# Patient Record
Sex: MALE | Race: WHITE | Employment: UNEMPLOYED | URBAN - METROPOLITAN AREA
[De-identification: names, ages, dates, MRNs, and addresses within clinical notes are randomized per-mention and may not be internally consistent; named-entity substitution may affect disease eponyms.]

---

## 2017-01-23 ENCOUNTER — HOSPITAL ENCOUNTER (EMERGENCY)
Age: 62
Discharge: HOME | End: 2017-01-23
Attending: EMERGENCY MEDICINE
Payer: SELF-PAY

## 2017-01-23 VITALS
TEMPERATURE: 98.3 F | HEIGHT: 71 IN | HEART RATE: 97 BPM | OXYGEN SATURATION: 92 % | BODY MASS INDEX: 25.2 KG/M2 | SYSTOLIC BLOOD PRESSURE: 139 MMHG | RESPIRATION RATE: 17 BRPM | DIASTOLIC BLOOD PRESSURE: 85 MMHG | WEIGHT: 180 LBS

## 2017-01-23 DIAGNOSIS — F10.29 ALCOHOL DEPENDENCE WITH UNSPECIFIED ALCOHOL-INDUCED DISORDER (HCC): Primary | ICD-10-CM

## 2017-01-23 DIAGNOSIS — R52 BODY ACHES: ICD-10-CM

## 2017-01-23 LAB
ALBUMIN SERPL BCP-MCNC: 3.7 G/DL (ref 3.5–5)
ALBUMIN/GLOB SERPL: 1.1 {RATIO} (ref 1.1–2.2)
ALP SERPL-CCNC: 94 U/L (ref 45–117)
ALT SERPL-CCNC: 54 U/L (ref 12–78)
ANION GAP BLD CALC-SCNC: 13 MMOL/L (ref 5–15)
APPEARANCE UR: CLEAR
AST SERPL W P-5'-P-CCNC: 71 U/L (ref 15–37)
BACTERIA URNS QL MICRO: NEGATIVE /HPF
BASOPHILS # BLD AUTO: 0 K/UL (ref 0–0.1)
BASOPHILS # BLD: 0 % (ref 0–1)
BILIRUB SERPL-MCNC: 0.4 MG/DL (ref 0.2–1)
BILIRUB UR QL: NEGATIVE
BUN SERPL-MCNC: 6 MG/DL (ref 6–20)
BUN/CREAT SERPL: 7 (ref 12–20)
CALCIUM SERPL-MCNC: 8.2 MG/DL (ref 8.5–10.1)
CHLORIDE SERPL-SCNC: 104 MMOL/L (ref 97–108)
CO2 SERPL-SCNC: 22 MMOL/L (ref 21–32)
COLOR UR: ABNORMAL
CREAT SERPL-MCNC: 0.86 MG/DL (ref 0.7–1.3)
DIFFERENTIAL METHOD BLD: ABNORMAL
EOSINOPHIL # BLD: 0 K/UL (ref 0–0.4)
EOSINOPHIL NFR BLD: 0 % (ref 0–7)
EPITH CASTS URNS QL MICRO: ABNORMAL /LPF
ERYTHROCYTE [DISTWIDTH] IN BLOOD BY AUTOMATED COUNT: 13.8 % (ref 11.5–14.5)
GLOBULIN SER CALC-MCNC: 3.5 G/DL (ref 2–4)
GLUCOSE SERPL-MCNC: 113 MG/DL (ref 65–100)
GLUCOSE UR STRIP.AUTO-MCNC: 100 MG/DL
HCT VFR BLD AUTO: 42.2 % (ref 36.6–50.3)
HGB BLD-MCNC: 15.2 G/DL (ref 12.1–17)
HGB UR QL STRIP: NEGATIVE
HYALINE CASTS URNS QL MICRO: ABNORMAL /LPF (ref 0–5)
INR PPP: 1 (ref 0.9–1.1)
KETONES UR QL STRIP.AUTO: NEGATIVE MG/DL
LEUKOCYTE ESTERASE UR QL STRIP.AUTO: NEGATIVE
LIPASE SERPL-CCNC: 98 U/L (ref 73–393)
LYMPHOCYTES # BLD AUTO: 7 % (ref 12–49)
LYMPHOCYTES # BLD: 0.7 K/UL (ref 0.8–3.5)
MAGNESIUM SERPL-MCNC: 1.9 MG/DL (ref 1.6–2.4)
MCH RBC QN AUTO: 34 PG (ref 26–34)
MCHC RBC AUTO-ENTMCNC: 36 G/DL (ref 30–36.5)
MCV RBC AUTO: 94.4 FL (ref 80–99)
MONOCYTES # BLD: 0.7 K/UL (ref 0–1)
MONOCYTES NFR BLD AUTO: 7 % (ref 5–13)
NEUTS SEG # BLD: 7.9 K/UL (ref 1.8–8)
NEUTS SEG NFR BLD AUTO: 86 % (ref 32–75)
NITRITE UR QL STRIP.AUTO: NEGATIVE
PH UR STRIP: 5.5 [PH] (ref 5–8)
PLATELET # BLD AUTO: 132 K/UL (ref 150–400)
POTASSIUM SERPL-SCNC: 3.6 MMOL/L (ref 3.5–5.1)
PROT SERPL-MCNC: 7.2 G/DL (ref 6.4–8.2)
PROT UR STRIP-MCNC: NEGATIVE MG/DL
PROTHROMBIN TIME: 10.5 SEC (ref 9–11.1)
RBC # BLD AUTO: 4.47 M/UL (ref 4.1–5.7)
RBC #/AREA URNS HPF: ABNORMAL /HPF (ref 0–5)
RBC MORPH BLD: ABNORMAL
SODIUM SERPL-SCNC: 139 MMOL/L (ref 136–145)
SP GR UR REFRACTOMETRY: 1.01 (ref 1–1.03)
UA: UC IF INDICATED,UAUC: ABNORMAL
UROBILINOGEN UR QL STRIP.AUTO: 0.2 EU/DL (ref 0.2–1)
WBC # BLD AUTO: 9.3 K/UL (ref 4.1–11.1)
WBC URNS QL MICRO: ABNORMAL /HPF (ref 0–4)

## 2017-01-23 PROCEDURE — 96376 TX/PRO/DX INJ SAME DRUG ADON: CPT

## 2017-01-23 PROCEDURE — 85025 COMPLETE CBC W/AUTO DIFF WBC: CPT | Performed by: EMERGENCY MEDICINE

## 2017-01-23 PROCEDURE — 36415 COLL VENOUS BLD VENIPUNCTURE: CPT | Performed by: EMERGENCY MEDICINE

## 2017-01-23 PROCEDURE — 74011000258 HC RX REV CODE- 258: Performed by: EMERGENCY MEDICINE

## 2017-01-23 PROCEDURE — 83690 ASSAY OF LIPASE: CPT | Performed by: EMERGENCY MEDICINE

## 2017-01-23 PROCEDURE — 96375 TX/PRO/DX INJ NEW DRUG ADDON: CPT

## 2017-01-23 PROCEDURE — 96365 THER/PROPH/DIAG IV INF INIT: CPT

## 2017-01-23 PROCEDURE — 74011250637 HC RX REV CODE- 250/637: Performed by: EMERGENCY MEDICINE

## 2017-01-23 PROCEDURE — 96361 HYDRATE IV INFUSION ADD-ON: CPT

## 2017-01-23 PROCEDURE — 81001 URINALYSIS AUTO W/SCOPE: CPT | Performed by: EMERGENCY MEDICINE

## 2017-01-23 PROCEDURE — 83735 ASSAY OF MAGNESIUM: CPT | Performed by: EMERGENCY MEDICINE

## 2017-01-23 PROCEDURE — 85610 PROTHROMBIN TIME: CPT | Performed by: EMERGENCY MEDICINE

## 2017-01-23 PROCEDURE — 80053 COMPREHEN METABOLIC PANEL: CPT | Performed by: EMERGENCY MEDICINE

## 2017-01-23 PROCEDURE — 74011250636 HC RX REV CODE- 250/636: Performed by: EMERGENCY MEDICINE

## 2017-01-23 PROCEDURE — 99285 EMERGENCY DEPT VISIT HI MDM: CPT

## 2017-01-23 RX ORDER — LORAZEPAM 2 MG/ML
1 INJECTION INTRAMUSCULAR ONCE
Status: COMPLETED | OUTPATIENT
Start: 2017-01-23 | End: 2017-01-23

## 2017-01-23 RX ORDER — ONDANSETRON 2 MG/ML
4 INJECTION INTRAMUSCULAR; INTRAVENOUS
Status: COMPLETED | OUTPATIENT
Start: 2017-01-23 | End: 2017-01-23

## 2017-01-23 RX ORDER — LORAZEPAM 2 MG/ML
1 INJECTION INTRAMUSCULAR
Status: COMPLETED | OUTPATIENT
Start: 2017-01-23 | End: 2017-01-23

## 2017-01-23 RX ORDER — CHLORDIAZEPOXIDE HYDROCHLORIDE 25 MG/1
25 CAPSULE, GELATIN COATED ORAL CONTINUOUS
Qty: 15 CAP | Refills: 0 | Status: SHIPPED | OUTPATIENT
Start: 2017-01-23 | End: 2018-11-26

## 2017-01-23 RX ORDER — THIAMINE HYDROCHLORIDE 100 MG/ML
100 INJECTION, SOLUTION INTRAMUSCULAR; INTRAVENOUS
Status: DISCONTINUED | OUTPATIENT
Start: 2017-01-23 | End: 2017-01-23 | Stop reason: SDUPTHER

## 2017-01-23 RX ORDER — OXYCODONE HYDROCHLORIDE 5 MG/1
5 TABLET ORAL
Status: COMPLETED | OUTPATIENT
Start: 2017-01-23 | End: 2017-01-23

## 2017-01-23 RX ADMIN — OXYCODONE HYDROCHLORIDE 5 MG: 5 TABLET ORAL at 16:56

## 2017-01-23 RX ADMIN — THIAMINE HYDROCHLORIDE 100 MG: 100 INJECTION, SOLUTION INTRAMUSCULAR; INTRAVENOUS at 18:23

## 2017-01-23 RX ADMIN — SODIUM CHLORIDE 1000 ML: 900 INJECTION, SOLUTION INTRAVENOUS at 16:56

## 2017-01-23 RX ADMIN — LORAZEPAM 1 MG: 2 INJECTION INTRAMUSCULAR; INTRAVENOUS at 20:14

## 2017-01-23 RX ADMIN — ONDANSETRON 4 MG: 2 INJECTION INTRAMUSCULAR; INTRAVENOUS at 16:56

## 2017-01-23 RX ADMIN — LORAZEPAM 1 MG: 2 INJECTION INTRAMUSCULAR; INTRAVENOUS at 18:21

## 2017-01-23 NOTE — ED PROVIDER NOTES
HPI Comments: Genevieve Roman is a 59 y.o. male who presents via EMS to the ED c/o intermittent, generalized abdominal cramps for the past 2 months. Pain does not radiate and comes and goes usually over several minutes. He denies any relieving or exacerbating factors. He also notes pain in bilateral shoulders. Per pt, he had 1 bottle of wine today, with his last drink at ~0900 this morning. He reports he does not eat frequently, but drinks at least 2 bottles of wine daily. He has never sought medical advice for this before. He denies any hx of previous endoscopy/colonscopy. He specifically denies any vomiting, hematochezia, melena, fevers, chills or chest pain. PCP: None    Social hx: +etoh abuse (2 bottles of wine per day for the past 40+ years)    There are no other complaints, changes, or physical findings at this time. The history is provided by the patient. History reviewed. No pertinent past medical history. History reviewed. No pertinent past surgical history. History reviewed. No pertinent family history. Social History     Social History    Marital status: SINGLE     Spouse name: N/A    Number of children: N/A    Years of education: N/A     Occupational History    Not on file. Social History Main Topics    Smoking status: Current Every Day Smoker     Packs/day: 2.00    Smokeless tobacco: Not on file    Alcohol use Yes      Comment: 2 bottles of wine x 40 years    Drug use: No    Sexual activity: Not on file     Other Topics Concern    Not on file     Social History Narrative         ALLERGIES: Review of patient's allergies indicates no known allergies. Review of Systems   Constitutional: Negative for chills, diaphoresis, fever and unexpected weight change. HENT: Negative for rhinorrhea and sore throat. Eyes: Negative for pain. Respiratory: Negative for shortness of breath, wheezing and stridor. Cardiovascular: Negative for chest pain and leg swelling. Gastrointestinal: Positive for abdominal pain (generalized). Negative for blood in stool, diarrhea, nausea and vomiting. Genitourinary: Negative for difficulty urinating, dysuria and flank pain. Musculoskeletal: Positive for arthralgias (bilateral shoulder pain). Negative for back pain and neck stiffness. Skin: Negative for rash. Neurological: Negative for seizures, syncope, weakness, light-headedness and headaches. Psychiatric/Behavioral: Negative for confusion. Patient Vitals for the past 12 hrs:   Temp Pulse Resp BP SpO2   01/23/17 1900 - (!) 101 18 121/87 92 %   01/23/17 1845 - 100 17 135/89 93 %   01/23/17 1830 - (!) 110 24 (!) 131/96 94 %   01/23/17 1815 - 97 19 128/84 93 %   01/23/17 1745 - 97 19 129/89 92 %   01/23/17 1715 - 99 22 110/90 93 %   01/23/17 1615 98.3 °F (36.8 °C) (!) 114 18 (!) 132/96 93 %     Physical Exam   Constitutional: He is oriented to person, place, and time. He appears well-developed and well-nourished. No distress. Speech is mildly slurred   HENT:   Nose: Nose normal.   Mouth/Throat: Oropharynx is clear and moist. No oropharyngeal exudate. Edentulous   Eyes: Conjunctivae and EOM are normal. Pupils are equal, round, and reactive to light. Right eye exhibits no discharge. Left eye exhibits no discharge. No scleral icterus. Neck: Normal range of motion. Neck supple. No JVD present. Cardiovascular: Normal rate, regular rhythm, normal heart sounds and intact distal pulses. No murmur heard. Pulmonary/Chest: Effort normal and breath sounds normal. No stridor. No respiratory distress. He has no wheezes. He has no rales. Abdominal: Soft. Bowel sounds are normal. He exhibits no distension. There is no rebound and no guarding. Diffuse abdominal tenderness   Musculoskeletal: He exhibits no edema or tenderness. Neurological: He is alert and oriented to person, place, and time. Skin: Skin is warm and dry. He is not diaphoretic.    Psychiatric: He has a normal mood and affect. Nursing note and vitals reviewed. MDM  Number of Diagnoses or Management Options  Alcohol dependence with unspecified alcohol-induced disorder ([facility] [address]): Body aches:   Diagnosis management comments: Alcohol dependence with withdrawal sx's. Hemodynamically stable. Pt also c/o spasming pain throughout his torso. Labs unremarkable, exam benign. No indication for inpatient detox. Pt treated with thiamine and benzos. Given Librium taper for detox. Resources given for withdrawal sx's. Stable for discharge. Amount and/or Complexity of Data Reviewed  Clinical lab tests: ordered and reviewed  Review and summarize past medical records: yes    Patient Progress  Patient progress: stable    ED Course       Procedures     Progress Note:  Pt having some tremors, likely due to etoh withdrawal. Will tx with ativan. LABORATORY TESTS:  Recent Results (from the past 12 hour(s))   CBC WITH AUTOMATED DIFF    Collection Time: 01/23/17  4:45 PM   Result Value Ref Range    WBC 9.3 4.1 - 11.1 K/uL    RBC 4.47 4.10 - 5.70 M/uL    HGB 15.2 12.1 - 17.0 g/dL    HCT 42.2 36.6 - 50.3 %    MCV 94.4 80.0 - 99.0 FL    MCH 34.0 26.0 - 34.0 PG    MCHC 36.0 30.0 - 36.5 g/dL    RDW 13.8 11.5 - 14.5 %    PLATELET 959 (L) 041 - 400 K/uL    NEUTROPHILS 86 (H) 32 - 75 %    LYMPHOCYTES 7 (L) 12 - 49 %    MONOCYTES 7 5 - 13 %    EOSINOPHILS 0 0 - 7 %    BASOPHILS 0 0 - 1 %    ABS. NEUTROPHILS 7.9 1.8 - 8.0 K/UL    ABS. LYMPHOCYTES 0.7 (L) 0.8 - 3.5 K/UL    ABS. MONOCYTES 0.7 0.0 - 1.0 K/UL    ABS. EOSINOPHILS 0.0 0.0 - 0.4 K/UL    ABS.  BASOPHILS 0.0 0.0 - 0.1 K/UL    RBC COMMENTS NORMOCYTIC, NORMOCHROMIC      DF SMEAR SCANNED     METABOLIC PANEL, COMPREHENSIVE    Collection Time: 01/23/17  4:45 PM   Result Value Ref Range    Sodium 139 136 - 145 mmol/L    Potassium 3.6 3.5 - 5.1 mmol/L    Chloride 104 97 - 108 mmol/L    CO2 22 21 - 32 mmol/L    Anion gap 13 5 - 15 mmol/L    Glucose 113 (H) 65 - 100 mg/dL    BUN 6 6 - 20 MG/DL Creatinine 0.86 0.70 - 1.30 MG/DL    BUN/Creatinine ratio 7 (L) 12 - 20      GFR est AA >60 >60 ml/min/1.73m2    GFR est non-AA >60 >60 ml/min/1.73m2    Calcium 8.2 (L) 8.5 - 10.1 MG/DL    Bilirubin, total 0.4 0.2 - 1.0 MG/DL    ALT 54 12 - 78 U/L    AST 71 (H) 15 - 37 U/L    Alk.  phosphatase 94 45 - 117 U/L    Protein, total 7.2 6.4 - 8.2 g/dL    Albumin 3.7 3.5 - 5.0 g/dL    Globulin 3.5 2.0 - 4.0 g/dL    A-G Ratio 1.1 1.1 - 2.2     LIPASE    Collection Time: 01/23/17  4:45 PM   Result Value Ref Range    Lipase 98 73 - 393 U/L   MAGNESIUM    Collection Time: 01/23/17  4:45 PM   Result Value Ref Range    Magnesium 1.9 1.6 - 2.4 mg/dL   PROTHROMBIN TIME + INR    Collection Time: 01/23/17  4:45 PM   Result Value Ref Range    INR 1.0 0.9 - 1.1      Prothrombin time 10.5 9.0 - 11.1 sec   URINALYSIS W/ REFLEX CULTURE    Collection Time: 01/23/17  6:00 PM   Result Value Ref Range    Color YELLOW/STRAW      Appearance CLEAR CLEAR      Specific gravity 1.009 1.003 - 1.030      pH (UA) 5.5 5.0 - 8.0      Protein NEGATIVE  NEG mg/dL    Glucose 100 (A) NEG mg/dL    Ketone NEGATIVE  NEG mg/dL    Bilirubin NEGATIVE  NEG      Blood NEGATIVE  NEG      Urobilinogen 0.2 0.2 - 1.0 EU/dL    Nitrites NEGATIVE  NEG      Leukocyte Esterase NEGATIVE  NEG      WBC 0-4 0 - 4 /hpf    RBC 0-5 0 - 5 /hpf    Epithelial cells FEW FEW /lpf    Bacteria NEGATIVE  NEG /hpf    UA:UC IF INDICATED CULTURE NOT INDICATED BY UA RESULT CNI      Hyaline Cast 2-5 0 - 5 /lpf       IMAGING RESULTS:  No orders to display     MEDICATIONS GIVEN:  Medications   sodium chloride 0.9 % bolus infusion 1,000 mL (0 mL IntraVENous IV Completed 1/23/17 1819)   ondansetron (ZOFRAN) injection 4 mg (4 mg IntraVENous Given 1/23/17 5206)   oxyCODONE IR (ROXICODONE) tablet 5 mg (5 mg Oral Given 1/23/17 1656)   thiamine (B-1) 100 mg in 0.9% sodium chloride 50 mL IVPB (0 mg IntraVENous IV Completed 1/23/17 1840)   LORazepam (ATIVAN) injection 1 mg (1 mg IntraVENous Given 1/23/17 1821)     IMPRESSION:  1. Alcohol dependence with unspecified alcohol-induced disorder ([facility] [address]    2. Body aches        PLAN:  1. Follow-up Information     Follow up With Details Comments Contact Info    Primary Care Doctor Call in 1 day          2. Return to ED if worse     DISCHARGE NOTE  7:18 PM  The patient has been re-evaluated and is ready for discharge. Reviewed available results with patient. Counseled patient on diagnosis and care plan. Patient has expressed understanding, and all questions have been answered. Patient agrees with plan and agrees to follow up as recommended, or return to the ED if their symptoms worsen. Discharge instructions have been provided and explained to the patient, along with reasons to return to the ED. This note is prepared by Betty Siu, acting as Scribe for Anayeli Muhammad MD.    Anayeli Muhammad MD: The the scribe's documentation has been prepared under my direction and personally reviewed by me in its entirety. I confirm that the note above accurately reflects all work, treatment, procedures, and medical decision making performed by me.

## 2017-01-23 NOTE — ED NOTES
Post Fall Documentation      Genora Risk witnessed/unwitnessed fall occurred on 1-23-17 (Date) at [address] [facility] (Time). The answers to the following questions summarize the fall:     · In the patient's own words,:  · What was he/she doing when he/she fell? The pt was attempting to void via urinal \"those damn socks made me do it\"    · What are his/her complaints? No complaints    · Nurse:  · Document observation, treatment, conversation, follow-up, and patient response. Tip [facility]) was accompanying pt to void via urinal when his right foot slipped and he landed on his knees pt yelled \"O fuck\" and I came in room to assist pt. Pt now sitting in bed with no complaints. Pt was provied with gripper socks and assisted to void with success. · What was the patient's condition when found (i.e., pain, symptoms, cuts, bruises)? REsting on floor in kneeled position. · What specific complaints did the patient have? none    · What did the staff do when patient was found (i.e., vital signs, returned to bed with fall alarm, side rails up)? Helped pt back to bed. Provided gripper socks and assisted with voiding via urinal    · Which physician was notified?  Dr. Bud Arzola, RN

## 2017-01-23 NOTE — ED NOTES
Assumed care of pt from EMS stretcher. Pt comes for c/o pain over entire abdomen. Pt reports he drinks 2 bottles of wine daily x 40 years. Pt reports he had one bottle of wine this morning. Pt denies n/v/d. Pt alert and oriented to person, place, and time. Pt placed on cardiac monitor x3. VSS. Pt in position of comfort with call bell within reach. Seizure precautions in place and side rails up x2 and padded. Pt instructed not to get out of bed without assistance. Pt verbalized understanding. Pt in no signs of acute distress at this time.

## 2017-01-24 NOTE — DISCHARGE INSTRUCTIONS
Alcohol Detoxification and Withdrawal: Care Instructions  Your Care Instructions  If you drink alcohol regularly and then suddenly stop, you may go through some physical and emotional problems while the alcohol clears out of your system. Clearing the alcohol from your body is called detoxification, or detox. Physical and emotional problems that may happen during detox are called withdrawal.  Symptoms of withdrawal can be scary and dangerous. Mild symptoms include nausea and vomiting, sweating, shakiness, and intense worry. Severe symptoms include being confused and irritable, feeling things on your body that are not there, seeing or hearing things that are not there, and trembling. You may even have seizures. If your symptoms become severe you must see a doctor. People who drink large amounts of alcohol should not try to detox at home. A person can die of severe alcohol withdrawal.  Symptoms of alcohol withdrawal may begin from 4 to 12 hours after you stop drinking. But they may not start for several days after the last drink. They can last a few days. It is hard to stop drinking. But when you have cleared the alcohol from your system, you will be able to start the next part of your life, free from the burden of being addicted. Follow-up care is a key part of your treatment and safety. Be sure to make and go to all appointments, and call your doctor if you are having problems. It's also a good idea to know your test results and keep a list of the medicines you take. How can you care for yourself at home? · Before you stop drinking, talk to your doctor about how you plan to stop. Be sure to be completely honest with him or her about how much you have been drinking. Your doctor will figure out whether you need to detox in a supervised medical center. · Take your medicines exactly as prescribed. Call your doctor if you think you are having a problem with your medicine.   · Make sure someone you trust is with you the whole time. Have friends and family members take turns staying with you until you are done with detox. · Put a list of emergency numbers near the phone. This should include your doctor, the police, the nearest hospital and emergency room, and neighbors who can help if needed. · Make sure all alcohol is removed from the house before you start. This includes beverages as well as medicines, rubbing alcohol, and certain flavorings like vanilla extract. · Keep \"drinking buddies\" away during the time you are going through detox. · Make your surroundings calm. Soft lights, soft music, and a comfortable place to sit or lie down can help make the process easier. · Drink lots of fluids and eat snacks such as fruit, cheese and crackers, and pretzels. Foods high in carbohydrate may help reduce the craving for alcohol. · Understand that detox is going to be hard. · Keep in mind that the people watching over you during detox are there to help. Explain to them before you start that you may not act like yourself until detox is finished. · Consider joining a support group such as Alcoholics Anonymous. Sharing your experiences with other people who face similar challenges may help you feel less overwhelmed. · Keep the numbers for these national suicide hotlines: 3-710-350-TALK (8-460.744.5512) and 4-651-XNODKFA (0-333.650.5838). If you or someone you know talks about suicide or feeling hopeless, get help right away. When should you call for help? Call 911 anytime you think you may need emergency care. For example, call if:  · You feel you cannot stop from hurting yourself or someone else. · You vomit many times and cannot stop. · You vomit blood or what looks like coffee grounds. · You have trouble breathing or are breathing very fast.  · Your heart beats more than 120 times a minute and will not slow down. · You have chest pain. · You have a seizure.   · You see or feel things that are not there (hallucinate). If you are caring for someone who is going through detox, call if:  · The person passes out (loses consciousness). · The person sees or feels things that are not there and sees or hears the same things many times. · The person is very agitated and will not calm down. · The person becomes violent or threatens to be violent. · The person has a seizure. Call your doctor now or seek immediate medical care if:  · You have a high fever. · You have severe belly pain. · You are very shaky. Watch closely for changes in your health, and be sure to contact your doctor if:  · You do not get better as expected. Where can you learn more? Go to http://sydni-nato.info/. Enter [phone] in the search box to learn more about \"Alcohol Detoxification and Withdrawal: Care Instructions. \"  Current as of: April 25, 2016  Content Version: 11.1  © 2828-6303 [employer]. Care instructions adapted under license by [employer] (which disclaims liability or warranty for this information). If you have questions about a medical condition or this instruction, always ask your healthcare professional. [address] any warranty or liability for your use of this information.

## 2017-01-24 NOTE — ED NOTES
Bedside and Verbal shift change report given to Mae Villegas RN (oncoming nurse) by Wilton Andres RN (offgoing nurse). Report included the following information SBAR, ED Summary, MAR and Recent Results.

## 2017-01-24 NOTE — ED NOTES
Dr. Clara Willis reviewed discharge instructions with the patient. The patient verbalized understanding. All questions and concerns were addressed. The patient is discharged ambulatory in the care of family members with instructions and prescriptions in hand. Pt is alert and oriented x 4. Respirations are clear and unlabored.

## 2017-01-30 NOTE — ED NOTES
Chart accessed for fall champion review information.    Not a member of patient's medical treatment team.     Marcial Manzano RN [facility]

## 2018-01-19 ENCOUNTER — APPOINTMENT (OUTPATIENT)
Dept: GENERAL RADIOLOGY | Age: 63
End: 2018-01-19
Attending: EMERGENCY MEDICINE
Payer: SELF-PAY

## 2018-01-19 ENCOUNTER — HOSPITAL ENCOUNTER (EMERGENCY)
Age: 63
Discharge: HOME | End: 2018-01-20
Attending: EMERGENCY MEDICINE
Payer: SELF-PAY

## 2018-01-19 DIAGNOSIS — F10.10 ALCOHOL ABUSE: ICD-10-CM

## 2018-01-19 DIAGNOSIS — F17.210 SMOKING GREATER THAN 40 PACK YEARS: ICD-10-CM

## 2018-01-19 DIAGNOSIS — J20.9 ACUTE BRONCHITIS, UNSPECIFIED ORGANISM: ICD-10-CM

## 2018-01-19 DIAGNOSIS — J44.1 ACUTE EXACERBATION OF CHRONIC OBSTRUCTIVE PULMONARY DISEASE (COPD) (HCC): ICD-10-CM

## 2018-01-19 DIAGNOSIS — R06.02 SHORTNESS OF BREATH: Primary | ICD-10-CM

## 2018-01-19 LAB
ALBUMIN SERPL-MCNC: 3.7 G/DL (ref 3.5–5)
ALBUMIN/GLOB SERPL: 1.1 {RATIO} (ref 1.1–2.2)
ALP SERPL-CCNC: 61 U/L (ref 45–117)
ALT SERPL-CCNC: 19 U/L (ref 12–78)
ANION GAP SERPL CALC-SCNC: 8 MMOL/L (ref 5–15)
AST SERPL-CCNC: 18 U/L (ref 15–37)
BASOPHILS # BLD: 0 K/UL (ref 0–0.1)
BASOPHILS NFR BLD: 0 % (ref 0–1)
BILIRUB SERPL-MCNC: 0.5 MG/DL (ref 0.2–1)
BNP SERPL-MCNC: 166 PG/ML (ref 0–125)
BUN SERPL-MCNC: 8 MG/DL (ref 6–20)
BUN/CREAT SERPL: 9 (ref 12–20)
CALCIUM SERPL-MCNC: 9 MG/DL (ref 8.5–10.1)
CHLORIDE SERPL-SCNC: 97 MMOL/L (ref 97–108)
CO2 SERPL-SCNC: 27 MMOL/L (ref 21–32)
CREAT SERPL-MCNC: 0.88 MG/DL (ref 0.7–1.3)
EOSINOPHIL # BLD: 0 K/UL (ref 0–0.4)
EOSINOPHIL NFR BLD: 1 % (ref 0–7)
ERYTHROCYTE [DISTWIDTH] IN BLOOD BY AUTOMATED COUNT: 13.5 % (ref 11.5–14.5)
GLOBULIN SER CALC-MCNC: 3.4 G/DL (ref 2–4)
GLUCOSE SERPL-MCNC: 82 MG/DL (ref 65–100)
HCT VFR BLD AUTO: 40.8 % (ref 36.6–50.3)
HGB BLD-MCNC: 14.8 G/DL (ref 12.1–17)
INR PPP: 1 (ref 0.9–1.1)
LYMPHOCYTES # BLD: 1.9 K/UL (ref 0.8–3.5)
LYMPHOCYTES NFR BLD: 24 % (ref 12–49)
MAGNESIUM SERPL-MCNC: 2.1 MG/DL (ref 1.6–2.4)
MCH RBC QN AUTO: 32.8 PG (ref 26–34)
MCHC RBC AUTO-ENTMCNC: 36.3 G/DL (ref 30–36.5)
MCV RBC AUTO: 90.5 FL (ref 80–99)
MONOCYTES # BLD: 0.6 K/UL (ref 0–1)
MONOCYTES NFR BLD: 7 % (ref 5–13)
NEUTS SEG # BLD: 5.4 K/UL (ref 1.8–8)
NEUTS SEG NFR BLD: 68 % (ref 32–75)
PLATELET # BLD AUTO: 230 K/UL (ref 150–400)
POTASSIUM SERPL-SCNC: 3.7 MMOL/L (ref 3.5–5.1)
PROT SERPL-MCNC: 7.1 G/DL (ref 6.4–8.2)
PROTHROMBIN TIME: 10.3 SEC (ref 9–11.1)
RBC # BLD AUTO: 4.51 M/UL (ref 4.1–5.7)
SODIUM SERPL-SCNC: 132 MMOL/L (ref 136–145)
TROPONIN I SERPL-MCNC: <0.04 NG/ML
WBC # BLD AUTO: 7.9 K/UL (ref 4.1–11.1)

## 2018-01-19 PROCEDURE — 74011000250 HC RX REV CODE- 250: Performed by: EMERGENCY MEDICINE

## 2018-01-19 PROCEDURE — 96374 THER/PROPH/DIAG INJ IV PUSH: CPT

## 2018-01-19 PROCEDURE — 74011250637 HC RX REV CODE- 250/637: Performed by: EMERGENCY MEDICINE

## 2018-01-19 PROCEDURE — 83735 ASSAY OF MAGNESIUM: CPT | Performed by: EMERGENCY MEDICINE

## 2018-01-19 PROCEDURE — 74011636637 HC RX REV CODE- 636/637: Performed by: EMERGENCY MEDICINE

## 2018-01-19 PROCEDURE — 71045 X-RAY EXAM CHEST 1 VIEW: CPT

## 2018-01-19 PROCEDURE — 94640 AIRWAY INHALATION TREATMENT: CPT

## 2018-01-19 PROCEDURE — 99285 EMERGENCY DEPT VISIT HI MDM: CPT

## 2018-01-19 PROCEDURE — 77030029684 HC NEB SM VOL KT MONA -A

## 2018-01-19 PROCEDURE — 85025 COMPLETE CBC W/AUTO DIFF WBC: CPT | Performed by: EMERGENCY MEDICINE

## 2018-01-19 PROCEDURE — 93005 ELECTROCARDIOGRAM TRACING: CPT

## 2018-01-19 PROCEDURE — 85610 PROTHROMBIN TIME: CPT | Performed by: EMERGENCY MEDICINE

## 2018-01-19 PROCEDURE — 80053 COMPREHEN METABOLIC PANEL: CPT | Performed by: EMERGENCY MEDICINE

## 2018-01-19 PROCEDURE — 83880 ASSAY OF NATRIURETIC PEPTIDE: CPT | Performed by: EMERGENCY MEDICINE

## 2018-01-19 PROCEDURE — 36415 COLL VENOUS BLD VENIPUNCTURE: CPT | Performed by: EMERGENCY MEDICINE

## 2018-01-19 PROCEDURE — 84484 ASSAY OF TROPONIN QUANT: CPT | Performed by: EMERGENCY MEDICINE

## 2018-01-19 PROCEDURE — 74011250636 HC RX REV CODE- 250/636: Performed by: EMERGENCY MEDICINE

## 2018-01-19 RX ORDER — IPRATROPIUM BROMIDE AND ALBUTEROL SULFATE 2.5; .5 MG/3ML; MG/3ML
3 SOLUTION RESPIRATORY (INHALATION)
Status: COMPLETED | OUTPATIENT
Start: 2018-01-19 | End: 2018-01-19

## 2018-01-19 RX ORDER — AZITHROMYCIN 250 MG/1
TABLET, FILM COATED ORAL
Qty: 6 TAB | Refills: 0 | Status: SHIPPED | OUTPATIENT
Start: 2018-01-19 | End: 2018-01-24

## 2018-01-19 RX ORDER — ASPIRIN 325 MG
325 TABLET ORAL
Status: COMPLETED | OUTPATIENT
Start: 2018-01-19 | End: 2018-01-19

## 2018-01-19 RX ORDER — SODIUM CHLORIDE 0.9 % (FLUSH) 0.9 %
5-10 SYRINGE (ML) INJECTION EVERY 8 HOURS
Status: DISCONTINUED | OUTPATIENT
Start: 2018-01-19 | End: 2018-01-20 | Stop reason: HOSPADM

## 2018-01-19 RX ORDER — SODIUM CHLORIDE 0.9 % (FLUSH) 0.9 %
5-10 SYRINGE (ML) INJECTION AS NEEDED
Status: DISCONTINUED | OUTPATIENT
Start: 2018-01-19 | End: 2018-01-20 | Stop reason: HOSPADM

## 2018-01-19 RX ORDER — PREDNISONE 20 MG/1
60 TABLET ORAL ONCE
Status: COMPLETED | OUTPATIENT
Start: 2018-01-19 | End: 2018-01-19

## 2018-01-19 RX ORDER — ALBUTEROL SULFATE 90 UG/1
2 AEROSOL, METERED RESPIRATORY (INHALATION)
Qty: 1 INHALER | Refills: 0 | Status: ON HOLD | OUTPATIENT
Start: 2018-01-19 | End: 2018-12-01 | Stop reason: SDUPTHER

## 2018-01-19 RX ORDER — LORAZEPAM 2 MG/ML
1 INJECTION INTRAMUSCULAR
Status: COMPLETED | OUTPATIENT
Start: 2018-01-19 | End: 2018-01-19

## 2018-01-19 RX ORDER — ALBUTEROL SULFATE 1.25 MG/3ML
1.25 SOLUTION RESPIRATORY (INHALATION)
Qty: 25 EACH | Refills: 0 | Status: SHIPPED | OUTPATIENT
Start: 2018-01-19 | End: 2018-12-03

## 2018-01-19 RX ORDER — PREDNISONE 20 MG/1
60 TABLET ORAL DAILY
Qty: 15 TAB | Refills: 0 | Status: SHIPPED | OUTPATIENT
Start: 2018-01-19 | End: 2018-01-24

## 2018-01-19 RX ADMIN — ASPIRIN 325 MG ORAL TABLET 325 MG: 325 PILL ORAL at 22:22

## 2018-01-19 RX ADMIN — IPRATROPIUM BROMIDE AND ALBUTEROL SULFATE 3 ML: .5; 3 SOLUTION RESPIRATORY (INHALATION) at 23:01

## 2018-01-19 RX ADMIN — PREDNISONE 60 MG: 20 TABLET ORAL at 22:22

## 2018-01-19 RX ADMIN — LORAZEPAM 1 MG: 2 INJECTION INTRAMUSCULAR; INTRAVENOUS at 23:16

## 2018-01-19 RX ADMIN — IPRATROPIUM BROMIDE AND ALBUTEROL SULFATE 3 ML: .5; 3 SOLUTION RESPIRATORY (INHALATION) at 22:24

## 2018-01-19 RX ADMIN — Medication 10 ML: at 23:19

## 2018-01-20 VITALS
SYSTOLIC BLOOD PRESSURE: 114 MMHG | BODY MASS INDEX: 21.62 KG/M2 | WEIGHT: 159.61 LBS | DIASTOLIC BLOOD PRESSURE: 69 MMHG | OXYGEN SATURATION: 94 % | RESPIRATION RATE: 21 BRPM | HEIGHT: 72 IN | HEART RATE: 96 BPM | TEMPERATURE: 98.5 F

## 2018-01-20 LAB
ATRIAL RATE: 81 BPM
CALCULATED P AXIS, ECG09: 71 DEGREES
CALCULATED R AXIS, ECG10: 52 DEGREES
CALCULATED T AXIS, ECG11: 62 DEGREES
DIAGNOSIS, 93000: NORMAL
P-R INTERVAL, ECG05: 170 MS
Q-T INTERVAL, ECG07: 400 MS
QRS DURATION, ECG06: 90 MS
QTC CALCULATION (BEZET), ECG08: 464 MS
VENTRICULAR RATE, ECG03: 81 BPM

## 2018-01-20 RX ORDER — IPRATROPIUM BROMIDE AND ALBUTEROL SULFATE 2.5; .5 MG/3ML; MG/3ML
3 SOLUTION RESPIRATORY (INHALATION)
Qty: 30 NEBULE | Refills: 0 | Status: SHIPPED | OUTPATIENT
Start: 2018-01-20 | End: 2018-01-20

## 2018-01-20 RX ORDER — PREDNISONE 50 MG/1
50 TABLET ORAL DAILY
Qty: 5 TAB | Refills: 0 | Status: SHIPPED | OUTPATIENT
Start: 2018-01-20 | End: 2018-01-25

## 2018-01-20 RX ORDER — AZITHROMYCIN 250 MG/1
TABLET, FILM COATED ORAL
Qty: 6 TAB | Refills: 0 | Status: SHIPPED | OUTPATIENT
Start: 2018-01-20 | End: 2018-01-25

## 2018-01-20 NOTE — ED NOTES
Patient presents to ED with C/O SOB  that started this week. The pt stated he also experiences a sharp pain on the left side of his chest intermittently. The pt denies N/V/D, dizziness, urinary symptoms, fever, and chills. Patient is A&Ox3, side rails up, call bell w/in reach, and aware of plan of care. The patient has tremors.

## 2018-01-20 NOTE — ED NOTES
Patient discharged and given discharge instructions by Segundo Feliz MD. Patient had an opportunity to ask questions. Patient verbalized understanding of discharge instructions. Patient d/c from ED ambulatory from with a steady, discharge instructions and prescriptions in hand. Patient accompanied by self. Pt refused a wheelchair. Pt waiting in waiting room for friend. Discussed with Segundo Feliz MD pt's CIWA of 6. Segundo Feliz MD okay for pt to be d/c'd.

## 2018-01-20 NOTE — ED PROVIDER NOTES
EMERGENCY DEPARTMENT HISTORY AND PHYSICAL EXAM      Date: 1/19/2018  Patient Name: Deon Klein    History of Presenting Illness     Chief Complaint   Patient presents with    Shortness of Breath     Ambulatory off the EMS stretcher to the bathroom. COPD history complaining of SOB tonight. Has been drinking a lot and smoking this evening also. Sats 98 to 100% per EMS       History Provided By: Patient    HPI: Deon Klein, 57 y.o. male with no PMHx presents via EMS to the ED with cc of SOB x several days. EMS notes pt's SPO2 was 98% upon arrival. Pt also notes having an intermittent mild to moderate sharp L sided CP that is worse with deep inspiration. He states he's been coughing more than usual with mild change in sputum color. He reports using his nebulizer at home with some relief. Pt states he has been smoking a lot. He also states he drinks alcohol daily, last drink was several hours prior to ED visit. He denies any recent hospitalization. Pt denies any fevers, chills, leg swelling, abdominal pain, N/V/D, constipation, dysuria, hematuria, dizziness, lightheadedness, HA and rashes. Social hx: +Tobacco, +EtOH, -Drugs    PCP: Candace Day MD    There are no other complaints, changes, or physical findings at this time. Current Facility-Administered Medications   Medication Dose Route Frequency Provider Last Rate Last Dose    sodium chloride (NS) flush 5-10 mL  5-10 mL IntraVENous Q8H Malena Mabry MD   10 mL at 01/19/18 9860    sodium chloride (NS) flush 5-10 mL  5-10 mL IntraVENous PRN Malena Mabry MD         Current Outpatient Prescriptions   Medication Sig Dispense Refill    albuterol (PROVENTIL HFA, VENTOLIN HFA, PROAIR HFA) 90 mcg/actuation inhaler Take 2 Puffs by inhalation every four (4) hours as needed for Wheezing. 1 Inhaler 0    albuterol (ACCUNEB) 1.25 mg/3 mL nebu Take 3 mL by inhalation every four (4) hours as needed (wheezing).  25 Each 0    predniSONE (DELTASONE) 20 mg tablet Take 3 Tabs by mouth daily for 5 days. With Breakfast 15 Tab 0    azithromycin (ZITHROMAX) 250 mg tablet Take 500mg day 1, then 250mg daily x 4 days 6 Tab 0    chlordiazePOXIDE (LIBRIUM) 25 mg capsule Take 1 Cap by mouth continuous. Take 2 tabs po q6hrs on day 1  Take 1 tab po q6hrs on day 2  Take 1 tab po q12hrs on day 3  Take 1 tab po qhs on day 4 15 Cap 0       Past History     Past Medical History:  History reviewed. No pertinent past medical history. Past Surgical History:  History reviewed. No pertinent surgical history. Family History:  History reviewed. No pertinent family history. Social History:  Social History   Substance Use Topics    Smoking status: Current Every Day Smoker     Packs/day: 2.00    Smokeless tobacco: None    Alcohol use Yes      Comment: 2 bottles of wine x 40 years, 24 beers per day       Allergies:  No Known Allergies      Review of Systems   Review of Systems   Constitutional: Negative. Negative for chills and fever. HENT: Negative. Negative for congestion, facial swelling, rhinorrhea, sore throat, trouble swallowing and voice change. Eyes: Negative. Respiratory: Positive for shortness of breath. Negative for apnea, cough, chest tightness and wheezing. Cardiovascular: Positive for chest pain. Negative for palpitations and leg swelling. Gastrointestinal: Negative. Negative for abdominal distention, abdominal pain, blood in stool, constipation, diarrhea, nausea and vomiting. Endocrine: Negative. Negative for cold intolerance, heat intolerance and polyuria. Genitourinary: Negative. Negative for difficulty urinating, dysuria, flank pain, frequency, hematuria and urgency. Musculoskeletal: Negative. Negative for arthralgias, back pain, myalgias, neck pain and neck stiffness. Skin: Negative. Negative for color change and rash. Neurological: Negative.   Negative for dizziness, syncope, facial asymmetry, speech difficulty, weakness, light-headedness, numbness and headaches. Hematological: Negative. Does not bruise/bleed easily. Psychiatric/Behavioral: Negative. Negative for confusion and self-injury. The patient is not nervous/anxious. Physical Exam   Physical Exam   Constitutional: He is oriented to person, place, and time. Vital signs are normal. He appears well-developed and well-nourished. He is cooperative. Non-toxic appearance. No distress. HENT:   Head: Normocephalic and atraumatic. Mouth/Throat: Mucous membranes are normal. No posterior oropharyngeal erythema. Eyes: Conjunctivae and EOM are normal. Pupils are equal, round, and reactive to light. Neck: Normal range of motion. Cardiovascular: Normal rate, regular rhythm, normal heart sounds and intact distal pulses. Exam reveals no gallop and no friction rub. No murmur heard. Pulmonary/Chest: Effort normal. No respiratory distress. He exhibits no tenderness. Fine wheezing BL. No retractions. Speaking in full sentences. Abdominal: Soft. Bowel sounds are normal. He exhibits no distension and no mass. There is no tenderness. There is no rebound and no guarding. Musculoskeletal: Normal range of motion. He exhibits no edema, tenderness or deformity. No peripheral edema. Neurological: He is alert and oriented to person, place, and time. He displays normal reflexes. No cranial nerve deficit. He exhibits normal muscle tone. Coordination normal.   Skin: Skin is warm. No rash noted. Psychiatric: He has a normal mood and affect. Nursing note and vitals reviewed.         Diagnostic Study Results     Labs -     Recent Results (from the past 12 hour(s))   CBC WITH AUTOMATED DIFF    Collection Time: 01/19/18  9:04 PM   Result Value Ref Range    WBC 7.9 4.1 - 11.1 K/uL    RBC 4.51 4.10 - 5.70 M/uL    HGB 14.8 12.1 - 17.0 g/dL    HCT 40.8 36.6 - 50.3 %    MCV 90.5 80.0 - 99.0 FL    MCH 32.8 26.0 - 34.0 PG    MCHC 36.3 30.0 - 36.5 g/dL    RDW 13.5 11.5 - 14.5 %    PLATELET 968 661 - 556 K/uL    NEUTROPHILS 68 32 - 75 %    LYMPHOCYTES 24 12 - 49 %    MONOCYTES 7 5 - 13 %    EOSINOPHILS 1 0 - 7 %    BASOPHILS 0 0 - 1 %    ABS. NEUTROPHILS 5.4 1.8 - 8.0 K/UL    ABS. LYMPHOCYTES 1.9 0.8 - 3.5 K/UL    ABS. MONOCYTES 0.6 0.0 - 1.0 K/UL    ABS. EOSINOPHILS 0.0 0.0 - 0.4 K/UL    ABS. BASOPHILS 0.0 0.0 - 0.1 K/UL   METABOLIC PANEL, COMPREHENSIVE    Collection Time: 01/19/18  9:04 PM   Result Value Ref Range    Sodium 132 (L) 136 - 145 mmol/L    Potassium 3.7 3.5 - 5.1 mmol/L    Chloride 97 97 - 108 mmol/L    CO2 27 21 - 32 mmol/L    Anion gap 8 5 - 15 mmol/L    Glucose 82 65 - 100 mg/dL    BUN 8 6 - 20 MG/DL    Creatinine 0.88 0.70 - 1.30 MG/DL    BUN/Creatinine ratio 9 (L) 12 - 20      GFR est AA >60 >60 ml/min/1.73m2    GFR est non-AA >60 >60 ml/min/1.73m2    Calcium 9.0 8.5 - 10.1 MG/DL    Bilirubin, total 0.5 0.2 - 1.0 MG/DL    ALT (SGPT) 19 12 - 78 U/L    AST (SGOT) 18 15 - 37 U/L    Alk.  phosphatase 61 45 - 117 U/L    Protein, total 7.1 6.4 - 8.2 g/dL    Albumin 3.7 3.5 - 5.0 g/dL    Globulin 3.4 2.0 - 4.0 g/dL    A-G Ratio 1.1 1.1 - 2.2     MAGNESIUM    Collection Time: 01/19/18  9:04 PM   Result Value Ref Range    Magnesium 2.1 1.6 - 2.4 mg/dL   PROTHROMBIN TIME + INR    Collection Time: 01/19/18  9:04 PM   Result Value Ref Range    INR 1.0 0.9 - 1.1      Prothrombin time 10.3 9.0 - 11.1 sec   NT-PRO BNP    Collection Time: 01/19/18  9:04 PM   Result Value Ref Range    NT pro- (H) 0 - 125 PG/ML   TROPONIN I    Collection Time: 01/19/18  9:04 PM   Result Value Ref Range    Troponin-I, Qt. <0.04 <0.05 ng/mL   EKG, 12 LEAD, INITIAL    Collection Time: 01/19/18 10:11 PM   Result Value Ref Range    Ventricular Rate 81 BPM    Atrial Rate 81 BPM    P-R Interval 170 ms    QRS Duration 90 ms    Q-T Interval 400 ms    QTC Calculation (Bezet) 464 ms    Calculated P Axis 71 degrees    Calculated R Axis 52 degrees    Calculated T Axis 62 degrees    Diagnosis       Normal sinus rhythm  Normal ECG  When compared with ECG of 09-OCT-2008 13:42,  Previous ECG has undetermined rhythm, needs review         Radiologic Studies -   XR CHEST PORT   Final Result        CT Results  (Last 48 hours)    None        CXR Results  (Last 48 hours)               01/19/18 2143  XR CHEST PORT Final result    Impression:  IMPRESSION: Hyperinflated, emphysematous lungs. Narrative:  INDICATION: Chest pain. Shortness of breath this evening. Portable AP semiupright view of the chest.       Direct comparison made to prior chest x-ray dated December 2015. Cardiomediastinal silhouette is stable. Lungs are hyperinflated and there are   biapical emphysematous changes. Lungs are otherwise clear bilaterally. Pleural   spaces are normal. Osseous structures are diffusely demineralized. There are   healed right posterior rib fracture deformities. Medical Decision Making   I am the first provider for this patient. I reviewed the vital signs, available nursing notes, past medical history, past surgical history, family history and social history. Vital Signs-Reviewed the patient's vital signs.   Patient Vitals for the past 12 hrs:   Temp Pulse Resp BP SpO2   01/19/18 1957 98.5 °F (36.9 °C) 92 18 149/74 97 %       Cardiac Monitor:   Rate: HR 92, NSR on monitor    EKG interpretation: (Preliminary)  Ventricular Rate 81 BPM   Atrial Rate 81 BPM   P-R Interval 170 ms   QRS Duration 90 ms   Q-T Interval 400 ms   QTC Calculation (Bezet) 464 ms   Calculated P Axis 71 degrees   Calculated R Axis 52 degrees   Calculated T Axis 62 degrees   Diagnosis      Normal sinus rhythm  Normal ECG  When compared with ECG of 09-OCT-2008 13:42,  Previous ECG has undetermined rhythm, needs review           Records Reviewed: Nursing Notes, Old Medical Records, Previous electrocardiograms, Ambulance Run Sheet, Previous Radiology Studies and Previous Laboratory Studies    Provider Notes (Medical Decision Making):   DDx: ACS, COPD, bronchitis, exacerbation, chronic smoking    Pt is a 57 y.o. M with a hx of COPD, chronic smoking, presents with two days of SOB. Pt's vitals are stable. Reports chronic ETOH use. No signs of withdrawal at this point but will continue to monitor. Will check labs, check EKG, CXR, give duoneb and steroids and reassess. ED Course:   Initial assessment performed. The patients presenting problems have been discussed, and they are in agreement with the care plan formulated and outlined with them. I have encouraged them to ask questions as they arise throughout their visit. TOBACCO COUNSELING:  Upon evaluation, pt expressed that they are a current tobacco user. For approximately 10 minutes, pt has been counseled on the dangers of smoking and was encouraged to quit as soon as possible in order to decrease further risks to their health. Pt has conveyed their understanding of the risks involved should they continue to use tobacco products. 10:36 PM  I re-examined the patient and evaluated the effectiveness of breathing treatment they received. I feel that there has been some improvement, but also feel that the patient would benefit clinically from further breathing treatments. I will evaluate the patient again after the next round of treatments. 11:19 PM  Informed by Devon Goldberg RN that pt was having tremors secondary to withdrawal from EtOH. CIWA 11; Will give IV ativan and reassess. 1:42 AM  Reassessed pt. He repots feeling better after taking ativan, duo neb, prednisone and ASA. He denies any further withdrawal symptoms. Pt is safe for dc. Critical Care Time:   0    Disposition:  DISCHARGE NOTE  2:46 AM  The patient has been re-evaluated and is ready for discharge. Reviewed available results with patient. Counseled pt on diagnosis and care plan. Pt has expressed understanding, and all questions have been answered.  Pt agrees with plan and agrees to F/U as recommended, or return to the ED if their sxs worsen. Discharge instructions have been provided and explained to the pt, along with reasons to return to the ED. PLAN:  1. Current Discharge Medication List      START taking these medications    Details   albuterol (PROVENTIL HFA, VENTOLIN HFA, PROAIR HFA) 90 mcg/actuation inhaler Take 2 Puffs by inhalation every four (4) hours as needed for Wheezing. Qty: 1 Inhaler, Refills: 0      albuterol (ACCUNEB) 1.25 mg/3 mL nebu Take 3 mL by inhalation every four (4) hours as needed (wheezing). Qty: 25 Each, Refills: 0      predniSONE (DELTASONE) 20 mg tablet Take 3 Tabs by mouth daily for 5 days. With Breakfast  Qty: 15 Tab, Refills: 0      azithromycin (ZITHROMAX) 250 mg tablet Take 500mg day 1, then 250mg daily x 4 days  Qty: 6 Tab, Refills: 0         CONTINUE these medications which have NOT CHANGED    Details   chlordiazePOXIDE (LIBRIUM) 25 mg capsule Take 1 Cap by mouth continuous. Take 2 tabs po q6hrs on day 1  Take 1 tab po q6hrs on day 2  Take 1 tab po q12hrs on day 3  Take 1 tab po qhs on day 4  Qty: 15 Cap, Refills: 0           2. Follow-up Information     Follow up With Details Comments [address], [place]   [place] [address]  [phone]      [facility] EMERGENCY DEPT  As needed, If symptoms worsen [address] [place] [address]  [phone]        Return to ED if worse     Diagnosis     Clinical Impression:   1. Shortness of breath    2. Acute exacerbation of chronic obstructive pulmonary disease (COPD) ([address]    3. Acute bronchitis, unspecified organism    4. Alcohol abuse    5. Smoking greater than 40 pack years        Attestations: This note is prepared by Antonia Greene, acting as Scribe for Gardenia Clement MD.    The scribe's documentation has been prepared under my direction and personally reviewed by me in its entirety.  I confirm that the note above accurately reflects all work, treatment, procedures, and medical decision making performed by me. Aleksey Dempsey MD.               This note will not be viewable in [address]

## 2018-01-20 NOTE — ED NOTES
Patient sleeping, side rails up, call bell w/in reach, no further needs expressed at this time, aware of POC.

## 2018-01-20 NOTE — ED NOTES
Patient resting comfortably, side rails up, call bell w/in reach, no further needs expressed at this time, aware of POC. Pt on the monitor x3.

## 2018-01-20 NOTE — DISCHARGE INSTRUCTIONS
Thank you! Thank you for allowing us to provide you with excellent care today. We hope we addressed all of your concerns and needs. We strive to provide excellent quality care in the Emergency Department. You will receive a survey after your visit to evaluate the care you were provided. Please rate us a level 5 (excellent), as anything less than excellent does not meet our goals. If you feel that you have not received excellent quality care or timely care, please ask to speak to the nurse manager. Please choose us in the future for your continued health care needs. ------------------------------------------------------------------------------------------------------------  The exam and treatment you received in the Emergency Department were for an urgent problem and are not intended as complete care. It is important that you follow up with a doctor, nurse practitioner, or physician assistant for ongoing care. If your symptoms become worse or you do not improve as expected and you are unable to reach your usual health care provider, you should return to the Emergency Department. We are available 24 hours a day. Please take your discharge instructions with you when you go to your follow-up appointment. If you have any problem arranging a follow-up appointment, contact the Emergency Department immediately. If a prescription has been provided, please have it filled as soon as possible to prevent a delay in treatment. Read the entire medication instruction sheet provided to you by the pharmacy. If you have any questions or reservations about taking the medication due to side effects or interactions with other medications, please call your primary care physician or contact the ER to speak with the charge nurse. Make an appointment with your family doctor or the physician you were referred to for follow-up of this visit as instructed on your discharge paperwork, as this is mandatory follow-up. Return to the ER if you are unable to be seen or if you are unable to be seen in a timely manner. If you have any problem arranging the follow-up visit, contact the Emergency Department immediately. Chronic Obstructive Pulmonary Disease (COPD): Care Instructions  Your Care Instructions    Chronic obstructive pulmonary disease (COPD) is a general term for a group of lung diseases, including emphysema and chronic bronchitis. People with COPD have decreased airflow in and out of the lungs, which makes it hard to breathe. The airways also can get clogged with thick mucus. Cigarette smoking is a major cause of COPD. Although there is no cure for COPD, you can slow its progress. Following your treatment plan and taking care of yourself can help you feel better and live longer. Follow-up care is a key part of your treatment and safety. Be sure to make and go to all appointments, and call your doctor if you are having problems. It's also a good idea to know your test results and keep a list of the medicines you take. How can you care for yourself at home? ?Staying healthy  ? · Do not smoke. This is the most important step you can take to prevent more damage to your lungs. If you need help quitting, talk to your doctor about stop-smoking programs and medicines. These can increase your chances of quitting for good. ? · Avoid colds and flu. Get a pneumococcal vaccine shot. If you have had one before, ask your doctor whether you need a second dose. Get the flu vaccine every fall. If you must be around people with colds or the flu, wash your hands often. ? · Avoid secondhand smoke, air pollution, and high altitudes. Also avoid cold, dry air and hot, humid air. Stay at home with your windows closed when air pollution is bad. ?Medicines and oxygen therapy  ? · Take your medicines exactly as prescribed. Call your doctor if you think you are having a problem with your medicine.    ? · You may be taking medicines such as:  ¨ Bronchodilators. These help open your airways and make breathing easier. Bronchodilators are either short-acting (work for 6 to 9 hours) or long-acting (work for 24 hours). You inhale most bronchodilators, so they start to act quickly. Always carry your quick-relief inhaler with you in case you need it while you are away from home. ¨ Corticosteroids (prednisone, budesonide). These reduce airway inflammation. They come in pill or inhaled form. You must take these medicines every day for them to work well. ? · A spacer may help you get more inhaled medicine to your lungs. Ask your doctor or pharmacist if a spacer is right for you. If it is, ask how to use it properly. ? · Do not take any vitamins, over-the-counter medicine, or herbal products without talking to your doctor first.   ? · If your doctor prescribed antibiotics, take them as directed. Do not stop taking them just because you feel better. You need to take the full course of antibiotics. ? · Oxygen therapy boosts the amount of oxygen in your blood and helps you breathe easier. Use the flow rate your doctor has recommended, and do not change it without talking to your doctor first.   Activity  ? · Get regular exercise. Walking is an easy way to get exercise. Start out slowly, and walk a little more each day. ? · Pay attention to your breathing. You are exercising too hard if you cannot talk while you are exercising. ? · Take short rest breaks when doing household chores and other activities. ? · Learn breathing methods-such as breathing through pursed lips-to help you become less short of breath. ? · If your doctor has not set you up with a pulmonary rehabilitation program, talk to him or her about whether rehab is right for you. Rehab includes exercise programs, education about your disease and how to manage it, help with diet and other changes, and emotional support. Diet  ? · Eat regular, healthy meals.  Use bronchodilators about 1 hour before you eat to make it easier to eat. Eat several small meals instead of three large ones. Drink beverages at the end of the meal. Avoid foods that are hard to chew. ? · Eat foods that contain protein so that you do not lose muscle mass. ? · Talk with your doctor if you gain too much weight or if you lose weight without trying. ?Mental health  ? · Talk to your family, friends, or a therapist about your feelings. It is normal to feel frightened, angry, hopeless, helpless, and even guilty. Talking openly about bad feelings can help you cope. If these feelings last, talk to your doctor. When should you call for help? Call 911 anytime you think you may need emergency care. For example, call if:  ? · You have severe trouble breathing. ?Call your doctor now or seek immediate medical care if:  ? · You have new or worse trouble breathing. ? · You cough up blood. ? · You have a fever. ? Watch closely for changes in your health, and be sure to contact your doctor if:  ? · You cough more deeply or more often, especially if you notice more mucus or a change in the color of your mucus. ? · You have new or worse swelling in your legs or belly. ? · You are not getting better as expected. Where can you learn more? Go to http://sydni-nato.info/. Griffin Ceja in the search box to learn more about \"Chronic Obstructive Pulmonary Disease (COPD): Care Instructions. \"  Current as of: May 12, 2017  Content Version: 11.4  © 7910-9429 [employer]. Care instructions adapted under license by [employer] (which disclaims liability or warranty for this information). If you have questions about a medical condition or this instruction, always ask your healthcare professional. [address] any warranty or liability for your use of this information. Learning About Chronic Bronchitis  What is chronic bronchitis?     Chronic bronchitis is long-term swelling and the buildup of mucus in the airways of your lungs. The airways (bronchial tubes) get inflamed and make a lot of mucus. This can narrow or block the airways, making it hard for you to breathe. It is a form of COPD (chronic obstructive pulmonary disease). Chronic bronchitis is usually caused by smoking. But chemical fumes, dust, or air pollution also can cause it over time. What can you expect when you have chronic bronchitis? Chronic bronchitis gets worse over time. You cannot undo the damage to your lungs. Over time, you may find that:  · You get short of breath even when you do simple things like get dressed or fix a meal.  · It is hard to eat or exercise. · You lose weight and feel weaker. Over many years, the swelling and mucus from chronic bronchitis make it more likely that you will get lung infections. But there are things you can do to prevent more damage and feel better. What are the symptoms? The main symptoms of chronic bronchitis are:  · A cough that will not go away. · Mucus that comes up when you cough. · Shortness of breath that gets worse when you exercise. At times, your symptoms may suddenly flare up and get much worse. This is a called an exacerbation (say \"egg[facility]\"). When this happens, your usual symptoms quickly get worse and stay bad. This can be dangerous. You may have to go to the hospital.  How can you keep chronic bronchitis from getting worse? Don't smoke. That is the best way to keep chronic bronchitis from getting worse. If you already smoke, it is never too late to stop. If you need help quitting, talk to your doctor about stop-smoking programs and medicines. These can increase your chances of quitting for good. You can do other things to keep chronic bronchitis from getting worse:  · Avoid bad air. Air pollution, chemical fumes, and dust also can make chronic bronchitis worse. · Get a flu shot every year.  A shot may keep the flu from turning into something more serious, like pneumonia. A flu shot also may lower your chances of having a flare-up. · Get a pneumococcal shot. A shot can prevent some of the serious complications of pneumonia. Ask your doctor how often you should get this shot. How is chronic bronchitis treated? Chronic bronchitis is treated with medicines and oxygen. You also can take steps at home to stay healthy and keep your condition from getting worse. Medicines and oxygen therapy  · You may be taking medicines such as:  ¨ Bronchodilators. These help open your airways and make breathing easier. Bronchodilators are either short-acting (work for 6 to 9 hours) or long-acting (work for 24 hours). You inhale most bronchodilators, so they start to act quickly. Always carry your quick-relief inhaler with you in case you need it while you are away from home. ¨ Corticosteroids. These reduce airway inflammation. They come in pill or inhaled form. You must take these medicines every day for them to work well. ¨ Antibiotics. These medicines are used when you have a bacterial lung infection. · Take your medicines exactly as prescribed. Call your doctor if you think you are having a problem with your medicine. · Oxygen therapy boosts the amount of oxygen in your blood and helps you breathe easier. Use the flow rate your doctor has recommended, and do not change it without talking to your doctor first.  Other care at home  · If your doctor recommends it, get more exercise. Walking is a good choice. Bit by bit, increase the amount you walk every day. Try for at least 30 minutes on most days of the week. · Learn breathing methods-such as breathing through pursed lips-to help you become less short of breath. · If your doctor has not set you up with a pulmonary rehabilitation program, talk to him or her about whether rehab is right for you.  Rehab includes exercise programs, education about your disease and how to manage it, help with diet and other changes, and emotional support. · Eat regular, healthy meals. Use bronchodilators about 1 hour before you eat to make it easier to eat. Eat several small meals instead of three large ones. Drink beverages at the end of the meal. Avoid foods that are hard to chew. Follow-up care is a key part of your treatment and safety. Be sure to make and go to all appointments, and call your doctor if you are having problems. It's also a good idea to know your test results and keep a list of the medicines you take. Where can you learn more? Go to http://sydniSIGFOXnato.info/. Enter T689 in the search box to learn more about \"Learning About Chronic Bronchitis. \"  Current as of: May 12, 2017  Content Version: 11.4  © 3738-3938 [employer]. Care instructions adapted under license by [employer] (which disclaims liability or warranty for this information). If you have questions about a medical condition or this instruction, always ask your healthcare professional. [place] [address] any warranty or liability for your use of this information. COPD Exacerbation Plan: Care Instructions  Your Care Instructions    If you have chronic obstructive pulmonary disease (COPD), your usual shortness of breath could suddenly get worse. You may start coughing more and have more mucus. This flare-up is called a COPD exacerbation (say \"qz-EOC-bb-BAY-shun\"). A lung infection or air pollution could set off an exacerbation. Sometimes it can happen after a quick change in temperature or being around chemicals. Work with your doctor to make a plan for dealing with an exacerbation. You can better manage it if you plan ahead. Follow-up care is a key part of your treatment and safety. Be sure to make and go to all appointments, and call your doctor if you are having problems.  It's also a good idea to know your test results and keep a list of the medicines you take.  How can you care for yourself at home? During an exacerbation  · Do not panic if you start to have one. Quick treatment at home may help you prevent serious breathing problems. If you have a COPD exacerbation plan that you developed with your doctor, follow it. · Take your medicines exactly as your doctor tells you. ¨ Use your inhaler as directed by your doctor. If your symptoms do not get better after you use your medicine, have someone take you to the emergency room. Call an ambulance if necessary. ¨ With inhaled medicines, a spacer or a nebulizer may help you get more medicine to your lungs. Ask your doctor or pharmacist how to use them properly. Practice using the spacer in front of a mirror before you have an exacerbation. This may help you get the medicine into your lungs quickly. ¨ If your doctor has given you steroid pills, take them as directed. ¨ Your doctor may have given you a prescription for antibiotics, which you can fill if you need it. ¨ Talk to your doctor if you have any problems with your medicine. And call your doctor if you have to use your antibiotic or steroid pills. Preventing an exacerbation  · Do not smoke. This is the most important step you can take to prevent more damage to your lungs and prevent problems. If you already smoke, it is never too late to stop. If you need help quitting, talk to your doctor about stop-smoking programs and medicines. These can increase your chances of quitting for good. · Take your daily medicines as prescribed. · Avoid colds and flu. ¨ Get a pneumococcal vaccine. ¨ Get a flu vaccine each year, as soon as it is available. Ask those you live or work with to do the same, so they will not get the flu and infect you. ¨ Try to stay away from people with colds or the flu. ¨ Wash your hands often. · Avoid secondhand smoke; air pollution; cold, dry air; hot, humid air; and high altitudes.  Stay at home with your windows closed when air pollution is bad. · Learn breathing techniques for COPD, such as breathing through pursed lips. These techniques can help you breathe easier during an exacerbation. When should you call for help? Call 911 anytime you think you may need emergency care. For example, call if:  ? · You have severe trouble breathing. ? · You have severe chest pain. ?Call your doctor now or seek immediate medical care if:  ? · You have new or worse shortness of breath. ? · You develop new chest pain. ? · You are coughing more deeply or more often, especially if you notice more mucus or a change in the color of your mucus. ? · You cough up blood. ? · You have new or increased swelling in your legs or belly. ? · You have a fever. ? Watch closely for changes in your health, and be sure to contact your doctor if:  ? · You need to use your antibiotic or steroid pills. ? · Your symptoms are getting worse. Where can you learn more? Go to http://sydni-nato.info/. Enter J338 in the search box to learn more about \"COPD Exacerbation Plan: Care Instructions. \"  Current as of: May 12, 2017  Content Version: 11.4  © 5136-9334 [employer]. Care instructions adapted under license by [employer] (which disclaims liability or warranty for this information). If you have questions about a medical condition or this instruction, always ask your healthcare professional. [address] any warranty or liability for your use of this information.

## 2018-01-20 NOTE — ED NOTES
Pt sleeping, side rails up, call bell w/in reach, no further needs expressed at this time, aware of POC.

## 2018-08-24 ENCOUNTER — APPOINTMENT (OUTPATIENT)
Dept: GENERAL RADIOLOGY | Age: 63
End: 2018-08-24
Attending: EMERGENCY MEDICINE
Payer: SELF-PAY

## 2018-08-24 ENCOUNTER — HOSPITAL ENCOUNTER (EMERGENCY)
Age: 63
Discharge: ANOTHER HOSPITAL | End: 2018-08-24
Attending: EMERGENCY MEDICINE
Payer: SELF-PAY

## 2018-08-24 ENCOUNTER — APPOINTMENT (OUTPATIENT)
Dept: CT IMAGING | Age: 63
End: 2018-08-24
Attending: EMERGENCY MEDICINE
Payer: SELF-PAY

## 2018-08-24 ENCOUNTER — HOSPITAL ENCOUNTER (EMERGENCY)
Age: 63
Discharge: ANOTHER HOSPITAL | End: 2018-08-25
Attending: EMERGENCY MEDICINE
Payer: SELF-PAY

## 2018-08-24 VITALS
RESPIRATION RATE: 23 BRPM | DIASTOLIC BLOOD PRESSURE: 86 MMHG | HEART RATE: 95 BPM | TEMPERATURE: 97.5 F | SYSTOLIC BLOOD PRESSURE: 125 MMHG | BODY MASS INDEX: 27.4 KG/M2 | HEIGHT: 69 IN | WEIGHT: 184.97 LBS | OXYGEN SATURATION: 100 %

## 2018-08-24 VITALS
BODY MASS INDEX: 27.4 KG/M2 | SYSTOLIC BLOOD PRESSURE: 152 MMHG | OXYGEN SATURATION: 100 % | RESPIRATION RATE: 14 BRPM | DIASTOLIC BLOOD PRESSURE: 102 MMHG | HEART RATE: 121 BPM | WEIGHT: 185 LBS | HEIGHT: 69 IN

## 2018-08-24 DIAGNOSIS — J93.0 TENSION PNEUMOTHORAX: ICD-10-CM

## 2018-08-24 DIAGNOSIS — S22.42XA CLOSED FRACTURE OF MULTIPLE RIBS OF LEFT SIDE, INITIAL ENCOUNTER: ICD-10-CM

## 2018-08-24 DIAGNOSIS — J96.01 ACUTE RESPIRATORY FAILURE WITH HYPOXIA (HCC): Primary | ICD-10-CM

## 2018-08-24 DIAGNOSIS — F10.929 ALCOHOLIC INTOXICATION WITH COMPLICATION (HCC): ICD-10-CM

## 2018-08-24 LAB
ALBUMIN SERPL-MCNC: 3.5 G/DL (ref 3.5–5)
ALBUMIN/GLOB SERPL: 1 {RATIO} (ref 1.1–2.2)
ALP SERPL-CCNC: 80 U/L (ref 45–117)
ALT SERPL-CCNC: 17 U/L (ref 12–78)
ANION GAP SERPL CALC-SCNC: 10 MMOL/L (ref 5–15)
AST SERPL-CCNC: 21 U/L (ref 15–37)
BASOPHILS # BLD: 0.1 K/UL (ref 0–0.1)
BASOPHILS NFR BLD: 1 % (ref 0–1)
BILIRUB SERPL-MCNC: 0.2 MG/DL (ref 0.2–1)
BUN SERPL-MCNC: 9 MG/DL (ref 6–20)
BUN/CREAT SERPL: 9 (ref 12–20)
CALCIUM SERPL-MCNC: 7.9 MG/DL (ref 8.5–10.1)
CHLORIDE SERPL-SCNC: 105 MMOL/L (ref 97–108)
CO2 SERPL-SCNC: 24 MMOL/L (ref 21–32)
CREAT SERPL-MCNC: 1.05 MG/DL (ref 0.7–1.3)
DIFFERENTIAL METHOD BLD: ABNORMAL
EOSINOPHIL # BLD: 0.8 K/UL (ref 0–0.4)
EOSINOPHIL NFR BLD: 5 % (ref 0–7)
ERYTHROCYTE [DISTWIDTH] IN BLOOD BY AUTOMATED COUNT: 12.1 % (ref 11.5–14.5)
ETHANOL SERPL-MCNC: 295 MG/DL
GLOBULIN SER CALC-MCNC: 3.4 G/DL (ref 2–4)
GLUCOSE SERPL-MCNC: 108 MG/DL (ref 65–100)
HCT VFR BLD AUTO: 43 % (ref 36.6–50.3)
HGB BLD-MCNC: 15 G/DL (ref 12.1–17)
IMM GRANULOCYTES # BLD: 0.3 K/UL (ref 0–0.04)
IMM GRANULOCYTES NFR BLD AUTO: 2 % (ref 0–0.5)
LYMPHOCYTES # BLD: 5.5 K/UL (ref 0.8–3.5)
LYMPHOCYTES NFR BLD: 38 % (ref 12–49)
MCH RBC QN AUTO: 32.1 PG (ref 26–34)
MCHC RBC AUTO-ENTMCNC: 34.9 G/DL (ref 30–36.5)
MCV RBC AUTO: 92.1 FL (ref 80–99)
MONOCYTES # BLD: 0.8 K/UL (ref 0–1)
MONOCYTES NFR BLD: 6 % (ref 5–13)
NEUTS SEG # BLD: 7.1 K/UL (ref 1.8–8)
NEUTS SEG NFR BLD: 49 % (ref 32–75)
NRBC # BLD: 0 K/UL (ref 0–0.01)
NRBC BLD-RTO: 0 PER 100 WBC
PLATELET # BLD AUTO: 255 K/UL (ref 150–400)
PMV BLD AUTO: 9.5 FL (ref 8.9–12.9)
POTASSIUM SERPL-SCNC: 3.3 MMOL/L (ref 3.5–5.1)
PROT SERPL-MCNC: 6.9 G/DL (ref 6.4–8.2)
RBC # BLD AUTO: 4.67 M/UL (ref 4.1–5.7)
SODIUM SERPL-SCNC: 139 MMOL/L (ref 136–145)
TROPONIN I SERPL-MCNC: <0.05 NG/ML
WBC # BLD AUTO: 14.6 K/UL (ref 4.1–11.1)

## 2018-08-24 PROCEDURE — 77030008683 HC TU ET CUF COVD -A

## 2018-08-24 PROCEDURE — 75810000165 HC THORACENTESIS

## 2018-08-24 PROCEDURE — 31500 INSERT EMERGENCY AIRWAY: CPT

## 2018-08-24 PROCEDURE — 75810000275 HC EMERGENCY DEPT VISIT NO LEVEL OF CARE

## 2018-08-24 PROCEDURE — C1729 CATH, DRAINAGE: HCPCS

## 2018-08-24 PROCEDURE — 96365 THER/PROPH/DIAG IV INF INIT: CPT

## 2018-08-24 PROCEDURE — 77030013140 HC MSK NEB VYRM -A

## 2018-08-24 PROCEDURE — 80053 COMPREHEN METABOLIC PANEL: CPT | Performed by: EMERGENCY MEDICINE

## 2018-08-24 PROCEDURE — 77030037656 HC DRN CHST TU PLEURAGLD KT GTNG -B

## 2018-08-24 PROCEDURE — 94002 VENT MGMT INPAT INIT DAY: CPT

## 2018-08-24 PROCEDURE — 96366 THER/PROPH/DIAG IV INF ADDON: CPT

## 2018-08-24 PROCEDURE — 74011250636 HC RX REV CODE- 250/636: Performed by: EMERGENCY MEDICINE

## 2018-08-24 PROCEDURE — 96360 HYDRATION IV INFUSION INIT: CPT

## 2018-08-24 PROCEDURE — 71045 X-RAY EXAM CHEST 1 VIEW: CPT

## 2018-08-24 PROCEDURE — 99285 EMERGENCY DEPT VISIT HI MDM: CPT

## 2018-08-24 PROCEDURE — 84484 ASSAY OF TROPONIN QUANT: CPT | Performed by: EMERGENCY MEDICINE

## 2018-08-24 PROCEDURE — 93005 ELECTROCARDIOGRAM TRACING: CPT

## 2018-08-24 PROCEDURE — 74011636320 HC RX REV CODE- 636/320: Performed by: EMERGENCY MEDICINE

## 2018-08-24 PROCEDURE — 77030012879 HC MSK CPAP FLL FAC PHIL -B

## 2018-08-24 PROCEDURE — 80307 DRUG TEST PRSMV CHEM ANLYZR: CPT | Performed by: EMERGENCY MEDICINE

## 2018-08-24 PROCEDURE — 71260 CT THORAX DX C+: CPT

## 2018-08-24 PROCEDURE — 51702 INSERT TEMP BLADDER CATH: CPT

## 2018-08-24 PROCEDURE — 81001 URINALYSIS AUTO W/SCOPE: CPT | Performed by: EMERGENCY MEDICINE

## 2018-08-24 PROCEDURE — 85610 PROTHROMBIN TIME: CPT | Performed by: EMERGENCY MEDICINE

## 2018-08-24 PROCEDURE — 85025 COMPLETE CBC W/AUTO DIFF WBC: CPT | Performed by: EMERGENCY MEDICINE

## 2018-08-24 PROCEDURE — 74011250636 HC RX REV CODE- 250/636

## 2018-08-24 PROCEDURE — 96375 TX/PRO/DX INJ NEW DRUG ADDON: CPT

## 2018-08-24 PROCEDURE — 94762 N-INVAS EAR/PLS OXIMTRY CONT: CPT

## 2018-08-24 PROCEDURE — 77030034850

## 2018-08-24 PROCEDURE — 36415 COLL VENOUS BLD VENIPUNCTURE: CPT | Performed by: EMERGENCY MEDICINE

## 2018-08-24 PROCEDURE — 77030008771 HC TU NG SALEM SUMP -A

## 2018-08-24 PROCEDURE — 86900 BLOOD TYPING SEROLOGIC ABO: CPT | Performed by: EMERGENCY MEDICINE

## 2018-08-24 RX ORDER — MIDAZOLAM HYDROCHLORIDE 1 MG/ML
10 INJECTION, SOLUTION INTRAMUSCULAR; INTRAVENOUS
Status: COMPLETED | OUTPATIENT
Start: 2018-08-24 | End: 2018-08-24

## 2018-08-24 RX ORDER — SODIUM CHLORIDE 0.9 % (FLUSH) 0.9 %
5 SYRINGE (ML) INJECTION AS NEEDED
Status: DISCONTINUED | OUTPATIENT
Start: 2018-08-24 | End: 2018-08-25 | Stop reason: HOSPADM

## 2018-08-24 RX ORDER — PROPOFOL 10 MG/ML
0-50 VIAL (ML) INTRAVENOUS
Status: DISCONTINUED | OUTPATIENT
Start: 2018-08-24 | End: 2018-08-25 | Stop reason: HOSPADM

## 2018-08-24 RX ORDER — SODIUM CHLORIDE 9 MG/ML
999 INJECTION, SOLUTION INTRAVENOUS CONTINUOUS
Status: DISCONTINUED | OUTPATIENT
Start: 2018-08-24 | End: 2018-08-25 | Stop reason: HOSPADM

## 2018-08-24 RX ORDER — SODIUM CHLORIDE 9 MG/ML
50 INJECTION, SOLUTION INTRAVENOUS
Status: DISCONTINUED | OUTPATIENT
Start: 2018-08-24 | End: 2018-08-25 | Stop reason: HOSPADM

## 2018-08-24 RX ORDER — SODIUM CHLORIDE 0.9 % (FLUSH) 0.9 %
10 SYRINGE (ML) INJECTION
Status: DISCONTINUED | OUTPATIENT
Start: 2018-08-24 | End: 2018-08-25 | Stop reason: HOSPADM

## 2018-08-24 RX ORDER — SODIUM CHLORIDE 0.9 % (FLUSH) 0.9 %
5 SYRINGE (ML) INJECTION EVERY 8 HOURS
Status: DISCONTINUED | OUTPATIENT
Start: 2018-08-24 | End: 2018-08-25 | Stop reason: HOSPADM

## 2018-08-24 RX ORDER — PROPOFOL 10 MG/ML
INJECTION, EMULSION INTRAVENOUS
Status: COMPLETED
Start: 2018-08-24 | End: 2018-08-24

## 2018-08-24 RX ADMIN — PROPOFOL 45 MCG/KG/MIN: 10 INJECTION, EMULSION INTRAVENOUS at 22:59

## 2018-08-24 RX ADMIN — IOPAMIDOL 90 ML: 755 INJECTION, SOLUTION INTRAVENOUS at 21:55

## 2018-08-24 RX ADMIN — PROPOFOL 35 MCG/KG/MIN: 10 INJECTION, EMULSION INTRAVENOUS at 22:31

## 2018-08-24 RX ADMIN — SODIUM CHLORIDE 999 ML/HR: 900 INJECTION, SOLUTION INTRAVENOUS at 22:30

## 2018-08-24 RX ADMIN — PROPOFOL 50 MCG/KG/MIN: 10 INJECTION, EMULSION INTRAVENOUS at 23:05

## 2018-08-24 RX ADMIN — METHYLPREDNISOLONE SODIUM SUCCINATE 125 MG: 125 INJECTION, POWDER, FOR SOLUTION INTRAMUSCULAR; INTRAVENOUS at 21:21

## 2018-08-24 RX ADMIN — Medication 25 MCG/KG/MIN: at 21:19

## 2018-08-24 RX ADMIN — PROPOFOL 25 MCG/KG/MIN: 10 INJECTION, EMULSION INTRAVENOUS at 21:19

## 2018-08-24 RX ADMIN — MIDAZOLAM HYDROCHLORIDE 5 MG: 1 INJECTION, SOLUTION INTRAMUSCULAR; INTRAVENOUS at 12:00

## 2018-08-25 ENCOUNTER — APPOINTMENT (OUTPATIENT)
Dept: GENERAL RADIOLOGY | Age: 63
End: 2018-08-25
Attending: EMERGENCY MEDICINE
Payer: SELF-PAY

## 2018-08-25 LAB
ABO + RH BLD: NORMAL
AMPHET UR QL SCN: NEGATIVE
APPEARANCE UR: CLEAR
ATRIAL RATE: 130 BPM
BACTERIA URNS QL MICRO: NEGATIVE /HPF
BARBITURATES UR QL SCN: NEGATIVE
BENZODIAZ UR QL: NEGATIVE
BILIRUB UR QL: NEGATIVE
BLOOD GROUP ANTIBODIES SERPL: NORMAL
CALCULATED R AXIS, ECG10: 73 DEGREES
CALCULATED T AXIS, ECG11: 78 DEGREES
CANNABINOIDS UR QL SCN: NEGATIVE
COCAINE UR QL SCN: NEGATIVE
COLOR UR: NORMAL
DIAGNOSIS, 93000: NORMAL
DRUG SCRN COMMENT,DRGCM: NORMAL
EPITH CASTS URNS QL MICRO: NORMAL /LPF
GLUCOSE UR STRIP.AUTO-MCNC: NEGATIVE MG/DL
HGB UR QL STRIP: NEGATIVE
HYALINE CASTS URNS QL MICRO: NORMAL /LPF (ref 0–5)
INR PPP: 1 (ref 0.9–1.1)
KETONES UR QL STRIP.AUTO: NEGATIVE MG/DL
LEUKOCYTE ESTERASE UR QL STRIP.AUTO: NEGATIVE
METHADONE UR QL: NEGATIVE
NITRITE UR QL STRIP.AUTO: NEGATIVE
OPIATES UR QL: NEGATIVE
P-R INTERVAL, ECG05: 128 MS
PCP UR QL: NEGATIVE
PH UR STRIP: 5.5 [PH] (ref 5–8)
PROT UR STRIP-MCNC: NEGATIVE MG/DL
PROTHROMBIN TIME: 10.3 SEC (ref 9–11.1)
Q-T INTERVAL, ECG07: 390 MS
QRS DURATION, ECG06: 82 MS
QTC CALCULATION (BEZET), ECG08: 573 MS
RBC #/AREA URNS HPF: NORMAL /HPF (ref 0–5)
SP GR UR REFRACTOMETRY: 1.01 (ref 1–1.03)
SPECIMEN EXP DATE BLD: NORMAL
UA: UC IF INDICATED,UAUC: NORMAL
UROBILINOGEN UR QL STRIP.AUTO: 0.2 EU/DL (ref 0.2–1)
VENTRICULAR RATE, ECG03: 130 BPM
WBC URNS QL MICRO: NORMAL /HPF (ref 0–4)

## 2018-08-25 PROCEDURE — 94003 VENT MGMT INPAT SUBQ DAY: CPT

## 2018-08-25 NOTE — ED NOTES
Assumed care of pt. From EMS. Pt. Presents to the ED with chief complaint of respiratory distress. EMS reports pt was found by his neighbors after they heard him yelling for help. EMS reports unknown information or history. EMS reports that pt was found by a bunch of beer outside. Pt sinus tach on the monitor. EMS gave 50mg Benadryl, 40mg Lasix, and 1 in Nitro paste to left chest.     Cardiac monitor x 3. Stretcher locked in the lowest position.

## 2018-08-25 NOTE — PROGRESS NOTES
Pt arrived to  ED 19 on CPAP. Pt changed over to Bipap mask . Pt with increased facial swelling and upper airway crepetus noted. Decision was made to intubate patient.

## 2018-08-25 NOTE — ED PROVIDER NOTES
EMERGENCY DEPARTMENT HISTORY AND PHYSICAL EXAM      Date: 8/24/2018  Patient Name: Rob Jones    History of Presenting Illness     Chief Complaint   Patient presents with    Respiratory Distress     Pt presents to the ED via EMS for respiratory distress. EMS reports giving 50mg Benadryl and 40mg Lasix en route to the ED. EMS placed 1 in nitro paste to left chest.        History Provided By: EMS    HPI: Jayson Rodriguez, 50 y.o. male with PMHx significant for ***, presents via EMS to the ED with cc of respiratory distress. Per EMS, pt was found by his neighbors after hearing him screaming for help and c/o that he couldn't breathe. EMS reports that the pt was found to be edematous with his eyes swollen shut. EMS reports they put him on CPAP. EMS reports they gave the pt 50 mg Benadryl, 40 mg Lasix, and 1 in nitro paste to L chest.     HPI is limited due to pt's severe respiratory distress. Pt is poor historian. PCP: No primary care provider on file. Past History     Past Medical History:  No past medical history on file. Past Surgical History:  No past surgical history on file. Family History:  No family history on file. Social History:  Social History   Substance Use Topics    Smoking status: Not on file    Smokeless tobacco: Not on file    Alcohol use Not on file       Allergies: Allergies not on file      Review of Systems   Review of Systems   Unable to perform ROS: Severe respiratory distress       Physical Exam   Physical Exam   Constitutional: He is oriented to person, place, and time. He appears well-developed and well-nourished. No distress. HENT:   Head: Normocephalic. Nose: Nose normal.   Mouth/Throat: Oropharynx is clear and moist. No oropharyngeal exudate. No tongue swelling   Eyes: Conjunctivae are normal. Pupils are equal, round, and reactive to light. No scleral icterus. Eyes swollen shut   Neck: Normal range of motion. Neck supple. No JVD present. No tracheal deviation present. No thyromegaly present. Cardiovascular: Regular rhythm and intact distal pulses. Tachycardia present. Exam reveals no gallop and no friction rub. No murmur heard. Pulmonary/Chest: Effort normal. No stridor. He has no wheezes. He has rales (bilateral). Diffuse subcutaneous emphysema to thorax, neck, face, and BLE  Moderate respiratory distress  On CPAP PTA   Abdominal: Soft. Bowel sounds are normal. He exhibits no distension. There is no tenderness. There is no rebound and no guarding. Musculoskeletal: Normal range of motion. He exhibits no edema. No edema to BLE  Moving all extremities spontaneously   Lymphadenopathy:     He has no cervical adenopathy. Neurological: He is alert and oriented to person, place, and time. No cranial nerve deficit. He exhibits normal muscle tone. Coordination normal.   Exam limited due to respiratory distress   Skin: Skin is warm and dry. No rash noted. He is not diaphoretic. No erythema. Psychiatric: He has a normal mood and affect. His behavior is normal.   Nursing note and vitals reviewed. Diagnostic Study Results     Labs -     Recent Results (from the past 12 hour(s))   EKG, 12 LEAD, INITIAL    Collection Time: 08/24/18  9:07 PM   Result Value Ref Range    Ventricular Rate 130 BPM    Atrial Rate 130 BPM    P-R Interval 128 ms    QRS Duration 82 ms    Q-T Interval 390 ms    QTC Calculation (Bezet) 573 ms    Calculated R Axis 73 degrees    Calculated T Axis 78 degrees    Diagnosis       Sinus tachycardia with occasional premature ventricular complexes  Low voltage QRS  Septal infarct , age undetermined  No previous ECGs available         Radiologic Studies -   XR CHEST PORT   Final Result      CT CHEST W CONT    (Results Pending)     CT Results  (Last 48 hours)    None        CXR Results  (Last 48 hours)               08/24/18 2109  XR CHEST PORT Final result    Impression:  IMPRESSION:   Large amount of subcutaneous emphysema.  Left pneumothorax cannot be excluded and   there is mild shift of the mediastinum to the right. Acute rib fractures noted   on the left. Narrative:  INDICATION: Respiratory distress       COMPARISON: None       A single frontal view was obtained. The time of this study is 2104 hours. Endotracheal tube is in satisfactory position. There is subcutaneous emphysema   involving the entire thorax. This does obscure lung fields. Possibility of left   pneumothorax cannot be excluded inferiorly. In addition there is some shift of   the mediastinum to the right suggesting the possibility of left pneumothorax. There is a rib fracture which may be acute/subacute on the left at rib 8. Acute   rib fracture is noted on the left at the fifth rib. Possible rib fracture also   noted at the seventh rib. On the right there is a healed rib fracture at the   sixth rib. Healed rib fracture at the eighth rib also noted. Heart size normal..                               Medical Decision Making   I am the first provider for this patient. I reviewed the vital signs, available nursing notes, past medical history, past surgical history, family history and social history. Vital Signs-Reviewed the patient's vital signs. Patient Vitals for the past 12 hrs:   Pulse Resp BP SpO2   08/24/18 2103 (!) 121 14 - 100 %   08/24/18 2054 (!) 106 22 (!) 152/102 99 %       EKG interpretation: (Preliminary)  Rhythm: {[facility] ED EKG RHYTHM:72239}; and {[facility] ED EKG RATE:84324}. Rate (approx.): ***; Axis: {[facility] ED EKG AXIS DEVIATION:85508}; VA interval: {[facility] ED EKG P WAVE:18129}; QRS interval: {[facility] EKG QRS INTERVAL:01032}; ST/T wave: {[facility] EKG ST/T IMBR:14999}; Other findings: {[facility] ED EKG OTHER RPVIXYKO:06255}. Written by ***, ED Scribe, as dictated by ***.     Records Reviewed: Nursing Notes, Old Medical Records and Ambulance Run Sheet    Provider Notes (Medical Decision Making):   DDx: PTX, esophageal rupture, ACS, fluid retention    ED Course:   Initial assessment performed. The patients presenting problems have been discussed, and they are in agreement with the care plan formulated and outlined with them. I have encouraged them to ask questions as they arise throughout their visit. Procedure Note - Orotracheal Intubation:   9:00 PM  Performed by: Sarah Beth Martini MD   Indication for procedure: respiratory failure  RSI performed. The patient was sedated with 20 mg of etomidate 100 mg of rocuronium and orotracheally intubated with a 7.5 cuffed [place] endotracheal tube using a mac 4 blade with direct visualization. Tube was advanced to 23 cm at the lip. ETT location confirmed by bilateral, symmetric breath sounds, good end-tidal CO2 detector color change , no breath sounds over stomach and chest x-ray visualization. Number of attempts: 1  Complications: none  RSI was used. The procedure took 1-15 minutes, and pt tolerated well. Written by Hubert Bermudez ED Scribe, as dictated by Sarah Beth Martini MD.       Critical Care Time:   CRITICAL CARE NOTE :    9:34 PM      IMPENDING DETERIORATION -{IMPENDING DETERIORATION:20224}    ASSOCIATED RISK FACTORS - {ASSOCIATED RISK FACTORS:89468}    MANAGEMENT- {MANAGEMENT:52471}    INTERPRETATION -  {INTERPRETATION:63362}    INTERVENTIONS - {INTERVENTIONS:21989}    CASE REVIEW - {CASE REVIEW:20229}    TREATMENT RESPONSE -{TREATMENT RESPONSE:20230}    PERFORMED BY - {PERFORMED BY:20232}        NOTES   :      I have spent *** minutes of critical care time involved in lab review, consultations with specialist, family decision- making, bedside attention and documentation. During this entire length of time I was immediately available to the patient . Disposition:  ***    PLAN:  1. There are no discharge medications for this patient. 2.   Follow-up Information     None        Return to ED if worse     Diagnosis     Clinical Impression: No diagnosis found. Attestations:     This note is prepared by Hubert Bermudez, acting as Scribe for Temo Sneed MD.    Temo Sneed MD: The scribe's documentation has been prepared under my direction and personally reviewed by me in its entirety. I confirm that the note above accurately reflects all work, treatment, procedures, and medical decision making performed by me.

## 2018-08-25 NOTE — ED NOTES
Pt has crepitus to his upper extremities, face and chest.    Pt's face edematous and swollen. Pt unable to open eyes due to them being swollen shut.

## 2018-08-25 NOTE — ED PROVIDER NOTES
EMERGENCY DEPARTMENT HISTORY AND PHYSICAL EXAM      Date: 8/24/2018  Patient Name: Jayleen Barry    History of Presenting Illness     Chief Complaint   Patient presents with    Respiratory Distress       History Provided By: EMS    HPI: Jayleen Barry, 70 y.o. male with no significant PMHx, presents via EMS to the ED with cc of respiratory distress. Per EMS, pt was found by his neighbors after hearing him screaming for help and c/o that he couldn't breathe. EMS reports that the pt was found to be edematous with his eyes swollen shut. EMS reports they put him on CPAP. EMS reports they gave the pt 50 mg Benadryl, 40 mg Lasix, and 1 in nitro paste to L chest.     HPI is limited due to pt's severe respiratory distress. Pt is poor historian. PCP: Concepcion Rea MD    Current Facility-Administered Medications   Medication Dose Route Frequency Provider Last Rate Last Dose    SALINE PERIPHERAL FLUSH Q8H soln 5 mL  5 mL InterCATHeter Paris Correia MD        saline peripheral flush soln 5 mL  5 mL InterCATHeter PRN Maggi Ronquillo MD        propofol (DIPRIVAN) infusion  0-50 mcg/kg/min IntraVENous TITRATE Maggi Ronquillo MD 25.2 mL/hr at 08/24/18 2305 50 mcg/kg/min at 08/24/18 2305    0.9% sodium chloride infusion  999 mL/hr IntraVENous CONTINUOUS Maggi Ronquillo MD        midazolam (VERSED) injection 10 mg  10 mg IntraVENous NOW Maggi Ronquillo MD         Current Outpatient Prescriptions   Medication Sig Dispense Refill    albuterol (PROVENTIL HFA, VENTOLIN HFA, PROAIR HFA) 90 mcg/actuation inhaler Take 2 Puffs by inhalation every four (4) hours as needed for Wheezing. 1 Inhaler 0    albuterol (ACCUNEB) 1.25 mg/3 mL nebu Take 3 mL by inhalation every four (4) hours as needed (wheezing). 25 Each 0    chlordiazePOXIDE (LIBRIUM) 25 mg capsule Take 1 Cap by mouth continuous.  Take 2 tabs po q6hrs on day 1  Take 1 tab po q6hrs on day 2  Take 1 tab po q12hrs on day 3  Take 1 tab po qhs on day 4 15 Cap 0 Past History     Past Medical History:  History reviewed. No pertinent past medical history. Past Surgical History:  History reviewed. No pertinent surgical history. Family History:  History reviewed. No pertinent family history. Social History:  Social History   Substance Use Topics    Smoking status: Current Every Day Smoker     Packs/day: 2.00    Smokeless tobacco: None    Alcohol use Yes      Comment: 2 bottles of wine x 40 years, 24 beers per day       Allergies:  No Known Allergies      Review of Systems   Review of Systems   Unable to perform ROS: Severe respiratory distress       Physical Exam   Physical Exam   Constitutional: He appears well-developed and well-nourished. He appears distressed. HENT:   Head: Normocephalic and atraumatic. Nose: Nose normal.   Mouth/Throat: Oropharynx is clear and moist. No oropharyngeal exudate. No tongue swelling   Eyes: Conjunctivae are normal. Pupils are equal, round, and reactive to light. No scleral icterus. Eyes swollen shut bilaterally; Neck: No JVD present. No tracheal deviation present. No thyromegaly present. Cardiovascular: Regular rhythm and intact distal pulses. Tachycardia present. Exam reveals no gallop and no friction rub. No murmur heard. Pulmonary/Chest: No stridor. He is in respiratory distress. He has no wheezes. He has rales (bilaterally). Moderate respiratory distress on CPAP pta  Diffuse subcutaneous emphysema to thorax, neck, face, BLE; skin intact; Abdominal: Soft. Bowel sounds are normal. He exhibits no distension. There is no tenderness. There is no rebound and no guarding. Musculoskeletal: Normal range of motion. No edema BLE  Moving all extremities spontaneously   Lymphadenopathy:     He has no cervical adenopathy. Neurological: He is alert. Not following commands; respiratory distress, indiscernible gasping speech; moves all extremities spontaneously;    Skin: Skin is warm and dry. No rash noted. He is not diaphoretic. No erythema. Nursing note and vitals reviewed. Diagnostic Study Results     Labs -     Recent Results (from the past 12 hour(s))   ETHYL ALCOHOL    Collection Time: 08/24/18  9:33 PM   Result Value Ref Range    ALCOHOL(ETHYL),SERUM 295 (H) <10 MG/DL   TYPE & SCREEN    Collection Time: 08/24/18  9:33 PM   Result Value Ref Range    Crossmatch Expiration 08/27/2018     ABO/Rh(D) [place] Buffy POSITIVE     Antibody screen NEG    CBC WITH AUTOMATED DIFF    Collection Time: 08/24/18  9:33 PM   Result Value Ref Range    WBC 14.6 (H) 4.1 - 11.1 K/uL    RBC 4.67 4.10 - 5.70 M/uL    HGB 15.0 12.1 - 17.0 g/dL    HCT 43.0 36.6 - 50.3 %    MCV 92.1 80.0 - 99.0 FL    MCH 32.1 26.0 - 34.0 PG    MCHC 34.9 30.0 - 36.5 g/dL    RDW 12.1 11.5 - 14.5 %    PLATELET 420 450 - 344 K/uL    MPV 9.5 8.9 - 12.9 FL    NRBC 0.0 0  WBC    ABSOLUTE NRBC 0.00 0.00 - 0.01 K/uL    NEUTROPHILS 49 32 - 75 %    LYMPHOCYTES 38 12 - 49 %    MONOCYTES 6 5 - 13 %    EOSINOPHILS 5 0 - 7 %    BASOPHILS 1 0 - 1 %    IMMATURE GRANULOCYTES 2 (H) 0.0 - 0.5 %    ABS. NEUTROPHILS 7.1 1.8 - 8.0 K/UL    ABS. LYMPHOCYTES 5.5 (H) 0.8 - 3.5 K/UL    ABS. MONOCYTES 0.8 0.0 - 1.0 K/UL    ABS. EOSINOPHILS 0.8 (H) 0.0 - 0.4 K/UL    ABS. BASOPHILS 0.1 0.0 - 0.1 K/UL    ABS. IMM. GRANS. 0.3 (H) 0.00 - 0.04 K/UL    DF AUTOMATED     METABOLIC PANEL, COMPREHENSIVE    Collection Time: 08/24/18  9:33 PM   Result Value Ref Range    Sodium 139 136 - 145 mmol/L    Potassium 3.3 (L) 3.5 - 5.1 mmol/L    Chloride 105 97 - 108 mmol/L    CO2 24 21 - 32 mmol/L    Anion gap 10 5 - 15 mmol/L    Glucose 108 (H) 65 - 100 mg/dL    BUN 9 6 - 20 MG/DL    Creatinine 1.05 0.70 - 1.30 MG/DL    BUN/Creatinine ratio 9 (L) 12 - 20      GFR est AA >60 >60 ml/min/1.73m2    GFR est non-AA >60 >60 ml/min/1.73m2    Calcium 7.9 (L) 8.5 - 10.1 MG/DL    Bilirubin, total 0.2 0.2 - 1.0 MG/DL    ALT (SGPT) 17 12 - 78 U/L    AST (SGOT) 21 15 - 37 U/L    Alk.  phosphatase 80 45 - 117 U/L Protein, total 6.9 6.4 - 8.2 g/dL    Albumin 3.5 3.5 - 5.0 g/dL    Globulin 3.4 2.0 - 4.0 g/dL    A-G Ratio 1.0 (L) 1.1 - 2.2     TROPONIN I    Collection Time: 08/24/18  9:33 PM   Result Value Ref Range    Troponin-I, Qt. <0.05 <0.05 ng/mL   URINALYSIS W/ REFLEX CULTURE    Collection Time: 08/24/18 11:26 PM   Result Value Ref Range    Color YELLOW/STRAW      Appearance CLEAR CLEAR      Specific gravity 1.014 1.003 - 1.030      pH (UA) 5.5 5.0 - 8.0      Protein NEGATIVE  NEG mg/dL    Glucose NEGATIVE  NEG mg/dL    Ketone NEGATIVE  NEG mg/dL    Bilirubin NEGATIVE  NEG      Blood NEGATIVE  NEG      Urobilinogen 0.2 0.2 - 1.0 EU/dL    Nitrites NEGATIVE  NEG      Leukocyte Esterase NEGATIVE  NEG      WBC 0-4 0 - 4 /hpf    RBC 0-5 0 - 5 /hpf    Epithelial cells FEW FEW /lpf    Bacteria NEGATIVE  NEG /hpf    UA:UC IF INDICATED CULTURE NOT INDICATED BY UA RESULT CNI      Hyaline cast 2-5 0 - 5 /lpf   PROTHROMBIN TIME + INR    Collection Time: 08/24/18 11:28 PM   Result Value Ref Range    INR 1.0 0.9 - 1.1      Prothrombin time 10.3 9.0 - 11.1 sec       Radiologic Studies -   XR CHEST PORT   Final Result      XR ABD (KUB)    (Results Pending)     CT Results  (Last 48 hours)    None        CXR Results  (Last 48 hours)               08/24/18 2245  XR CHEST PORT Final result    Impression:  IMPRESSION:       Left chest tube appears to be in good position. Resolution of tension   pneumothorax. Small residual left apical pneumothorax. Endotracheal tube is in   good position. Unchanged extensive soft tissue gas and pneumomediastinum. Narrative:  EXAM:  XR CHEST PORT       INDICATION:  Left tension pneumothorax. Chest tube placement. Respiratory   distress, intubation. COMPARISON: CT chest earlier today. TECHNIQUE:    Supine portable chest AP view       FINDINGS: Left chest tube appears to be in good position. Left pneumothorax is   decreased and small. There is no longer shift of the mediastinum. Endotracheal   tube is in good position. The pulmonary vasculature is within normal limits. Pneumomediastinum is unchanged. Chest wall gas is unchanged. Multiple rib fractures are unchanged. No pneumonia. Medical Decision Making   I am the first provider for this patient. I reviewed the vital signs, available nursing notes, past medical history, past surgical history, family history and social history. Vital Signs-Reviewed the patient's vital signs. Patient Vitals for the past 12 hrs:   Temp Pulse Resp BP SpO2   08/24/18 2345 - 95 23 125/86 100 %   08/24/18 2324 97.5 °F (36.4 °C) 86 14 108/80 100 %   08/24/18 2315 - 87 19 108/80 100 %   08/24/18 2312 - 86 17 - 100 %   08/24/18 2311 - 86 21 - -   08/24/18 2310 - 89 17 115/77 -   08/24/18 2245 - 97 20 (!) 138/91 100 %   08/24/18 2243 - 95 16 - 100 %   08/24/18 2200 - (!) 117 14 (!) 138/98 100 %   08/24/18 2156 - (!) 117 13 - 100 %   08/24/18 2154 - - - (!) 142/95 -       Pulse Oximetry Analysis - 100% on vent    Cardiac Monitor:   Rate: 117 bpm  Rhythm: sinus tachycardia          Records Reviewed: Nursing Notes, Old Medical Records and Ambulance Run Sheet    Provider Notes (Medical Decision Making):   DDx: PTX, esophageal rupture, ACS, fluid retention    ED Course:   Initial assessment performed. The patients presenting problems have been discussed, and they are in agreement with the care plan formulated and outlined with them. I have encouraged them to ask questions as they arise throughout their visit. Procedure Note - Orotracheal Intubation:   9:00 PM  Performed by: Tanya Munguia MD   Indication for procedure: respiratory failure  RSI performed. The patient was sedated with 20 mg of etomidate 100 mg of rocuronium and orotracheally intubated with a 7.5 cuffed [place] of [place] endotracheal tube using a mac 4 blade with direct visualization. Tube was advanced to 23 cm at the lip.  ETT location confirmed by bilateral, symmetric breath sounds, good end-tidal CO2 detector color change , no breath sounds over stomach and chest x-ray visualization. Number of attempts: 1  Complications: none  RSI was used. The procedure took 1-15 minutes, and pt tolerated well. Written by FRANCISCA Tom, as dictated by Cynthia Flanagan MD.     PROGRESS NOTE:  10:20 PM  CT chest with L PTX with several acute rib fractures. Will place chest tube. Written by FRANCISCA Tom, as dictated by Cynthia Flanagan MD.     Procedure Note - Chest Tube Placement:   10:25 PM  Performed by: Cynthia Flanagan MD    Immediately prior to the procedure, the patient was reevaluated and found suitable for the planned procedure and any planned medications. Immediately prior to the procedure a time out was called to verify the correct patient, procedure, equipment, staff, and marking as appropriate. Area was prepped with Chlorprep and draped in sterile fashion. Area was anesthetized with 5 mLs of lidocaine 1% without epinephrine, being careful to anesthetize the pleura as well as the area surrounding the intercostal space and rib. A 2 cm incision was made to the 5th intercostal space with a #11 blade. Blunt forceps were used to the parietal pleura and were used to penetrate the parietal and visceral pleura. A 24 fr tube was placed toward the apical region. The tube was secured in place. Patient tolerated the procedure well with no complications. Estimated blood loss: 5cc  Procedure took 16-30 minutes, and pt tolerated well. Consult Note:  10:47 Gera England MD spoke with Dr. Sherron Fowler,  Specialty: ER Attending  Discussed pt's hx, disposition, and available diagnostic and imaging results. Reviewed care plans. Consultant agrees to accept pt transfer to [facility] ED.       Critical Care Time:   CRITICAL CARE NOTE :    9:34 PM      IMPENDING DETERIORATION -Airway, Respiratory, Cardiovascular, CNS and Metabolic    ASSOCIATED RISK FACTORS - Hypotension, Shock, Hypoxia, Dysrhythmia, Metabolic changes, Vascular Compromise and CNS Decompensation    MANAGEMENT- Bedside Assessment and Supervision of Care    INTERPRETATION -  Xrays, CT Scan, Blood Gases, ECG and Blood Pressure    INTERVENTIONS - hemodynamic mngmt, vent mngmt, Metobolic interventions and chest tube insertion, intubation    CASE REVIEW - Medical Sub-Specialist, Nursing and Family    TREATMENT RESPONSE -Improved    PERFORMED BY - Self        NOTES   :      I have spent 65 minutes of critical care time involved in lab review, consultations with specialist, family decision- making, bedside attention and documentation. During this entire length of time I was immediately available to the patient . Limited hx due to physical condition of patient on arrival in moderate resp distress on cpap, and not answering questions. Suspect fall while intoxicated with multiple rib fx and resultant left sided tension ptx; intubated pt; placed chest tube with resolution of ptx and mediastinal shift; vss; no evidence of head or neck or extremity trauma; abd exam ltd due to extensive subq emphysema; Dominguez Boone MD        Disposition:  TRANSFER NOTE:  10:48 PM  Pt is being transferred to [facility] ED, transfer accepted by Dr. Taj Shultz. Will send to [facility] as trauma alert; further ct imaging to be obtained there; The reasons for pt's transfer have been discussed with the pt and available family. They convey agreement and understanding for the need to be transferred as explained to them by Dominguez Boone MD.    12:00 AM  LAST LOOK:  Rectal temperature: 97.5 farenheit  Pulse: 95  Respiratory: 22  Blood Pressure: 125/86  Pulse Oximetry: 100%  Just prior to transfer, I re-evaluated the patient. Pertinent physical exam is unchanged; intubated. Vitals reviewed and patient/family given a chance to ask questions. All agree with the plan to transfer. At this time, I feel that Arvis Cranker is stable for transfer.   Written by Rhona Schmidt as dictated by Rivka Willis MD      Diagnosis     Clinical Impression:   1. Acute respiratory failure with hypoxia ([address]    2. Alcoholic intoxication with complication ([address]    3. Tension pneumothorax    4. Closed fracture of multiple ribs of left side, initial encounter        Attestations: This note is prepared by Vanesa Herrera, acting as Scribe for Ron Jaramillo MD.        Ron Jaramillo MD: The scribe's documentation has been prepared under my direction and personally reviewed by me in its entirety. I confirm that the note above accurately reflects all work, treatment, procedures, and medical decision making performed by me.

## 2018-08-25 NOTE — ED NOTES
TRANSFER - OUT REPORT:    Verbal report given to MAITE Delcid(name) on Leane Goldmann  being transferred to [address] [place] [address] Emergency Room(unit) for urgent transfer       Report consisted of patients Situation, Background, Assessment and   Recommendations(SBAR). Information from the following report(s) SBAR, ED Summary, Procedure Summary, Intake/Output, MAR, Recent Results and Cardiac Rhythm NSR was reviewed with the receiving nurse. Lines:   Peripheral IV 08/24/18 Right Antecubital (Active)   Site Assessment Clean, dry, & intact 8/24/2018 10:45 PM   Phlebitis Assessment 0 8/24/2018 10:45 PM   Infiltration Assessment 0 8/24/2018 10:45 PM   Dressing Status Clean, dry, & intact 8/24/2018 10:45 PM   Dressing Type Transparent;Tape 8/24/2018 10:45 PM   Hub Color/Line Status Green 8/24/2018 10:45 PM       Peripheral IV 08/24/18 Left Forearm (Active)   Site Assessment Clean, dry, & intact 8/24/2018 10:45 PM   Phlebitis Assessment 0 8/24/2018 10:45 PM   Infiltration Assessment 0 8/24/2018 10:45 PM   Dressing Status Clean, dry, & intact 8/24/2018 10:45 PM   Dressing Type Tape;Transparent 8/24/2018 10:45 PM   Hub Color/Line Status Pink 8/24/2018 10:45 PM        Opportunity for questions and clarification was provided.       Patient transported with:   Registered Nurse  Tech

## 2018-08-25 NOTE — ED NOTES
Patient originally registered as a Markos Leija. Here is the charting from the second chart.       Changes [x]Notes [x]Flowsheets/Assessments [x]Orders [x]LDA [x]Flowsheet Filed [x]Med Admins      Time Event Details User           21:23 Orders Acknowledged New - propofol (DIPRIVAN) infusion BE    21:23 Orders Acknowledged New - methylPREDNISolone (PF) (SOLU-MEDROL) injection 125 mg BE    21:21 methylPREDNISolone (PF) (SOLU-MEDROL) injection 125 mg Given Dose: 125 mg Route: IntraVENous  Line: Peripheral IV 08/24/18 Left Forearm BE    21:19 VS-I/O-MAR RASS - [place]ooten Agitation Sedation Scale (RASS): Light sedation BE    21:19 propofol (DIPRIVAN) infusion New Bag Linked to override pull: propofol (DIPRIVAN) 10 mg/mL injection  Dose: 25 mcg/kg/min Rate: 12.6 mL/hr Route: IntraVENous  Line: Peripheral IV 08/24/18 Right Antecubital BE    21:15 Orders Acknowledged Modified - XR CHEST PORT  Comment:  (Modified from XR CHEST PA LAT) BE    21:15 Orders Acknowledged New - ETHYL ALCOHOL BE    21:15 Orders Acknowledged New - INTUBATE PATIENT BE    21:15 Orders Acknowledged New - TROPONIN I; TYPE & SCREEN BE    21:15 Orders Acknowledged New - PROTHROMBIN TIME + INR BE    21:15 Orders Acknowledged New - METABOLIC PANEL, COMPREHENSIVE BE    21:15 Orders Acknowledged New - CBC WITH AUTOMATED DIFF BE    21:14 Vital Signs Height/Weight/Head Circumference - Height: 5' 9\" (175.3 cm) Weight: 83.9 kg (185 lb) Weight Source: Estimated BSA (calculated - sq m): 2.02 sq meters BMI (calculated): 27.3 BE    21:09 Vito Scale Vito Scale (11years of age and older) - Sensory Perception: 4 Moisture: 4 Activity: 4 Mobility: 4 Nutrition: 4 Friction and Shear: 3 Vito Score: 23  Vito Scale Groups - Vito Scale Groups: Vito Scale (Greater than 11years of age) BE    21:09 Malnutrition Screen Malnutrition Screening Tool (MST) - Recently Lost Weight Without Trying: No Eating Poorly Due to Decreased Appetite: No MST Score: 0 Pregnant (Not in Labor): N/A Enteral/Parenteral Nutrition Prior to Admission: No BE    21:09 Safety Safety - Safety Measures: Bed/Chair-Wheels locked; Bed in low position; Call light within reach; Nurse at bedside; Side rails X2 BE    21:09 Comprehensive Triage Complete  BE    21:09 Screenings Unable to Assess - Unable to Assess: TB Screen; Palliative Screen; Communication Barriers; Psychosocial Screen; Blood Refusal  Abuse/Neglect Screening - Physical Abuse/Neglect: Patient unable to answer Sexual Abuse: Patient unable to answer Verbal Abuse: Patient unable to answer Other Abuse/Issues: Patient unable to answer  Blood Refusal - Patient Objects to Receiving Blood: Unknown BE    21:09 Immunization Status Immunizations - Immunizations Up-To-Date: Unknown BE    21:08 Adult GCS Joyce Coma Scale - Eye Opening: To speech Best Verbal Response: Inappropriate words Best Motor Response: Withdraws from pain Joyce Coma Scale Score: 10 BE    21:08 Continuous Pulse Ox Other flowsheet entries - Continuous Pulse Ox?: Yes BE    21:08 Cardiac Monitor Cardiac/Telemetry - Cardiac/Telemetry Monitor On: Yes Alarm Audible: Yes Alarms Set: Yes Electrodes Replaced: Yes BE    21:08 Specimen Collection Lab draw - Lab Draw: Labs drawn Technique:  With IV start Number of Attempts: 1 Orientation : Right Location: Antecubital BE    21:08 Peripheral IV 08/24/18 Right Antecubital Assessment Hub Color/Line Status: Green Site Assessment: Clean, dry, & intact Dressing Status: Clean, dry, & intact Dressing Type: Tape; Transparent Phlebitis Assessment: No symptoms Infiltration Assessment: No symptoms BE    21:07 Peripheral IV 08/24/18 Left Forearm Assessment Hub Color/Line Status: Pink Site Assessment: Clean, dry, & intact Dressing Status: Clean, dry, & intact Dressing Type: Tape; Transparent Phlebitis Assessment: No symptoms Infiltration Assessment: No symptoms BE    21:07 EKG EKG (by ED Staff Only) - EKG Done by ED Staff?: Yes EKG Given to Physician?: Yes EKG reviewed time: 2110 Physician reading EKG: Dr. Justin Laguna    21:07 Peripheral IV 08/24/18 Right Antecubital Placed Placement Date/Time: 08/24/18 2107 Number of Attempts: 1 Inserted By: Rosa M Ruelas RN Present on Admission/Arrival: No Size: 18 G Orientation: Right Location: Antecubital BE    21:03 VS-I/O-MAR Vitals - Pulse (Heart Rate): 121 Resp Rate: 14 O2 Sat (%): 100 %  Oxygen Therapy - O2 Sat (%): 100 % FIO2 (%): 100 % JJ    21:03 Vital Signs Vital Signs - Pulse (Heart Rate): 121 Resp Rate: 14  Oxygen Therapy - O2 Sat (%): 100 % FIO2 (%): 100 % JJ    21:03 Airway - Endotracheal Tube 08/24/18 Oral Assessment Insertion Depth (cm): 23 cm Side Secured: Right Line Lyndon: Lips JJ    21:03 Other Flowsheet Documentation Other flowsheet entries - Vt Max: 1000 ml Ventilator Mode: Assist control Backup Mode Checked/Apnea: Yes Back-Up Rate: 14 Circuit Temperature: 98.6 °F (37 °C) Vt Exhaled (Machine Breath) (ml): 567 ml PEEP/VENT (cm H2O): 6 cm H20 Vt Set (ml): 550 ml PIP Observed (cm H2O): 24 cm H2O Plateau Pressure (cm H2O): 13 cm H2O Ve Observed (l/min): 7.93 l/min MAP (cm H2O): 9.5 Insp Flow (l/min): 65 l/min Pressure Max: 50 cm H2O Ve Min: 2 Ve Max: 20 Vt Min: 200 ml RR Min: 14 RR Max: 40 I:E Ratio: 1:3.66 JJ    21:03 Airway - Endotracheal Tube 08/24/18 Oral Placed Placement Date/Time: 08/24/18 2103 Number of Attempts: 1 Inserted By: Anastasiya Present on Admission/Arrival: Yes Location: Oral Placement Verified: Auscultation;EtCO2 Airway Types: Endotracheal, cuffed Airway Tube Size: 7.5 mm Anesthesia ETT . .. Nadyne Dust    21:03 Peripheral IV 08/24/18 Left Forearm Placed Placement Date/Time: 08/24/18 2103 Inserted By: EMS Present on Admission/Arrival: Yes Size: 20 G Orientation: Left Location: Forearm BE    21:02 Orders Acknowledged New - EKG 12 LEAD INITIAL BE    21:02 ED Notes Filed Pt intubated with 7.5 tube, 23 at the lips.   BE    21:01 Orders Acknowledged New - XR CHEST PA LAT VS    21:00 ED Notes Addendum Atomidate 20mg and Rian 100mg administered at this time by Zan Chen RN  BE    20:59 ED Notes Filed Dr. Carley Arambula at bedside to intubate pt. BE    20:58 ED Notes Filed Pt has crepitus to his upper extremities, face and chest.     Pt's face edematous and swollen. Pt unable to open eyes due to them being swollen shut. BE    20:54 VS-I/O-MAR Oxygen Therapy - FIO2 (%): 60 % JJ    20:54 Vital Signs Oxygen Therapy - FIO2 (%): 60 % JJ    20:54 Vital Signs Vital Signs - Level of Consciousness: Confused or Agitated Pulse (Heart Rate): 106 Resp Rate: 22 BP: 152/102 MAP (Calculated): 119 BP 1 Location: Left arm BP 1 Method: Automatic BP Patient Position: At rest  Oxygen Therapy - O2 Sat (%): 99 % O2 Device: BIPAP BE    20:53 ED Notes Addendum Assumed care of pt. From EMS. Pt. Presents to the ED with chief complaint of respiratory distress. EMS reports pt was found by his neighbors after they heard him yelling for help. EMS reports unknown information or history. EMS reports that pt was found by a bunch of beer outside. Pt sinus tach on the monitor. EMS gave 50mg Benadryl, 40mg Lasix, and 1 in Nitro paste to left chest.      Cardiac monitor x 3. Stretcher locked in the lowest position. DD    20:51 Chief Complaints Updated + Respiratory Distress (Pt presents to the ED via EMS for respiratory distress. EMS reports giving 50mg Benadryl and 40mg Lasix en route to the ED.  EMS placed 1 in nitro paste to left chest. )  BE    20:49 Arrival Complaint Resp Distress

## 2018-11-26 ENCOUNTER — HOSPITAL ENCOUNTER (INPATIENT)
Age: 63
LOS: 7 days | Discharge: NURSING, REHAB OR LONG-TERM CARE | DRG: 189 | End: 2018-12-03
Attending: EMERGENCY MEDICINE | Admitting: INTERNAL MEDICINE
Payer: MEDICARE

## 2018-11-26 ENCOUNTER — APPOINTMENT (OUTPATIENT)
Dept: GENERAL RADIOLOGY | Age: 63
DRG: 189 | End: 2018-11-26
Attending: EMERGENCY MEDICINE
Payer: MEDICARE

## 2018-11-26 DIAGNOSIS — Z71.89 ADVANCED DIRECTIVES, COUNSELING/DISCUSSION: ICD-10-CM

## 2018-11-26 DIAGNOSIS — J44.1 COPD EXACERBATION (HCC): ICD-10-CM

## 2018-11-26 DIAGNOSIS — J96.01 ACUTE RESPIRATORY FAILURE WITH HYPOXIA (HCC): Primary | ICD-10-CM

## 2018-11-26 PROBLEM — R06.03 RESPIRATORY DISTRESS: Status: ACTIVE | Noted: 2018-11-26

## 2018-11-26 PROBLEM — F41.1 ANXIETY STATE: Status: ACTIVE | Noted: 2018-11-26

## 2018-11-26 PROBLEM — Z72.0 TOBACCO ABUSE: Chronic | Status: ACTIVE | Noted: 2018-11-26

## 2018-11-26 PROBLEM — J43.9 PULMONARY EMPHYSEMA (HCC): Chronic | Status: ACTIVE | Noted: 2018-11-26

## 2018-11-26 PROBLEM — S22.43XG MULTIPLE CLOSED FRACTURES OF RIBS OF BOTH SIDES WITH DELAYED HEALING: Status: ACTIVE | Noted: 2018-11-26

## 2018-11-26 LAB
ALBUMIN SERPL-MCNC: 4 G/DL (ref 3.5–5)
ALBUMIN/GLOB SERPL: 1.1 {RATIO} (ref 1.1–2.2)
ALP SERPL-CCNC: 76 U/L (ref 45–117)
ALT SERPL-CCNC: 15 U/L (ref 12–78)
ANION GAP SERPL CALC-SCNC: 9 MMOL/L (ref 5–15)
ARTERIAL PATENCY WRIST A: YES
ARTERIAL PATENCY WRIST A: YES
AST SERPL-CCNC: 13 U/L (ref 15–37)
BASE DEFICIT BLDA-SCNC: 0.9 MMOL/L
BASE DEFICIT BLDA-SCNC: 3.1 MMOL/L
BASOPHILS # BLD: 0.1 K/UL (ref 0–0.1)
BASOPHILS NFR BLD: 1 % (ref 0–1)
BDY SITE: ABNORMAL
BDY SITE: NORMAL
BILIRUB SERPL-MCNC: 0.5 MG/DL (ref 0.2–1)
BNP SERPL-MCNC: 141 PG/ML (ref 0–125)
BREATHS.SPONTANEOUS ON VENT: 15
BUN SERPL-MCNC: 7 MG/DL (ref 6–20)
BUN/CREAT SERPL: 7 (ref 12–20)
CALCIUM SERPL-MCNC: 9.1 MG/DL (ref 8.5–10.1)
CHLORIDE SERPL-SCNC: 108 MMOL/L (ref 97–108)
CO2 SERPL-SCNC: 24 MMOL/L (ref 21–32)
COMMENT, HOLDF: NORMAL
CREAT SERPL-MCNC: 1.07 MG/DL (ref 0.7–1.3)
D DIMER PPP FEU-MCNC: 0.54 MG/L FEU (ref 0–0.65)
DIFFERENTIAL METHOD BLD: ABNORMAL
EOSINOPHIL # BLD: 1.5 K/UL (ref 0–0.4)
EOSINOPHIL NFR BLD: 17 % (ref 0–7)
EPAP/CPAP/PEEP, PAPEEP: 5
EPAP/CPAP/PEEP, PAPEEP: 5
ERYTHROCYTE [DISTWIDTH] IN BLOOD BY AUTOMATED COUNT: 12.4 % (ref 11.5–14.5)
ETHANOL SERPL-MCNC: <10 MG/DL
FIO2 ON VENT: 40 %
FIO2 ON VENT: 40 %
FLUAV AG NPH QL IA: NEGATIVE
FLUBV AG NOSE QL IA: NEGATIVE
GAS FLOW.O2 SETTING OXYMISER: 4 L/MIN
GLOBULIN SER CALC-MCNC: 3.8 G/DL (ref 2–4)
GLUCOSE SERPL-MCNC: 114 MG/DL (ref 65–100)
HCO3 BLDA-SCNC: 22 MMOL/L (ref 22–26)
HCO3 BLDA-SCNC: 25 MMOL/L (ref 22–26)
HCT VFR BLD AUTO: 45.3 % (ref 36.6–50.3)
HGB BLD-MCNC: 15.4 G/DL (ref 12.1–17)
IMM GRANULOCYTES # BLD: 0 K/UL (ref 0–0.04)
IMM GRANULOCYTES NFR BLD AUTO: 0 % (ref 0–0.5)
IPAP/PIP, IPAPIP: 10
IPAP/PIP, IPAPIP: 10
LACTATE SERPL-SCNC: 1 MMOL/L (ref 0.4–2)
LYMPHOCYTES # BLD: 3.7 K/UL (ref 0.8–3.5)
LYMPHOCYTES NFR BLD: 42 % (ref 12–49)
MCH RBC QN AUTO: 32.2 PG (ref 26–34)
MCHC RBC AUTO-ENTMCNC: 34 G/DL (ref 30–36.5)
MCV RBC AUTO: 94.6 FL (ref 80–99)
MONOCYTES # BLD: 0.5 K/UL (ref 0–1)
MONOCYTES NFR BLD: 5 % (ref 5–13)
NEUTS SEG # BLD: 3.2 K/UL (ref 1.8–8)
NEUTS SEG NFR BLD: 35 % (ref 32–75)
NRBC # BLD: 0 K/UL (ref 0–0.01)
NRBC BLD-RTO: 0 PER 100 WBC
PCO2 BLDA: 41 MMHG (ref 35–45)
PCO2 BLDA: 47 MMHG (ref 35–45)
PH BLDA: 7.35 [PH] (ref 7.35–7.45)
PH BLDA: 7.35 [PH] (ref 7.35–7.45)
PLATELET # BLD AUTO: 287 K/UL (ref 150–400)
PMV BLD AUTO: 9.7 FL (ref 8.9–12.9)
PO2 BLDA: 146 MMHG (ref 80–100)
PO2 BLDA: 93 MMHG (ref 80–100)
POTASSIUM SERPL-SCNC: 4.1 MMOL/L (ref 3.5–5.1)
PROT SERPL-MCNC: 7.8 G/DL (ref 6.4–8.2)
RBC # BLD AUTO: 4.79 M/UL (ref 4.1–5.7)
RBC MORPH BLD: ABNORMAL
SAMPLES BEING HELD,HOLD: NORMAL
SAO2 % BLD: 97 % (ref 92–97)
SAO2 % BLD: 99 % (ref 92–97)
SAO2% DEVICE SAO2% SENSOR NAME: ABNORMAL
SAO2% DEVICE SAO2% SENSOR NAME: NORMAL
SODIUM SERPL-SCNC: 141 MMOL/L (ref 136–145)
SPECIMEN SITE: ABNORMAL
SPECIMEN SITE: NORMAL
VENTILATION MODE VENT: ABNORMAL
WBC # BLD AUTO: 9 K/UL (ref 4.1–11.1)

## 2018-11-26 PROCEDURE — 94640 AIRWAY INHALATION TREATMENT: CPT

## 2018-11-26 PROCEDURE — 77030013033 HC MSK BPAP/CPAP MMKA -B

## 2018-11-26 PROCEDURE — 85025 COMPLETE CBC W/AUTO DIFF WBC: CPT

## 2018-11-26 PROCEDURE — 82803 BLOOD GASES ANY COMBINATION: CPT

## 2018-11-26 PROCEDURE — 71045 X-RAY EXAM CHEST 1 VIEW: CPT

## 2018-11-26 PROCEDURE — 65270000029 HC RM PRIVATE

## 2018-11-26 PROCEDURE — 80307 DRUG TEST PRSMV CHEM ANLYZR: CPT

## 2018-11-26 PROCEDURE — 96374 THER/PROPH/DIAG INJ IV PUSH: CPT

## 2018-11-26 PROCEDURE — 94660 CPAP INITIATION&MGMT: CPT

## 2018-11-26 PROCEDURE — 87804 INFLUENZA ASSAY W/OPTIC: CPT

## 2018-11-26 PROCEDURE — 74011250636 HC RX REV CODE- 250/636: Performed by: EMERGENCY MEDICINE

## 2018-11-26 PROCEDURE — 36415 COLL VENOUS BLD VENIPUNCTURE: CPT

## 2018-11-26 PROCEDURE — 36600 WITHDRAWAL OF ARTERIAL BLOOD: CPT

## 2018-11-26 PROCEDURE — 74011250636 HC RX REV CODE- 250/636: Performed by: INTERNAL MEDICINE

## 2018-11-26 PROCEDURE — 85379 FIBRIN DEGRADATION QUANT: CPT

## 2018-11-26 PROCEDURE — 94761 N-INVAS EAR/PLS OXIMETRY MLT: CPT

## 2018-11-26 PROCEDURE — 74011000250 HC RX REV CODE- 250: Performed by: INTERNAL MEDICINE

## 2018-11-26 PROCEDURE — 83880 ASSAY OF NATRIURETIC PEPTIDE: CPT

## 2018-11-26 PROCEDURE — 83605 ASSAY OF LACTIC ACID: CPT

## 2018-11-26 PROCEDURE — 80053 COMPREHEN METABOLIC PANEL: CPT

## 2018-11-26 PROCEDURE — 99285 EMERGENCY DEPT VISIT HI MDM: CPT

## 2018-11-26 PROCEDURE — 74011000250 HC RX REV CODE- 250: Performed by: EMERGENCY MEDICINE

## 2018-11-26 PROCEDURE — 74011250637 HC RX REV CODE- 250/637: Performed by: INTERNAL MEDICINE

## 2018-11-26 RX ORDER — ENOXAPARIN SODIUM 100 MG/ML
40 INJECTION SUBCUTANEOUS EVERY 24 HOURS
Status: DISCONTINUED | OUTPATIENT
Start: 2018-11-26 | End: 2018-12-03 | Stop reason: HOSPADM

## 2018-11-26 RX ORDER — SODIUM CHLORIDE 9 MG/ML
50 INJECTION, SOLUTION INTRAVENOUS CONTINUOUS
Status: DISCONTINUED | OUTPATIENT
Start: 2018-11-26 | End: 2018-11-28

## 2018-11-26 RX ORDER — IPRATROPIUM BROMIDE AND ALBUTEROL SULFATE 2.5; .5 MG/3ML; MG/3ML
3 SOLUTION RESPIRATORY (INHALATION)
Status: DISCONTINUED | OUTPATIENT
Start: 2018-11-26 | End: 2018-11-28

## 2018-11-26 RX ORDER — HYDROCODONE POLISTIREX AND CHLORPHENIRAMINE POLISTIREX 10; 8 MG/5ML; MG/5ML
5 SUSPENSION, EXTENDED RELEASE ORAL EVERY 12 HOURS
Status: DISCONTINUED | OUTPATIENT
Start: 2018-11-26 | End: 2018-11-28

## 2018-11-26 RX ORDER — LORAZEPAM 2 MG/ML
0.5 INJECTION INTRAMUSCULAR ONCE
Status: COMPLETED | OUTPATIENT
Start: 2018-11-26 | End: 2018-11-26

## 2018-11-26 RX ORDER — MORPHINE SULFATE 2 MG/ML
1 INJECTION, SOLUTION INTRAMUSCULAR; INTRAVENOUS
Status: DISCONTINUED | OUTPATIENT
Start: 2018-11-26 | End: 2018-12-03 | Stop reason: HOSPADM

## 2018-11-26 RX ORDER — SODIUM CHLORIDE 0.9 % (FLUSH) 0.9 %
5-10 SYRINGE (ML) INJECTION EVERY 8 HOURS
Status: DISCONTINUED | OUTPATIENT
Start: 2018-11-26 | End: 2018-12-03 | Stop reason: HOSPADM

## 2018-11-26 RX ORDER — SODIUM CHLORIDE 0.9 % (FLUSH) 0.9 %
5-10 SYRINGE (ML) INJECTION AS NEEDED
Status: DISCONTINUED | OUTPATIENT
Start: 2018-11-26 | End: 2018-12-03 | Stop reason: HOSPADM

## 2018-11-26 RX ORDER — GUAIFENESIN 600 MG/1
600 TABLET, EXTENDED RELEASE ORAL EVERY 12 HOURS
Status: DISCONTINUED | OUTPATIENT
Start: 2018-11-26 | End: 2018-12-03 | Stop reason: HOSPADM

## 2018-11-26 RX ORDER — IPRATROPIUM BROMIDE AND ALBUTEROL SULFATE 2.5; .5 MG/3ML; MG/3ML
3 SOLUTION RESPIRATORY (INHALATION)
Status: COMPLETED | OUTPATIENT
Start: 2018-11-26 | End: 2018-11-26

## 2018-11-26 RX ORDER — ONDANSETRON 2 MG/ML
4 INJECTION INTRAMUSCULAR; INTRAVENOUS
Status: DISCONTINUED | OUTPATIENT
Start: 2018-11-26 | End: 2018-12-03 | Stop reason: HOSPADM

## 2018-11-26 RX ORDER — GUAIFENESIN/DEXTROMETHORPHAN 100-10MG/5
10 SYRUP ORAL
Status: DISCONTINUED | OUTPATIENT
Start: 2018-11-26 | End: 2018-12-03 | Stop reason: HOSPADM

## 2018-11-26 RX ADMIN — LORAZEPAM 0.5 MG: 2 INJECTION INTRAMUSCULAR; INTRAVENOUS at 17:28

## 2018-11-26 RX ADMIN — SODIUM CHLORIDE 75 ML/HR: 900 INJECTION, SOLUTION INTRAVENOUS at 18:39

## 2018-11-26 RX ADMIN — HYDROCODONE POLISTIREX AND CHLORPHENIRAMINE POLISTIREX 5 ML: 10; 8 SUSPENSION, EXTENDED RELEASE ORAL at 17:28

## 2018-11-26 RX ADMIN — IPRATROPIUM BROMIDE AND ALBUTEROL SULFATE 3 ML: .5; 3 SOLUTION RESPIRATORY (INHALATION) at 14:38

## 2018-11-26 RX ADMIN — FAMOTIDINE 20 MG: 10 INJECTION, SOLUTION INTRAVENOUS at 22:14

## 2018-11-26 RX ADMIN — Medication 10 ML: at 18:27

## 2018-11-26 RX ADMIN — GUAIFENESIN 600 MG: 600 TABLET, EXTENDED RELEASE ORAL at 22:05

## 2018-11-26 RX ADMIN — METHYLPREDNISOLONE SODIUM SUCCINATE 125 MG: 125 INJECTION, POWDER, FOR SOLUTION INTRAMUSCULAR; INTRAVENOUS at 18:25

## 2018-11-26 RX ADMIN — IPRATROPIUM BROMIDE AND ALBUTEROL SULFATE 3 ML: .5; 3 SOLUTION RESPIRATORY (INHALATION) at 17:27

## 2018-11-26 RX ADMIN — IPRATROPIUM BROMIDE AND ALBUTEROL SULFATE 3 ML: .5; 3 SOLUTION RESPIRATORY (INHALATION) at 23:47

## 2018-11-26 RX ADMIN — IPRATROPIUM BROMIDE AND ALBUTEROL SULFATE 3 ML: .5; 3 SOLUTION RESPIRATORY (INHALATION) at 19:28

## 2018-11-26 RX ADMIN — ENOXAPARIN SODIUM 40 MG: 40 INJECTION, SOLUTION INTRAVENOUS; SUBCUTANEOUS at 22:03

## 2018-11-26 RX ADMIN — METHYLPREDNISOLONE SODIUM SUCCINATE 125 MG: 125 INJECTION, POWDER, FOR SOLUTION INTRAMUSCULAR; INTRAVENOUS at 14:35

## 2018-11-26 NOTE — ED NOTES
Pt arrives via EMS from home. EMS reports call for SOB with known hx of COPD Pt not oxygen dependent at home but does smoke daily. Pt given neb during transport IV established during transport L AC Pt arrives alert anxious stating \"I can't breathe. I'm going to die. \" EMS and this RN provided reassurance Dr Izabel Caban bedside upon arrival and orders obtained for BiPAP.

## 2018-11-26 NOTE — ED NOTES
Patient removed from bipap and attempted to ambulate on room air while monitoring pulse ox. Patient was able to sit on side of bed, becoming out of breath. Pulse ox remained at 92%. Upon standing, patient was experiencing inspiratory and expiratory wheezing, extreme SOB, pulse ox of 89%. Patient states \"I cant do this. I cant breathe. \" Patient placed back in bed and back on bipap per MD.

## 2018-11-26 NOTE — ED PROVIDER NOTES
EMERGENCY DEPARTMENT HISTORY AND PHYSICAL EXAM 
 
 
Date: 11/26/2018 Patient Name: Maricarmen Conway History of Presenting Illness Chief Complaint Patient presents with  Respiratory Distress Arrives via EMS for SOB Pt with hx of emphysema History Provided By: Patient HPI: Maricarmen Conway, 70 y.o. male with PMHx significant for COPD, asthma, and tobacco use, presents via EMS to the ED with cc of ongoing SOB since yesterday, but worsening significantly today. Per EMS, upon arrival pt's oxygen saturation was 73% on RA. He smokes 1.5 packs of cigarettes every other day. Pt is not oxygen dependent at baseline. Per EMS, pt was given 1 duoneb with some improvement in sx's, however when transferring from the EMS stretcher to the ED bed pt became more dyspneac and labored. History of present illness limited secondary to respiratory distress. PCP: Yared Encinas MD 
 
Current Outpatient Medications Medication Sig Dispense Refill  albuterol (PROVENTIL HFA, VENTOLIN HFA, PROAIR HFA) 90 mcg/actuation inhaler Take 2 Puffs by inhalation every four (4) hours as needed for Wheezing. 1 Inhaler 0  
 albuterol (ACCUNEB) 1.25 mg/3 mL nebu Take 3 mL by inhalation every four (4) hours as needed (wheezing). 25 Each 0 Past History Past Medical History: 
Past Medical History:  
Diagnosis Date  Asthma  COPD (chronic obstructive pulmonary disease) (HCC) Past Surgical History: 
History reviewed. No pertinent surgical history. Family History: 
History reviewed. No pertinent family history. Social History: 
Social History Tobacco Use  Smoking status: Current Every Day Smoker Packs/day: 0.50  Smokeless tobacco: Never Used Substance Use Topics  Alcohol use: Yes Comment: 2 bottles of wine x 40 years, 24 beers per day  Drug use: No  
 
 
Allergies: 
No Known Allergies Review of Systems Review of Systems Unable to perform ROS: Severe respiratory distress Physical Exam  
Physical Exam  
Constitutional: He is oriented to person, place, and time. He appears well-developed. He appears cachectic. Thin male HENT:  
Head: Normocephalic and atraumatic. Eyes: Conjunctivae and EOM are normal.  
Neck: Normal range of motion. Neck supple. Cardiovascular: Normal rate and regular rhythm. Pulmonary/Chest: Accessory muscle usage present. Tachypnea noted. He is in respiratory distress. Pt's breathing is labored. He is unable to complete full sentences. Poor air movement and expiratory squeaking wheezes. Abdominal: Soft. He exhibits no distension. There is no tenderness. Musculoskeletal: Normal range of motion. Neurological: He is alert and oriented to person, place, and time. Skin: Skin is warm and dry. Psychiatric: His mood appears anxious. Nursing note and vitals reviewed. Diagnostic Study Results Labs - Recent Results (from the past 12 hour(s)) CBC WITH AUTOMATED DIFF Collection Time: 11/26/18  2:31 PM  
Result Value Ref Range WBC 9.0 4.1 - 11.1 K/uL  
 RBC 4.79 4.10 - 5.70 M/uL  
 HGB 15.4 12.1 - 17.0 g/dL HCT 45.3 36.6 - 50.3 % MCV 94.6 80.0 - 99.0 FL  
 MCH 32.2 26.0 - 34.0 PG  
 MCHC 34.0 30.0 - 36.5 g/dL  
 RDW 12.4 11.5 - 14.5 % PLATELET 452 580 - 437 K/uL MPV 9.7 8.9 - 12.9 FL  
 NRBC 0.0 0  WBC ABSOLUTE NRBC 0.00 0.00 - 0.01 K/uL NEUTROPHILS PENDING % LYMPHOCYTES PENDING % MONOCYTES PENDING % EOSINOPHILS PENDING % BASOPHILS PENDING % IMMATURE GRANULOCYTES PENDING %  
 ABS. NEUTROPHILS PENDING K/UL  
 ABS. LYMPHOCYTES PENDING K/UL  
 ABS. MONOCYTES PENDING K/UL  
 ABS. EOSINOPHILS PENDING K/UL  
 ABS. BASOPHILS PENDING K/UL  
 ABS. IMM. GRANS. PENDING K/UL  
 DF PENDING   
METABOLIC PANEL, COMPREHENSIVE Collection Time: 11/26/18  2:31 PM  
Result Value Ref Range Sodium 141 136 - 145 mmol/L  Potassium 4.1 3.5 - 5.1 mmol/L  
 Chloride 108 97 - 108 mmol/L  
 CO2 24 21 - 32 mmol/L Anion gap 9 5 - 15 mmol/L Glucose 114 (H) 65 - 100 mg/dL BUN 7 6 - 20 MG/DL Creatinine 1.07 0.70 - 1.30 MG/DL  
 BUN/Creatinine ratio 7 (L) 12 - 20 GFR est AA >60 >60 ml/min/1.73m2 GFR est non-AA >60 >60 ml/min/1.73m2 Calcium 9.1 8.5 - 10.1 MG/DL Bilirubin, total 0.5 0.2 - 1.0 MG/DL  
 ALT (SGPT) 15 12 - 78 U/L  
 AST (SGOT) 13 (L) 15 - 37 U/L Alk. phosphatase 76 45 - 117 U/L Protein, total 7.8 6.4 - 8.2 g/dL Albumin 4.0 3.5 - 5.0 g/dL Globulin 3.8 2.0 - 4.0 g/dL A-G Ratio 1.1 1.1 - 2.2 SAMPLES BEING HELD Collection Time: 11/26/18  2:31 PM  
Result Value Ref Range SAMPLES BEING HELD RED,BLUE   
 COMMENT Add-on orders for these samples will be processed based on acceptable specimen integrity and analyte stability, which may vary by analyte. BLOOD GAS, ARTERIAL Collection Time: 11/26/18  2:52 PM  
Result Value Ref Range pH 7.35 7.35 - 7.45    
 PCO2 47 (H) 35.0 - 45.0 mmHg PO2 146 (H) 80 - 100 mmHg O2 SAT 99 (H) 92 - 97 % BICARBONATE 25 22 - 26 mmol/L  
 BASE DEFICIT 0.9 mmol/L  
 O2 METHOD BIPAP    
 FIO2 40 % MODE BIPAP    
 IPAP/PIP 10.0 EPAP/CPAP/PEEP 5.0 Sample source ARTERIAL    
 SITE LEFT RADIAL ZEKE'S TEST YES Radiologic Studies - CXR Results  (Last 48 hours)  
          
 11/26/18 1503  XR CHEST PORT Final result Impression:  IMPRESSION:   
1. No acute cardiopulmonary process. 2. Chronic emphysematous changes. 3. Subacute to chronic bilateral rib fractures. Narrative:  EXAM:  XR CHEST PORT INDICATION:   SOB  
   
COMPARISON: Chest radiograph 8/24/2018. FINDINGS: AP radiograph of the chest was obtained. Hyperexpanded lungs are again noted. No evidence of focal consolidation. No  
pleural effusion or pneumothorax.   Heart, burton, mediastinum are within normal  
 limits. No acute osseous abnormalities. Subacute to chronic bilateral rib  
fractures. Medical Decision Making I am the first provider for this patient. I reviewed the vital signs, available nursing notes, past medical history, past surgical history, family history and social history. Vital Signs-Reviewed the patient's vital signs. Patient Vitals for the past 12 hrs: 
 Temp Pulse Resp SpO2  
11/26/18 1438    99 % 11/26/18 1429 97.5 °F (36.4 °C) (!) 116 (!) 36 94 % Pulse Oximetry Analysis - 99% on BIPAP Cardiac Monitor:  
Rate: 116 bpm 
Rhythm: Sinus Tachycardia Records Reviewed: Nursing Notes, Old Medical Records and Ambulance Run Sheet Provider Notes (Medical Decision Making):  
Patient presents with SOB. DDx: COPD/Asthma exacerbation, Bronchitis, CHF exacerbation, ACS, PE, PNA, PTX, Anxiety. Will get labs and cxr and ekg. Will put pt on BIPAP. ED Course:  
Initial assessment performed. The patients presenting problems have been discussed, and they are in agreement with the care plan formulated and outlined with them. I have encouraged them to ask questions as they arise throughout their visit. CONSULT NOTE:  
4:02 PM 
Katina Long M.D spoke with Barbara Parkerr, MD  
Specialty: Hospitalist 
Discussed pt's hx, disposition, and available diagnostic and imaging results. Reviewed care plans. Consultant will evaluate pt for admission. Written by Charmian Mcardle, ED Scribe, as dictated by Katina Long M.D. PROGRESS NOTE: 
4:10 PM 
Took pt off BIPAP. He is now able to speak in full sentences, however continues to have significant wheezing b/l. Will watch oxygen saturations and then ambulate. Written by Charmian Mcardle, ED Scribe, as dictated by Katina Long M.D PROGRESS NOTE: 
4:15 PM 
Pt sat upright in bed and oxygen saturation remained 98%.  Pt then stood up and oxygen saturations remained 95%, however pt began coughing significantly with labored breathing. Pt unable to ambulate will place back on BIPAP. Written by Álvaro Tolbert ED Scribe, as dictated by Brandan Carter M.D. CRITICAL CARE NOTE : 
 
2:33 PM 
 
IMPENDING DETERIORATION -Airway and Respiratory ASSOCIATED RISK FACTORS - Hypoxia MANAGEMENT- Bedside Assessment and Supervision of Care INTERPRETATION -  Xrays, Blood Gases, ECG and Blood Pressure INTERVENTIONS - hemodynamic mngmt and vent mngmt CASE REVIEW - Hospitalist and Nursing TREATMENT RESPONSE -Stable PERFORMED BY - Self NOTES   : 
 
I have spent 40 minutes of critical care time involved in lab review, consultations with specialist, family decision- making, bedside attention and documentation. During this entire length of time I was immediately available to the patient. Disposition: 
ADMIT NOTE: 
4:03 PM 
The patient is being admitted to the hospital by Vin Gutiérrez MD.  The results of their tests and reasons for their admission have been discussed with the patient and/or available family. They convey agreement and understanding for the need to be admitted and for their admission diagnosis. PLAN: 
1. Admit to hospitalist   
 
Diagnosis Clinical Impression: 1. Acute respiratory failure with hypoxia ([address] 2. COPD exacerbation ([address] Attestations: This note is prepared by Álvaro Tolbert, acting as Scribe for Brandan Carter M.D. Brandan Carter M.D: The scribe's documentation has been prepared under my direction and personally reviewed by me in its entirety. I confirm that the note above accurately reflects all work, treatment, procedures, and medical decision making performed by me.

## 2018-11-26 NOTE — ED NOTES
Bedside shift change report given to [address] [place] [address]  (oncoming nurse) by Lisa Scales RN (offgoing nurse). Report included the following information SBAR.

## 2018-11-26 NOTE — ED NOTES
Bedside shift change report given to Elizabeth Campo RN (oncoming nurse) by Edward Mayorga RN (offgoing nurse). Report included the following information SBAR, Kardex, ED Summary, Intake/Output, MAR and Recent Results.

## 2018-11-26 NOTE — H&P
Hospitalist Admission NoteNAME: Francesca Maldonado :  1955 MRN:  [id number] Date/Time:  2018 4:57 PM 
 
Patient PCP: Jermaine Alonzo MD at [place] 
______________________________________________________________________ Given the patient's current clinical presentation, I have a high level of concern for decompensation if discharged from the emergency department. Complex decision making was performed, which includes reviewing the patient's available past medical records, laboratory results, and x-ray films. My assessment of this patient's clinical condition and my plan of care is as follows. Assessment / Plan: 
Acute Respiratory Failure with Hypoxia POA Likely due to Acute COPD exacerbation POA Likely due to Acute Asthmatic Bronchitis POA- ? Viral etiology R/o PE 
H/o Chronic Emphysema from Tobacco Abuse H/o Recent Falls with subsequent Bilateral Rib Fractures- Subacute CXR no acute ASD, Chronic emphysema noted, Subacute bilateral Rib Fractures noted Initial ABG= 1.35/47/146/Bicarb 25, 99% on 40% FIO2 on BIPAP Admit to stepdown bed for close monitoring- pt at a high risk for further deterioration (critically ill)- may need intubation if not improved on BIPAP in next few hrs due to Refractory Coughing & Work of breathing with Splinting from recent  Subacute Rib fractures BIPAP support for now IV solumedrol Scheduled nebs Empiric Azithromycin IV hydration Mucinex BID Tussionex BID + Robitussin DM prn for refractory cough Repeat ABG later this evening Check DDimer to R/o PE (pre test probability is moderate) Check lactic acid Check Rapid Flu test to rule out flu Smoking cessation counseling given in ER 
IV ativan x 1 for the anxiety state Pain management to prevent splinting from Rib fracture pain Code Status: Full Surrogate Decision Maker: daughter Lauren Hutchins DVT Prophylaxis: SQ lovenox GI Prophylaxis: IV pepcid for now Baseline: Pt is independent with ADLs at baseline, not on oxygen at home, is a smoker but is cutting down on his cigarettes & has been down 1 pack every 3 days now Subjective: CHIEF COMPLAINT: Worsening SOB x 1-2 weeks HISTORY OF PRESENT ILLNESS:    
Miguel Gomez is a 70 y.o.  male who presents with above complains from home via EMS. Pt presents with CC of worsening SOB x 1 week H/o associated with cough with minimal sputum H/o chest pain - more so splinting from the pain on coughing due to recent Rib fractures s/p fall H/o smoking all his life, has been working on cutting down on smokes- now 1 pack every 3 days Denies any fever, chills Pt was found to be severely hypoxic & increased work of breathing by EMS- was put on BIPAP in ER- improved but was unable to be weaned off it to NC- back on BIPAP now We were asked to admit for work up and evaluation of the above problems. Past Medical History:  
Diagnosis Date  Asthma  COPD (chronic obstructive pulmonary disease) (HCC) History reviewed. No pertinent surgical history. Social History Tobacco Use  Smoking status: Current Every Day Smoker Packs/day: 0.50  Smokeless tobacco: Never Used Substance Use Topics  Alcohol use: Yes Comment: 2 bottles of wine x 40 years, 24 beers per day Family History Denies h/o COPD/Lung Cancer, DM, heart disease No Known Allergies Prior to Admission medications Medication Sig Start Date End Date Taking? Authorizing Provider  
albuterol (PROVENTIL HFA, VENTOLIN HFA, PROAIR HFA) 90 mcg/actuation inhaler Take 2 Puffs by inhalation every four (4) hours as needed for Wheezing. 1/19/18  Yes Janet Berg MD  
albuterol (ACCUNEB) 1.25 mg/3 mL nebu Take 3 mL by inhalation every four (4) hours as needed (wheezing). 1/19/18  Yes Janet Berg MD  
 
 
REVIEW OF SYSTEMS:    
 
 
 
Total of 12 systems reviewed as follows: POSITIVE= underlined text  Negative = text not underlined General:  fever, chills, sweats, generalized weakness, weight loss/gain,  
   loss of appetite Eyes:    blurred vision, eye pain, loss of vision, double vision ENT:    rhinorrhea, pharyngitis Respiratory:   cough, sputum production, SOB, KO, wheezing, pleuritic pain  
Cardiology:   chest pain, palpitations, orthopnea, PND, edema, syncope Gastrointestinal:  abdominal pain , N/V, diarrhea, dysphagia, constipation, bleeding Genitourinary:  frequency, urgency, dysuria, hematuria, incontinence Muskuloskeletal :  arthralgia, myalgia, back pain Hematology:  easy bruising, nose or gum bleeding, lymphadenopathy Dermatological: rash, ulceration, pruritis, color change / jaundice Endocrine:   hot flashes or polydipsia Neurological:  headache, dizziness, confusion, focal weakness, paresthesia, Speech difficulties, memory loss, gait difficulty Psychological: Feelings of anxiety, depression, agitation Objective: VITALS:   
Visit Vitals /70 Pulse 87 Temp 97.5 °F (36.4 °C) Resp 21 SpO2 99% PHYSICAL EXAM: 
 
General:    Alert, cooperative, moderate resp distress noted +, appears stated age. HEENT: Atraumatic, anicteric sclerae, pink conjunctivae, using accessory muscle of respirations when off BIPAP No oral ulcers, mucosa moist, throat clear, dentition fair Neck:  Supple, symmetrical,  thyroid: non tender Lungs: Tachypnea, Wheezing bilaterally +, No rales. Chest wall:  No tenderness  No Accessory muscle use. Heart:   Tachycardia noted +, Regular  rhythm,  No  murmur   No edema Abdomen:   Soft, non-tender. Not distended. Bowel sounds normal 
Extremities: No cyanosis. No clubbing,   
  Skin turgor normal, Capillary refill normal, Radial dial pulse 2+ Skin:     Not pale. Not Jaundiced  No rashes Psych:  Good insight. Not depressed. Moderately anxious + Neurologic: EOMs intact. No facial asymmetry. No aphasia or slurred speech. Symmetrical strength, Sensation grossly intact. Alert and oriented X 4.  
 
_______________________________________________________________________ Care Plan discussed with: 
  Comments Patient x Family RN x Care Manager Consultant:  rina Ponce   
_______________________________________________________________________ Expected  Disposition:  
Home with Family x HH/PT/OT/RN ?  
SNF/LTC   
[facility]   
________________________________________________________________________ TOTAL TIME:  71 Minutes Critical Care Provided   71  Minutes non procedure based Comments  
 x Reviewed previous records  
>50% of visit spent in counseling and coordination of care x Discussion with patient family and questions answered 
  
 
________________________________________________________________________ Signed: Lucrecia Bower MD 
 
Procedures: see electronic medical records for all procedures/Xrays and details which were not copied into this note but were reviewed prior to creation of Plan. LAB DATA REVIEWED:   
Recent Results (from the past 24 hour(s)) CBC WITH AUTOMATED DIFF Collection Time: 11/26/18  2:31 PM  
Result Value Ref Range WBC 9.0 4.1 - 11.1 K/uL  
 RBC 4.79 4.10 - 5.70 M/uL  
 HGB 15.4 12.1 - 17.0 g/dL HCT 45.3 36.6 - 50.3 % MCV 94.6 80.0 - 99.0 FL  
 MCH 32.2 26.0 - 34.0 PG  
 MCHC 34.0 30.0 - 36.5 g/dL  
 RDW 12.4 11.5 - 14.5 % PLATELET 526 135 - 903 K/uL MPV 9.7 8.9 - 12.9 FL  
 NRBC 0.0 0  WBC ABSOLUTE NRBC 0.00 0.00 - 0.01 K/uL NEUTROPHILS 35 32 - 75 % LYMPHOCYTES 42 12 - 49 % MONOCYTES 5 5 - 13 % EOSINOPHILS 17 (H) 0 - 7 % BASOPHILS 1 0 - 1 % IMMATURE GRANULOCYTES 0 0.0 - 0.5 % ABS. NEUTROPHILS 3.2 1.8 - 8.0 K/UL  
 ABS. LYMPHOCYTES 3.7 (H) 0.8 - 3.5 K/UL  
 ABS. MONOCYTES 0.5 0.0 - 1.0 K/UL  
 ABS. EOSINOPHILS 1.5 (H) 0.0 - 0.4 K/UL ABS. BASOPHILS 0.1 0.0 - 0.1 K/UL  
 ABS. IMM. GRANS. 0.0 0.00 - 0.04 K/UL  
 DF AUTOMATED    
 RBC COMMENTS NORMOCYTIC, NORMOCHROMIC METABOLIC PANEL, COMPREHENSIVE Collection Time: 11/26/18  2:31 PM  
Result Value Ref Range Sodium 141 136 - 145 mmol/L Potassium 4.1 3.5 - 5.1 mmol/L Chloride 108 97 - 108 mmol/L  
 CO2 24 21 - 32 mmol/L Anion gap 9 5 - 15 mmol/L Glucose 114 (H) 65 - 100 mg/dL BUN 7 6 - 20 MG/DL Creatinine 1.07 0.70 - 1.30 MG/DL  
 BUN/Creatinine ratio 7 (L) 12 - 20 GFR est AA >60 >60 ml/min/1.73m2 GFR est non-AA >60 >60 ml/min/1.73m2 Calcium 9.1 8.5 - 10.1 MG/DL Bilirubin, total 0.5 0.2 - 1.0 MG/DL  
 ALT (SGPT) 15 12 - 78 U/L  
 AST (SGOT) 13 (L) 15 - 37 U/L Alk. phosphatase 76 45 - 117 U/L Protein, total 7.8 6.4 - 8.2 g/dL Albumin 4.0 3.5 - 5.0 g/dL Globulin 3.8 2.0 - 4.0 g/dL A-G Ratio 1.1 1.1 - 2.2 SAMPLES BEING HELD Collection Time: 11/26/18  2:31 PM  
Result Value Ref Range SAMPLES BEING HELD RED,BLUE   
 COMMENT Add-on orders for these samples will be processed based on acceptable specimen integrity and analyte stability, which may vary by analyte. NT-PRO BNP Collection Time: 11/26/18  2:31 PM  
Result Value Ref Range NT pro- (H) 0 - 125 PG/ML  
BLOOD GAS, ARTERIAL Collection Time: 11/26/18  2:52 PM  
Result Value Ref Range pH 7.35 7.35 - 7.45    
 PCO2 47 (H) 35.0 - 45.0 mmHg PO2 146 (H) 80 - 100 mmHg O2 SAT 99 (H) 92 - 97 % BICARBONATE 25 22 - 26 mmol/L  
 BASE DEFICIT 0.9 mmol/L  
 O2 METHOD BIPAP    
 FIO2 40 % MODE BIPAP    
 IPAP/PIP 10.0 EPAP/CPAP/PEEP 5.0 Sample source ARTERIAL    
 SITE LEFT RADIAL  ZEKE'S TEST YES

## 2018-11-27 LAB
ANION GAP SERPL CALC-SCNC: 5 MMOL/L (ref 5–15)
BUN SERPL-MCNC: 8 MG/DL (ref 6–20)
BUN/CREAT SERPL: 8 (ref 12–20)
CALCIUM SERPL-MCNC: 8.8 MG/DL (ref 8.5–10.1)
CHLORIDE SERPL-SCNC: 107 MMOL/L (ref 97–108)
CO2 SERPL-SCNC: 26 MMOL/L (ref 21–32)
CREAT SERPL-MCNC: 1.01 MG/DL (ref 0.7–1.3)
ERYTHROCYTE [DISTWIDTH] IN BLOOD BY AUTOMATED COUNT: 12.2 % (ref 11.5–14.5)
GLUCOSE SERPL-MCNC: 152 MG/DL (ref 65–100)
HCT VFR BLD AUTO: 41.5 % (ref 36.6–50.3)
HGB BLD-MCNC: 14.1 G/DL (ref 12.1–17)
MCH RBC QN AUTO: 32.2 PG (ref 26–34)
MCHC RBC AUTO-ENTMCNC: 34 G/DL (ref 30–36.5)
MCV RBC AUTO: 94.7 FL (ref 80–99)
NRBC # BLD: 0 K/UL (ref 0–0.01)
NRBC BLD-RTO: 0 PER 100 WBC
PLATELET # BLD AUTO: 223 K/UL (ref 150–400)
PMV BLD AUTO: 9.6 FL (ref 8.9–12.9)
POTASSIUM SERPL-SCNC: 4.2 MMOL/L (ref 3.5–5.1)
RBC # BLD AUTO: 4.38 M/UL (ref 4.1–5.7)
SODIUM SERPL-SCNC: 138 MMOL/L (ref 136–145)
WBC # BLD AUTO: 4.6 K/UL (ref 4.1–11.1)

## 2018-11-27 PROCEDURE — 77010033678 HC OXYGEN DAILY

## 2018-11-27 PROCEDURE — 74011250636 HC RX REV CODE- 250/636: Performed by: INTERNAL MEDICINE

## 2018-11-27 PROCEDURE — 94660 CPAP INITIATION&MGMT: CPT

## 2018-11-27 PROCEDURE — 94640 AIRWAY INHALATION TREATMENT: CPT

## 2018-11-27 PROCEDURE — 36415 COLL VENOUS BLD VENIPUNCTURE: CPT

## 2018-11-27 PROCEDURE — 65660000000 HC RM CCU STEPDOWN

## 2018-11-27 PROCEDURE — 85027 COMPLETE CBC AUTOMATED: CPT

## 2018-11-27 PROCEDURE — 74011000250 HC RX REV CODE- 250: Performed by: INTERNAL MEDICINE

## 2018-11-27 PROCEDURE — 74011250637 HC RX REV CODE- 250/637: Performed by: INTERNAL MEDICINE

## 2018-11-27 PROCEDURE — 80048 BASIC METABOLIC PNL TOTAL CA: CPT

## 2018-11-27 RX ORDER — ACETAMINOPHEN 325 MG/1
650 TABLET ORAL
Status: DISCONTINUED | OUTPATIENT
Start: 2018-11-27 | End: 2018-12-03 | Stop reason: HOSPADM

## 2018-11-27 RX ORDER — HALOPERIDOL 5 MG/ML
5 INJECTION INTRAMUSCULAR
Status: DISCONTINUED | OUTPATIENT
Start: 2018-11-27 | End: 2018-12-03 | Stop reason: HOSPADM

## 2018-11-27 RX ORDER — LANOLIN ALCOHOL/MO/W.PET/CERES
3 CREAM (GRAM) TOPICAL
Status: DISCONTINUED | OUTPATIENT
Start: 2018-11-28 | End: 2018-12-03 | Stop reason: HOSPADM

## 2018-11-27 RX ADMIN — Medication 5 ML: at 06:06

## 2018-11-27 RX ADMIN — HYDROCODONE POLISTIREX AND CHLORPHENIRAMINE POLISTIREX 5 ML: 10; 8 SUSPENSION, EXTENDED RELEASE ORAL at 08:19

## 2018-11-27 RX ADMIN — METHYLPREDNISOLONE SODIUM SUCCINATE 125 MG: 125 INJECTION, POWDER, FOR SOLUTION INTRAMUSCULAR; INTRAVENOUS at 12:21

## 2018-11-27 RX ADMIN — GUAIFENESIN 600 MG: 600 TABLET, EXTENDED RELEASE ORAL at 20:56

## 2018-11-27 RX ADMIN — SODIUM CHLORIDE 75 ML/HR: 900 INJECTION, SOLUTION INTRAVENOUS at 08:23

## 2018-11-27 RX ADMIN — METHYLPREDNISOLONE SODIUM SUCCINATE 125 MG: 125 INJECTION, POWDER, FOR SOLUTION INTRAMUSCULAR; INTRAVENOUS at 00:35

## 2018-11-27 RX ADMIN — IPRATROPIUM BROMIDE AND ALBUTEROL SULFATE 3 ML: .5; 3 SOLUTION RESPIRATORY (INHALATION) at 08:22

## 2018-11-27 RX ADMIN — FAMOTIDINE 20 MG: 10 INJECTION, SOLUTION INTRAVENOUS at 08:20

## 2018-11-27 RX ADMIN — FAMOTIDINE 20 MG: 10 INJECTION, SOLUTION INTRAVENOUS at 20:56

## 2018-11-27 RX ADMIN — IPRATROPIUM BROMIDE AND ALBUTEROL SULFATE 3 ML: .5; 3 SOLUTION RESPIRATORY (INHALATION) at 19:32

## 2018-11-27 RX ADMIN — METHYLPREDNISOLONE SODIUM SUCCINATE 60 MG: 125 INJECTION, POWDER, FOR SOLUTION INTRAMUSCULAR; INTRAVENOUS at 18:30

## 2018-11-27 RX ADMIN — METHYLPREDNISOLONE SODIUM SUCCINATE 125 MG: 125 INJECTION, POWDER, FOR SOLUTION INTRAMUSCULAR; INTRAVENOUS at 06:06

## 2018-11-27 RX ADMIN — IPRATROPIUM BROMIDE AND ALBUTEROL SULFATE 3 ML: .5; 3 SOLUTION RESPIRATORY (INHALATION) at 12:22

## 2018-11-27 RX ADMIN — GUAIFENESIN 600 MG: 600 TABLET, EXTENDED RELEASE ORAL at 08:19

## 2018-11-27 RX ADMIN — HYDROCODONE POLISTIREX AND CHLORPHENIRAMINE POLISTIREX 5 ML: 10; 8 SUSPENSION, EXTENDED RELEASE ORAL at 20:57

## 2018-11-27 RX ADMIN — IPRATROPIUM BROMIDE AND ALBUTEROL SULFATE 3 ML: .5; 3 SOLUTION RESPIRATORY (INHALATION) at 03:56

## 2018-11-27 RX ADMIN — Medication 10 ML: at 21:01

## 2018-11-27 RX ADMIN — AZITHROMYCIN MONOHYDRATE 500 MG: 500 INJECTION, POWDER, LYOPHILIZED, FOR SOLUTION INTRAVENOUS at 18:30

## 2018-11-27 RX ADMIN — Medication 10 ML: at 12:22

## 2018-11-27 RX ADMIN — ENOXAPARIN SODIUM 40 MG: 40 INJECTION, SOLUTION INTRAVENOUS; SUBCUTANEOUS at 20:55

## 2018-11-27 NOTE — PROGRESS NOTES
TRANSFER - OUT REPORT: 
 
Verbal report given to MAITE Serrano(name) on Da Ramsey  being transferred to PCU(unit) for routine progression of care Report consisted of patients Situation, Background, Assessment and  
Recommendations(SBAR). Information from the following report(s) SBAR was reviewed with the receiving nurse. Lines:  
Peripheral IV 11/27/18 Right Antecubital (Active) Site Assessment Clean, dry, & intact 11/27/2018 11:35 AM  
Phlebitis Assessment 0 11/27/2018 11:35 AM  
Infiltration Assessment 0 11/27/2018 11:35 AM  
Dressing Status Clean, dry, & intact 11/27/2018 11:35 AM  
Dressing Type Tape;Transparent 11/27/2018 11:35 AM  
  
 
Opportunity for questions and clarification was provided. Patient transported with: 
 Registered Nurse

## 2018-11-27 NOTE — PROGRESS NOTES
2240- Patient transferred to room. Vitals completed and put in chart. 2330 Bedside shift change report given to [address] [place] [address] rn (oncoming nurse) by Dao Hodge (offgoing nurse). Report included the following information Kardex, ED Summary, Recent Results and Cardiac Rhythm NSR.

## 2018-11-27 NOTE — ED NOTES
1130- Pt found in hallway out side of room stating that he was going outside. Pt had disconected all monitoring equipment and IV. Pt admitted to to have just smoked 1 cigarette in his room. Cigarettes removed from patient possession and patient placed back in bed on monitor.

## 2018-11-27 NOTE — PROGRESS NOTES
1820 - TRANSFER - IN REPORT: 
 
Verbal report received from Peter(name) on Burton Riojas  being received from ED(unit) for routine progression of care Report consisted of patients Situation, Background, Assessment and  
Recommendations(SBAR). Information from the following report(s) SBAR, Kardex, Procedure Summary and Intake/Output was reviewed with the receiving nurse. Opportunity for questions and clarification was provided. 1852 - Pt arrived to unit via stretcher. On 2LNC. VSS. Oriented to room and unit routines.

## 2018-11-27 NOTE — PROGRESS NOTES
Hospitalist Progress Note NAME: Azalia De :  1955 MRN:  [id number] Assessment / Plan: 
Acute Respiratory Failure with Hypoxia POA c/w supplemental O2, wean down as tolerated. Acute COPD exacerbation POA c/w Z-max, bronchodilators, mucolytics, steroids, monitor. H/o Chronic Emphysema from Tobacco Abuse  Still smoking found smoking in the room in the ER, counseled H/o Recent Falls with subsequent Bilateral Rib Fractures- Subacute CXR no acute ASD, Chronic emphysema noted, Subacute bilateral Rib Fractures noted Initial ABG= 1.35/47/146/Bicarb 25, 99% on 40% FIO2 on BIPAP Code Status: Full Surrogate Decision Maker: daughter Gerry Ford DVT Prophylaxis: SQ lovenox GI Prophylaxis: IV pepcid for now Baseline: Pt is independent with ADLs at baseline, not on oxygen at home, is a smoker but is cutting down on his cigarettes & has been down 1 pack every 3 days now Subjective: Chief Complaint / Reason for Physician Visit \"I feel very SOB\". Discussed with RN events overnight. Review of Systems: 
Symptom Y/N Comments  Symptom Y/N Comments Fever/Chills    Chest Pain Poor Appetite    Edema Cough y   Abdominal Pain Sputum    Joint Pain SOB/KO y   Pruritis/Rash Nausea/vomit    Tolerating PT/OT Diarrhea    Tolerating Diet y Constipation    Other Could NOT obtain due to:   
 
Objective: VITALS:  
Last 24hrs VS reviewed since prior progress note. Most recent are: 
Patient Vitals for the past 24 hrs: 
 Temp Pulse Resp BP SpO2  
18 1200  100 21 110/68 100 % 18 1000  93 18 114/67 99 % 18 0921  (!) 105 15 130/74 100 % 18 0804  84 20  100 % 18 0730  91 23  98 % 18 0700  78 17 98/63 94 % 18 0540     95 % 18 0430  96 20 (!) 124/99 97 % 18 0354     96 % 18 0030 97 °F (36.1 °C) 71 14 111/61 98 % 18 2344     99 % 11/26/18 2241 98 °F (36.7 °C) 80 15 103/67 97 % 11/26/18 1925     97 % 11/26/18 1630  87 21 131/70 99 % 11/26/18 1620    (!) 141/95   
11/26/18 1438     99 % 11/26/18 1429 97.5 °F (36.4 °C) (!) 116 (!) 36  94 % Intake/Output Summary (Last 24 hours) at 11/27/2018 1312 Last data filed at 11/27/2018 0857 Gross per 24 hour Intake  Output 200 ml Net -200 ml PHYSICAL EXAM: 
General: WD, WN. Alert, cooperative, no acute distress   
EENT:  EOMI. Anicteric sclerae. MMM Resp:  Coarse BS, rhonchi, mild wheezing. CV:  Regular  rhythm,  No edema GI:  Soft, Non distended, Non tender.  +Bowel sounds Neurologic:  Alert and oriented X 3, normal speech, Psych:   Good insight. Not anxious nor agitated Skin:  No rashes. No jaundice Reviewed most current lab test results and cultures  YES Reviewed most current radiology test results   YES Review and summation of old records today    NO Reviewed patient's current orders and MAR    YES 
PMH/SH reviewed - no change compared to H&P 
________________________________________________________________________ Care Plan discussed with: 
  Comments Patient y Family RN y   
Care Manager Consultant Multidiciplinary team rounds were held today with , nursing, pharmacist and clinical coordinator. Patient's plan of care was discussed; medications were reviewed and discharge planning was addressed. ________________________________________________________________________ Total NON critical care TIME:  35   Minutes Total CRITICAL CARE TIME Spent:   Minutes non procedure based Comments >50% of visit spent in counseling and coordination of care y   
________________________________________________________________________ Jaziel Jaquez MD  
 
Procedures: see electronic medical records for all procedures/Xrays and details which were not copied into this note but were reviewed prior to creation of Plan. LABS: 
I reviewed today's most current labs and imaging studies. Pertinent labs include: 
Recent Labs  
  11/27/18 0252 11/26/18 
1431 WBC 4.6 9.0 HGB 14.1 15.4 HCT 41.5 45.3  287 Recent Labs  
  11/27/18 0252 11/26/18 
1431  141  
K 4.2 4.1  108 CO2 26 24 * 114* BUN 8 7 CREA 1.01 1.07  
CA 8.8 9.1 ALB  --  4.0 TBILI  --  0.5 SGOT  --  13* ALT  --  15 Signed: Ama Babcock MD

## 2018-11-27 NOTE — PROGRESS NOTES
Patient was witnessed experiencing confusion and SOB while standing outside of his room. Patient had dislodged his IV and cardiac monitoring. He was helped back to his bed and placed on bipap. Patient currently alert and oriented and stable. Informed Dr. Ezequiel Chaudhary of findings and he ordered to change his transfer orders to [facility].

## 2018-11-27 NOTE — ED NOTES
Patient assisted in repositioning for comfort upon change of shift. Pt given fresh ice pack for ribs.

## 2018-11-28 LAB
ALBUMIN SERPL-MCNC: 3.7 G/DL (ref 3.5–5)
ALBUMIN/GLOB SERPL: 1.2 {RATIO} (ref 1.1–2.2)
ALP SERPL-CCNC: 57 U/L (ref 45–117)
ALT SERPL-CCNC: 16 U/L (ref 12–78)
AMPHET UR QL SCN: NEGATIVE
ANION GAP SERPL CALC-SCNC: 8 MMOL/L (ref 5–15)
APPEARANCE UR: CLEAR
ARTERIAL PATENCY WRIST A: YES
AST SERPL-CCNC: 19 U/L (ref 15–37)
BACTERIA URNS QL MICRO: NEGATIVE /HPF
BARBITURATES UR QL SCN: NEGATIVE
BASE EXCESS BLDA CALC-SCNC: 2 MMOL/L
BASOPHILS # BLD: 0 K/UL (ref 0–0.1)
BASOPHILS NFR BLD: 0 % (ref 0–1)
BDY SITE: ABNORMAL
BENZODIAZ UR QL: NEGATIVE
BILIRUB SERPL-MCNC: 0.4 MG/DL (ref 0.2–1)
BILIRUB UR QL: NEGATIVE
BUN SERPL-MCNC: 9 MG/DL (ref 6–20)
BUN/CREAT SERPL: 11 (ref 12–20)
CALCIUM SERPL-MCNC: 9.1 MG/DL (ref 8.5–10.1)
CANNABINOIDS UR QL SCN: NEGATIVE
CHLORIDE SERPL-SCNC: 108 MMOL/L (ref 97–108)
CO2 SERPL-SCNC: 23 MMOL/L (ref 21–32)
COCAINE UR QL SCN: NEGATIVE
COLOR UR: NORMAL
CREAT SERPL-MCNC: 0.84 MG/DL (ref 0.7–1.3)
DIFFERENTIAL METHOD BLD: ABNORMAL
DRUG SCRN COMMENT,DRGCM: ABNORMAL
EOSINOPHIL # BLD: 0 K/UL (ref 0–0.4)
EOSINOPHIL NFR BLD: 0 % (ref 0–7)
EPITH CASTS URNS QL MICRO: NORMAL /LPF
ERYTHROCYTE [DISTWIDTH] IN BLOOD BY AUTOMATED COUNT: 12.4 % (ref 11.5–14.5)
GAS FLOW.O2 O2 DELIVERY SYS: 2 L/MIN
GLOBULIN SER CALC-MCNC: 3.1 G/DL (ref 2–4)
GLUCOSE SERPL-MCNC: 122 MG/DL (ref 65–100)
GLUCOSE UR STRIP.AUTO-MCNC: NEGATIVE MG/DL
HCO3 BLDA-SCNC: 26 MMOL/L (ref 22–26)
HCT VFR BLD AUTO: 37.8 % (ref 36.6–50.3)
HGB BLD-MCNC: 13 G/DL (ref 12.1–17)
HGB UR QL STRIP: NEGATIVE
HYALINE CASTS URNS QL MICRO: NORMAL /LPF (ref 0–5)
IMM GRANULOCYTES # BLD: 0.1 K/UL (ref 0–0.04)
IMM GRANULOCYTES NFR BLD AUTO: 1 % (ref 0–0.5)
KETONES UR QL STRIP.AUTO: NEGATIVE MG/DL
LEUKOCYTE ESTERASE UR QL STRIP.AUTO: NEGATIVE
LYMPHOCYTES # BLD: 0.9 K/UL (ref 0.8–3.5)
LYMPHOCYTES NFR BLD: 8 % (ref 12–49)
MAGNESIUM SERPL-MCNC: 2 MG/DL (ref 1.6–2.4)
MCH RBC QN AUTO: 32.3 PG (ref 26–34)
MCHC RBC AUTO-ENTMCNC: 34.4 G/DL (ref 30–36.5)
MCV RBC AUTO: 94 FL (ref 80–99)
METHADONE UR QL: NEGATIVE
MONOCYTES # BLD: 0.4 K/UL (ref 0–1)
MONOCYTES NFR BLD: 3 % (ref 5–13)
NEUTS SEG # BLD: 10.6 K/UL (ref 1.8–8)
NEUTS SEG NFR BLD: 88 % (ref 32–75)
NITRITE UR QL STRIP.AUTO: NEGATIVE
NRBC # BLD: 0 K/UL (ref 0–0.01)
NRBC BLD-RTO: 0 PER 100 WBC
OPIATES UR QL: POSITIVE
PCO2 BLDA: 39 MMHG (ref 35–45)
PCP UR QL: NEGATIVE
PH BLDA: 7.45 [PH] (ref 7.35–7.45)
PH UR STRIP: 6.5 [PH] (ref 5–8)
PLATELET # BLD AUTO: 225 K/UL (ref 150–400)
PMV BLD AUTO: 9.6 FL (ref 8.9–12.9)
PO2 BLDA: 73 MMHG (ref 80–100)
POTASSIUM SERPL-SCNC: 4.2 MMOL/L (ref 3.5–5.1)
PROT SERPL-MCNC: 6.8 G/DL (ref 6.4–8.2)
PROT UR STRIP-MCNC: NEGATIVE MG/DL
RBC # BLD AUTO: 4.02 M/UL (ref 4.1–5.7)
RBC #/AREA URNS HPF: NORMAL /HPF (ref 0–5)
SAO2 % BLD: 95 % (ref 92–97)
SAO2% DEVICE SAO2% SENSOR NAME: ABNORMAL
SODIUM SERPL-SCNC: 139 MMOL/L (ref 136–145)
SP GR UR REFRACTOMETRY: 1.01 (ref 1–1.03)
SPECIMEN SITE: ABNORMAL
UA: UC IF INDICATED,UAUC: NORMAL
UROBILINOGEN UR QL STRIP.AUTO: 0.2 EU/DL (ref 0.2–1)
WBC # BLD AUTO: 11.9 K/UL (ref 4.1–11.1)
WBC URNS QL MICRO: NORMAL /HPF (ref 0–4)

## 2018-11-28 PROCEDURE — G8979 MOBILITY GOAL STATUS: HCPCS

## 2018-11-28 PROCEDURE — 77010033678 HC OXYGEN DAILY

## 2018-11-28 PROCEDURE — G8980 MOBILITY D/C STATUS: HCPCS

## 2018-11-28 PROCEDURE — 74011250637 HC RX REV CODE- 250/637: Performed by: INTERNAL MEDICINE

## 2018-11-28 PROCEDURE — 80053 COMPREHEN METABOLIC PANEL: CPT

## 2018-11-28 PROCEDURE — 81001 URINALYSIS AUTO W/SCOPE: CPT

## 2018-11-28 PROCEDURE — 36600 WITHDRAWAL OF ARTERIAL BLOOD: CPT

## 2018-11-28 PROCEDURE — 74011250636 HC RX REV CODE- 250/636: Performed by: INTERNAL MEDICINE

## 2018-11-28 PROCEDURE — 97530 THERAPEUTIC ACTIVITIES: CPT

## 2018-11-28 PROCEDURE — 85025 COMPLETE CBC W/AUTO DIFF WBC: CPT

## 2018-11-28 PROCEDURE — 94640 AIRWAY INHALATION TREATMENT: CPT

## 2018-11-28 PROCEDURE — 83735 ASSAY OF MAGNESIUM: CPT

## 2018-11-28 PROCEDURE — 36415 COLL VENOUS BLD VENIPUNCTURE: CPT

## 2018-11-28 PROCEDURE — 80307 DRUG TEST PRSMV CHEM ANLYZR: CPT

## 2018-11-28 PROCEDURE — 82803 BLOOD GASES ANY COMBINATION: CPT

## 2018-11-28 PROCEDURE — 74011636637 HC RX REV CODE- 636/637: Performed by: INTERNAL MEDICINE

## 2018-11-28 PROCEDURE — G8978 MOBILITY CURRENT STATUS: HCPCS

## 2018-11-28 PROCEDURE — 65660000000 HC RM CCU STEPDOWN

## 2018-11-28 PROCEDURE — 74011250637 HC RX REV CODE- 250/637: Performed by: NURSE PRACTITIONER

## 2018-11-28 PROCEDURE — 97161 PT EVAL LOW COMPLEX 20 MIN: CPT

## 2018-11-28 PROCEDURE — 74011000250 HC RX REV CODE- 250: Performed by: INTERNAL MEDICINE

## 2018-11-28 RX ORDER — LORAZEPAM 2 MG/ML
4 INJECTION INTRAMUSCULAR
Status: DISCONTINUED | OUTPATIENT
Start: 2018-11-28 | End: 2018-11-29

## 2018-11-28 RX ORDER — LORAZEPAM 2 MG/ML
1 INJECTION INTRAMUSCULAR
Status: DISCONTINUED | OUTPATIENT
Start: 2018-11-28 | End: 2018-12-03 | Stop reason: HOSPADM

## 2018-11-28 RX ORDER — HALOPERIDOL 5 MG/ML
5 INJECTION INTRAMUSCULAR ONCE
Status: COMPLETED | OUTPATIENT
Start: 2018-11-28 | End: 2018-11-28

## 2018-11-28 RX ORDER — QUETIAPINE FUMARATE 25 MG/1
25 TABLET, FILM COATED ORAL
Status: DISCONTINUED | OUTPATIENT
Start: 2018-11-28 | End: 2018-11-29

## 2018-11-28 RX ORDER — IPRATROPIUM BROMIDE AND ALBUTEROL SULFATE 2.5; .5 MG/3ML; MG/3ML
3 SOLUTION RESPIRATORY (INHALATION)
Status: DISCONTINUED | OUTPATIENT
Start: 2018-11-28 | End: 2018-12-02

## 2018-11-28 RX ORDER — FAMOTIDINE 20 MG/1
20 TABLET, FILM COATED ORAL 2 TIMES DAILY
Status: DISCONTINUED | OUTPATIENT
Start: 2018-11-28 | End: 2018-12-03 | Stop reason: HOSPADM

## 2018-11-28 RX ORDER — IBUPROFEN 200 MG
1 TABLET ORAL DAILY
Status: DISCONTINUED | OUTPATIENT
Start: 2018-11-28 | End: 2018-12-03 | Stop reason: HOSPADM

## 2018-11-28 RX ORDER — PREDNISONE 20 MG/1
40 TABLET ORAL
Status: DISCONTINUED | OUTPATIENT
Start: 2018-11-28 | End: 2018-11-30

## 2018-11-28 RX ORDER — HYDROCODONE POLISTIREX AND CHLORPHENIRAMINE POLISTIREX 10; 8 MG/5ML; MG/5ML
5 SUSPENSION, EXTENDED RELEASE ORAL
Status: DISCONTINUED | OUTPATIENT
Start: 2018-11-28 | End: 2018-12-03 | Stop reason: HOSPADM

## 2018-11-28 RX ORDER — LORAZEPAM 2 MG/ML
2 INJECTION INTRAMUSCULAR
Status: DISCONTINUED | OUTPATIENT
Start: 2018-11-28 | End: 2018-11-29

## 2018-11-28 RX ADMIN — GUAIFENESIN 600 MG: 600 TABLET, EXTENDED RELEASE ORAL at 08:50

## 2018-11-28 RX ADMIN — HALOPERIDOL LACTATE 5 MG: 5 INJECTION, SOLUTION INTRAMUSCULAR at 02:57

## 2018-11-28 RX ADMIN — AZITHROMYCIN MONOHYDRATE 500 MG: 500 INJECTION, POWDER, LYOPHILIZED, FOR SOLUTION INTRAVENOUS at 17:12

## 2018-11-28 RX ADMIN — FAMOTIDINE 20 MG: 20 TABLET ORAL at 08:50

## 2018-11-28 RX ADMIN — MELATONIN TAB 3 MG 3 MG: 3 TAB at 21:08

## 2018-11-28 RX ADMIN — IPRATROPIUM BROMIDE AND ALBUTEROL SULFATE 3 ML: .5; 3 SOLUTION RESPIRATORY (INHALATION) at 07:53

## 2018-11-28 RX ADMIN — GUAIFENESIN 600 MG: 600 TABLET, EXTENDED RELEASE ORAL at 21:08

## 2018-11-28 RX ADMIN — HALOPERIDOL LACTATE 5 MG: 5 INJECTION, SOLUTION INTRAMUSCULAR at 08:50

## 2018-11-28 RX ADMIN — IPRATROPIUM BROMIDE AND ALBUTEROL SULFATE 3 ML: .5; 3 SOLUTION RESPIRATORY (INHALATION) at 14:28

## 2018-11-28 RX ADMIN — LORAZEPAM 2 MG: 2 INJECTION INTRAMUSCULAR; INTRAVENOUS at 21:04

## 2018-11-28 RX ADMIN — ENOXAPARIN SODIUM 40 MG: 40 INJECTION, SOLUTION INTRAVENOUS; SUBCUTANEOUS at 21:07

## 2018-11-28 RX ADMIN — MELATONIN TAB 3 MG 3 MG: 3 TAB at 00:11

## 2018-11-28 RX ADMIN — HALOPERIDOL LACTATE 5 MG: 5 INJECTION, SOLUTION INTRAMUSCULAR at 00:11

## 2018-11-28 RX ADMIN — LORAZEPAM 4 MG: 2 INJECTION INTRAMUSCULAR; INTRAVENOUS at 22:55

## 2018-11-28 RX ADMIN — IPRATROPIUM BROMIDE AND ALBUTEROL SULFATE 3 ML: .5; 3 SOLUTION RESPIRATORY (INHALATION) at 20:07

## 2018-11-28 RX ADMIN — QUETIAPINE FUMARATE 25 MG: 25 TABLET ORAL at 21:08

## 2018-11-28 RX ADMIN — Medication 10 ML: at 05:34

## 2018-11-28 RX ADMIN — SODIUM CHLORIDE 50 ML/HR: 900 INJECTION, SOLUTION INTRAVENOUS at 05:34

## 2018-11-28 RX ADMIN — FAMOTIDINE 20 MG: 20 TABLET ORAL at 17:13

## 2018-11-28 RX ADMIN — Medication 10 ML: at 21:08

## 2018-11-28 RX ADMIN — METHYLPREDNISOLONE SODIUM SUCCINATE 40 MG: 125 INJECTION, POWDER, FOR SOLUTION INTRAMUSCULAR; INTRAVENOUS at 05:34

## 2018-11-28 RX ADMIN — LORAZEPAM 1 MG: 2 INJECTION INTRAMUSCULAR; INTRAVENOUS at 17:13

## 2018-11-28 RX ADMIN — PREDNISONE 40 MG: 20 TABLET ORAL at 11:54

## 2018-11-28 RX ADMIN — Medication 10 ML: at 13:37

## 2018-11-28 RX ADMIN — HALOPERIDOL LACTATE 5 MG: 5 INJECTION, SOLUTION INTRAMUSCULAR at 20:20

## 2018-11-28 NOTE — PROGRESS NOTES
Reason for Admission:   Acute exacerbation of chronic obstructive pulmonary disease (COPD) and Acute Respiratory failure with hypoxemia ([facility] [address] RRAT Score:  8 Plan for utilizing home health: Waiting to be seen by PT/OT Likelihood of Readmission:  low Transition of Care Plan:                   
CM made room visit with pt, introduced self and role. When CM asked if it was okay to ask a few questions pt stated \"I don't feel like talking, i'm not up to it\". CM contacted pt daughter Mechelle Garcia, [phone]) who is listed as his emergency contact. Per Daughter (Mechelle Garcia): Pt phone number on file is incorrect and it should be [phone]. All other demographics, insurance, emergency contact, and PCP are correct. Pt uses [facility] on [address] and takes his RX as prescribed. Pt resides with a roommate in a one story home with four entry steps. Pt has two daughters and they both live 30 minutes away from patient and the only close support system pt has is his roommate. Pt was independent with ADLs/IADLs prior to admission. Pt has no DME, no history with HH, SNF, Rehab, and does not drive. Pt's roommate transports pt where he needs to go. Pt has been retired for two years and is a recovering alcoholic. When CM asked about d/c transportation it was stated by daughter that the roommate should be transporting pt home. CM will follow up with pt after being seen by PT/OT to discuss any needs for services upon d/c. As well as verfying pt's roommate would be transporting pt home upon d/c. CM will continue to follow. Care Management Interventions PCP Verified by CM: Yes Mode of Transport at Discharge: Other (see comment)(roommate to [place] ) Transition of Care Consult (CM Consult): Discharge Planning Discharge Durable Medical Equipment: No 
Physical Therapy Consult: Yes Occupational Therapy Consult:  Yes 
 Speech Therapy Consult: No 
Current Support Network: Own Home, Other(lives with roommate ) Confirm Follow Up Transport: Friends(roommate transports pt does not drive) Plan discussed with Pt/Family/Caregiver: Yes Discharge Location Discharge Placement: ([facility]) [address] [place] [address]

## 2018-11-28 NOTE — PROGRESS NOTES
physical Therapy EVALUATION/DISCHARGE Patient: Adolfo English (78 y.o. male) Date: 11/28/2018 Primary Diagnosis: Acute exacerbation of chronic obstructive pulmonary disease (COPD) ([facility] [address] Acute respiratory failure with hypoxemia (HCC) Precautions:   Fall ASSESSMENT : 
Based on the objective data described below, the patient presents with baseline levels of functional mobility and mild balance deficits. Pt presented sitting EOB, pleasant and agreeable to therapy. Pt cleared by RN to mobilize. Pt history and PLOF was recounted as noted below. Pt on 2 L NC O2 throughout session, pt does not use O2 at home. BP stable throughout session. At rest pt's SpO2 at 90-91%. Pt able to perform sit<>stand transfers initially with CGA, but progressing during session to S only. Pt amb into bathroom, able to transfer on/off toilet with S. Pt then amb in hallway with CGA for 290 ft, demonstrating narrowed JACKSON, decreased step clearance, and decreased step length, which pt notes all of which are present at baseline. Pt amb at self selected gait speed of 0.96 m/s placing him at a level of community ambulator. Pt with some KO, SpO2 measured at 88%, but able to return to above 90% with 1 min standing rest break and deep breathing techniques. Pt rating KO 7/10 currently, and when asked if he could compare to baseline he was unable to rate for comparison. Upon return to room pt administered Lawson Balance test, with a score of 52/56, placing him at low risk of falling. Pt had the most difficulty in performing tandem stance and SLS, pt also with some complaint of dizziness when performing stepping task which alleviated with seated rest (BP stable). Pt returned to sitting in chair to end session, bed alarm attached, RN aware. Throughout session pt increasingly anxious and at times impulsive, but with clear, calm instructions pt able to participate in all activity safely. Skilled physical therapy is not indicated at this time as pt is at his baseline level of functional mobility. It is recommended that prior to discharge 6 MWT be performed with RT/RN to determine if O2 use is needed for home. It is recommended that pt receive HHPT at least for a home safety eval, and to possibly work on his balance deficits described above. PLAN : 
Discharge Recommendations: Home Health Further Equipment Recommendations for Discharge: None SUBJECTIVE:  
Patient stated I've got so many of these wires tangling me up.  OBJECTIVE DATA SUMMARY:  
HISTORY:   
Past Medical History:  
Diagnosis Date  Asthma  COPD (chronic obstructive pulmonary disease) (HCC) History reviewed. No pertinent surgical history. Prior Level of Function/Home Situation: PTA pt independent in amb without use of AD. Pt living at home with his roommate, who is also retired. Pt no longer drives, and relies on roommate for transportation. Pt reports being independent in ADLs including bathing and dressing. Pt reports one fall in the past year, related to ETOH which resulted in broken ribs in August. Pt reports he has not drank since this date, but is still currently a smoker. Personal factors and/or comorbidities impacting plan of care: COPD, Pulmonary Emphysema, Anxiety Home Situation Home Environment: Private residence # Steps to Enter: 4 Rails to Enter: Yes Hand Rails : Bilateral 
One/Two Story Residence: Two story, live on 1st floor Living Alone: No(Roommate) Support Systems: Friends \ neighbors Patient Expects to be Discharged to[de-identified] Private residence Current DME Used/Available at Home: None Tub or Shower Type: Tub/Shower combination EXAMINATION/PRESENTATION/DECISION MAKING:  
Critical Behavior: 
Neurologic State: Confused Orientation Level: Disoriented to time, Oriented to place, Oriented to person, Disoriented to situation Cognition: Impaired decision making, Impulsive, Poor safety awareness Skin:  Dry, flaky Range Of Motion: 
AROM: Within functional limits Strength:   
Strength: Within functional limits Functional Mobility: 
Bed Mobility:  Not observed, pt received sitting EOB returned sitting in chair Transfers: 
Sit to Stand: Contact guard assistance;Supervision Stand to Sit: Contact guard assistance;Supervision Balance:  
Sitting: Intact Standing: Impaired Standing - Static: Good Standing - Dynamic : Fair(occasional) Ambulation/Gait Training: 
Distance (ft): 290 Feet (ft) Assistive Device: Gait belt Ambulation - Level of Assistance: Contact guard assistance;Assist x1 Gait Description (WDL): Exceptions to [facility] Gait Abnormalities: Decreased step clearance Base of Support: Narrowed Speed/Meseret: Slow(0.96 m/s self selected) Step Length: Left shortened;Right shortened Gait Speed: 
 
Utilizing distance of 22 feet for test, as well as feet to meters calculation, patient ambulated at 0.96 m/s with self-selected gait speed. Ila RAMOS. \"Comfortable and maximum walking speed of adults aged 20-79 years: reference values and determinants. \" Age and Agin Volume 26(1):15-9. Elio Wilder. \"Age- and gender-related test performance in community-dwelling elderly people: Six-Minute Walk Test, Lawson Balance Scale, Timed Up & Go Test, and gait speeds. \" Physical Therapy: 2002 Volume 82(2):128-37. Suzette WILKES, Kay PW, Jostin DAVIDSOND, [facility] KASSANDRA. \"Assessing stability and change of four performance measures: a longitudinal study evaluating outcome following total hip and knee arthroplasty. \" [facility] Musculoskeletal Disorders: 2005 Volume 6(3).  
 
Gerda Chaudhry, PhD; Jamal Brennan, PhD. [facility] Paper: \"Walking Speed: the Sixth Vital Sign\" Journal of Geriatric Physical Therapy:  - Volume 32 - Issue 2 - p 25 . Gilma Vicente DPT; Mercy Laguna PhD; Arely Manning PT PHD. Walking Speed: The Functional Vital Sign. Journal of Aging Phys Act 2015 April; 23(2):314-322. Functional Measure: 
[place] Balance Test: 
 
Sitting to Standin Standing Unsupported: 4 Sitting with Back Unsupported: 4 Standing to Sittin Transfers: 4 Standing Unsupported with Eyes Closed: 4 Standing Unsupported with Feet Together: 4 Reach Forward with Outstretched Arm: 4  Object: 3 Turn to Look Over Shoulders: 4 Turn 360 Degrees: 4 Alternate Foot on Step/Stool: 3 Standing Unsupported One Foot in Front: 3 Stand on One Leg: 3 Total: 52 
 
 
 
56=Maximum possible score;  
0-20=High fall risk 21-40=Moderate fall risk 41-56=Low fall risk Lawson Balance Test and G-code impairment scale: 
Percentage of Impairment CH 
 
0% 
 CI 
 
1-19% CJ 
 
20-39% CK 
 
40-59% CL 
 
60-79% CM 
 
80-99% CN  
 
100% [place] Score 0-56 56 45-55 34-44 23-33 12-22 1-11 0  
 
 
 
G codes: In compliance with CMSs Claims Based Outcome Reporting, the following G-code set was chosen for this patient based on their primary functional limitation being treated: The outcome measure chosen to determine the severity of the functional limitation was the [place] Bongo with a score of 52/56 which was correlated with the impairment scale. ? Mobility - Walking and Moving Around:  
  - CURRENT STATUS: CI - 1%-19% impaired, limited or restricted  - GOAL STATUS: CI - 1%-19% impaired, limited or restricted  - D/C STATUS:  CI - 1%-19% impaired, limited or restricted Pain: 
Pain Scale 1: Numeric (0 - 10) Pain Intensity 1: 0 Activity Tolerance: Pt with fair activity tolerance, limited somewhat by KO and decreased SpO2 in amb distance Please refer to the flowsheet for vital signs taken during this treatment. After treatment:  
[x]   Patient left in no apparent distress sitting up in chair []   Patient left in no apparent distress in bed 
[x]   Call bell left within reach [x]   Nursing notified 
[]   Caregiver present [x]   Bed alarm activated COMMUNICATION/EDUCATION:  
Communication/Collaboration: 
[x]   Fall prevention education was provided and the patient/caregiver indicated understanding. [x]   Patient/family have participated as able and agree with findings and recommendations. []   Patient is unable to participate in plan of care at this time. Findings and recommendations were discussed with: Registered Nurse Thank you for this referral. 
Flash Vela, SOLO Time Calculation: 23 mins Regarding student involvement in patient care: A student participated in this treatment session. Per CMS Medicare statements and APTA guidelines I certify that the following was true: 1. I was present and directly observed the entire session. 2. I made all skilled judgments and clinical decisions regarding care. 3. I am the practitioner responsible for assessment, treatment, and documentation.

## 2018-11-28 NOTE — CONSULTS
PSYCHIATRIC CONSULTATION                         IDENTIFICATION:    Patient Name  Devan Moyer   Date of Birth 1955   [facility] [id number]   Medical Record Number  [id number]      Age  70 y.o. PCP Anjali Jansen MD   Admit date:  11/26/2018    Room Number  2245/01  @ [facility]   Date of Service  11/28/2018            HISTORY         REASON FOR HOSPITALIZATION/CONSULTATION:  A psychiatric consultation was requested by Coretta Guevara MD to evaluate or provide advice/opinion related to evaluating for capacity evaluation  CC: \"confused\"    HISTORY OF PRESENT ILLNESS:    The patient, Devan Moyer, is a 70 y.o. WHITE [place]  male with a past psychiatric history significant for alcohol use  , who was admitted to the medical floor for the treatment of <principal problem not specified>. Pt seen today for evaluation. He is restless, confused and disoriented to time and place. He is not making eye contact and appear to be disorganized in thought process. He is unable to tell his reason for admission and make comments without any relevance. He is tangential and difficult to understand. No overt psychosis or cindi. Labs reviewed   ALLERGIES:  No Known Allergies   MEDICATIONS PRIOR TO ADMISSION:  Medications Prior to Admission   Medication Sig    albuterol (PROVENTIL HFA, VENTOLIN HFA, PROAIR HFA) 90 mcg/actuation inhaler Take 2 Puffs by inhalation every four (4) hours as needed for Wheezing.  albuterol (ACCUNEB) 1.25 mg/3 mL nebu Take 3 mL by inhalation every four (4) hours as needed (wheezing). PAST MEDICAL HISTORY:  Past Medical History:   Diagnosis Date    Asthma     COPD (chronic obstructive pulmonary disease) ([facility] [address]    History reviewed. No pertinent surgical history.    SOCIAL HISTORY:   Social History     Socioeconomic History    Marital status: SINGLE     Spouse name: Not on file    Number of children: Not on file    Years of education: Not on file    Highest education level: Not on file   Social Needs    Financial resource strain: Not on file    Food insecurity - worry: Not on file    Food insecurity - inability: Not on file    Transportation needs - medical: Not on file   [employer] needs - non-medical: Not on file   Occupational History    Not on file   Tobacco Use    Smoking status: Current Every Day Smoker     Packs/day: 0.50    Smokeless tobacco: Never Used   Substance and Sexual Activity    Alcohol use: Yes     Comment: 2 bottles of wine x 40 years, 24 beers per day    Drug use: No    Sexual activity: Not on file   Other Topics Concern    Not on file   Social History Narrative    Not on file      FAMILY HISTORY: History reviewed. No pertinent family history. History reviewed. No pertinent family history. REVIEW of SYSTEMS:   Psychological ROS: positive for - concentration difficulties, disorientation, memory difficulties and confusion  Pertinent items are noted in the History of Present Illness. All other Systems reviewed and are considered negative. MENTAL STATUS EXAM & VITALS       MENTAL STATUS EXAM (MSE):    MSE FINDINGS ARE WITHIN NORMAL LIMITS (WNL) UNLESS OTHERWISE STATED BELOW. Orientation Alert and Oriented x 1   Vital Signs (BP,Pulse, Temp) See below (reviewed 11/28/2018); vital signs are WNL if not listed below.    Gait and Station Stable, no ataxia   Abnormal Muscular Movements/Tone/Behavior No EPS, no Tardive Dyskinesia, no abnormal muscular movements; wnl tone   Relations passive, unreliable and vague   General Appearance:  age appropriate and disheveled   Language No aphasia or dysarthria   Speech:  hypoverbal   Thought Processes illogical, poor abstract reasoning and computation   Thought Associations illogical and disorganized   Thought Content free of delusions and free of hallucinations   Suicidal Ideations none   Homicidal Ideations no intention   Mood:  anxious   Affect:  anxious and increased in intensity   Memory recent  impaired   Memory remote:  impaired   Concentration/Attention:  impaired   Fund of Knowledge john   Insight:  poor   Reliability poor   Judgment:  poor            VITALS:     Patient Vitals for the past 24 hrs:   Temp Pulse Resp BP SpO2   11/28/18 1527 98 °F (36.7 °C) (!) 110 18 102/85 98 %   11/28/18 1428     97 %   11/28/18 1105  91  95/54 98 %   11/28/18 1102 97.6 °F (36.4 °C) 88 18 95/54 96 %   11/28/18 0753     96 %   11/28/18 0719 97.9 °F (36.6 °C) 84 20 135/65 98 %   11/28/18 0349 98 °F (36.7 °C) 87 21 142/90 100 %   11/28/18 0016     97 %   11/27/18 2345 98.4 °F (36.9 °C) (!) 104 18 (!) 121/98 97 %   11/27/18 2014 97.8 °F (36.6 °C) 96 16 119/72 98 %   11/27/18 1853 97.4 °F (36.3 °C) 95 24 (!) 127/92 95 %              DATA     LABORATORY DATA:  Labs Reviewed   CBC WITH AUTOMATED DIFF - Abnormal; Notable for the following components:       Result Value    EOSINOPHILS 17 (*)     ABS. LYMPHOCYTES 3.7 (*)     ABS. EOSINOPHILS 1.5 (*)     All other components within normal limits   METABOLIC PANEL, COMPREHENSIVE - Abnormal; Notable for the following components:    Glucose 114 (*)     BUN/Creatinine ratio 7 (*)     AST (SGOT) 13 (*)     All other components within normal limits   BLOOD GAS, ARTERIAL - Abnormal; Notable for the following components:    PCO2 47 (*)     PO2 146 (*)     O2 SAT 99 (*)     All other components within normal limits   NT-PRO BNP - Abnormal; Notable for the following components:    NT pro- (*)     All other components within normal limits   METABOLIC PANEL, BASIC - Abnormal; Notable for the following components:    Glucose 152 (*)     BUN/Creatinine ratio 8 (*)     All other components within normal limits   CBC WITH AUTOMATED DIFF - Abnormal; Notable for the following components:    WBC 11.9 (*)     RBC 4.02 (*)     NEUTROPHILS 88 (*)     LYMPHOCYTES 8 (*)     MONOCYTES 3 (*)     IMMATURE GRANULOCYTES 1 (*)     ABS.  NEUTROPHILS 10.6 (*) ABS. IMM. GRANS. 0.1 (*)     All other components within normal limits   METABOLIC PANEL, COMPREHENSIVE - Abnormal; Notable for the following components:    Glucose 122 (*)     BUN/Creatinine ratio 11 (*)     All other components within normal limits   BLOOD GAS, ARTERIAL - Abnormal; Notable for the following components:    PO2 73 (*)     All other components within normal limits   DRUG SCREEN, URINE - Abnormal; Notable for the following components:    OPIATES POSITIVE (*)     All other components within normal limits   INFLUENZA A & B AG (RAPID TEST)   SAMPLES BEING HELD   ETHYL ALCOHOL   BLOOD GAS, ARTERIAL   LACTIC ACID   D DIMER   CBC W/O DIFF   MAGNESIUM   URINALYSIS W/ REFLEX CULTURE     Admission on 11/26/2018   Component Date Value Ref Range Status    WBC 11/26/2018 9.0  4.1 - 11.1 K/uL Final    RBC 11/26/2018 4.79  4.10 - 5.70 M/uL Final    HGB 11/26/2018 15.4  12.1 - 17.0 g/dL Final    HCT 11/26/2018 45.3  36.6 - 50.3 % Final    MCV 11/26/2018 94.6  80.0 - 99.0 FL Final    MCH 11/26/2018 32.2  26.0 - 34.0 PG Final    MCHC 11/26/2018 34.0  30.0 - 36.5 g/dL Final    RDW 11/26/2018 12.4  11.5 - 14.5 % Final    PLATELET 08/84/5461 252  150 - 400 K/uL Final    MPV 11/26/2018 9.7  8.9 - 12.9 FL Final    NRBC 11/26/2018 0.0  0  WBC Final    ABSOLUTE NRBC 11/26/2018 0.00  0.00 - 0.01 K/uL Final    NEUTROPHILS 11/26/2018 35  32 - 75 % Final    LYMPHOCYTES 11/26/2018 42  12 - 49 % Final    MONOCYTES 11/26/2018 5  5 - 13 % Final    EOSINOPHILS 11/26/2018 17* 0 - 7 % Final    BASOPHILS 11/26/2018 1  0 - 1 % Final    IMMATURE GRANULOCYTES 11/26/2018 0  0.0 - 0.5 % Final    ABS. NEUTROPHILS 11/26/2018 3.2  1.8 - 8.0 K/UL Final    ABS. LYMPHOCYTES 11/26/2018 3.7* 0.8 - 3.5 K/UL Final    ABS. MONOCYTES 11/26/2018 0.5  0.0 - 1.0 K/UL Final    ABS. EOSINOPHILS 11/26/2018 1.5* 0.0 - 0.4 K/UL Final    ABS. BASOPHILS 11/26/2018 0.1  0.0 - 0.1 K/UL Final    ABS. IMM.  GRANS. 11/26/2018 0.0  0.00 - 0.04 K/UL Final    DF 11/26/2018 AUTOMATED    Final    RBC COMMENTS 11/26/2018 NORMOCYTIC, NORMOCHROMIC    Final    Sodium 11/26/2018 141  136 - 145 mmol/L Final    Potassium 11/26/2018 4.1  3.5 - 5.1 mmol/L Final    Chloride 11/26/2018 108  97 - 108 mmol/L Final    CO2 11/26/2018 24  21 - 32 mmol/L Final    Anion gap 11/26/2018 9  5 - 15 mmol/L Final    Glucose 11/26/2018 114* 65 - 100 mg/dL Final    BUN 11/26/2018 7  6 - 20 MG/DL Final    Creatinine 11/26/2018 1.07  0.70 - 1.30 MG/DL Final    BUN/Creatinine ratio 11/26/2018 7* 12 - 20   Final    GFR est AA 11/26/2018 >60  >60 ml/min/1.73m2 Final    GFR est non-AA 11/26/2018 >60  >60 ml/min/1.73m2 Final    Calcium 11/26/2018 9.1  8.5 - 10.1 MG/DL Final    Bilirubin, total 11/26/2018 0.5  0.2 - 1.0 MG/DL Final    ALT (SGPT) 11/26/2018 15  12 - 78 U/L Final    AST (SGOT) 11/26/2018 13* 15 - 37 U/L Final    Alk. phosphatase 11/26/2018 76  45 - 117 U/L Final    Protein, total 11/26/2018 7.8  6.4 - 8.2 g/dL Final    Albumin 11/26/2018 4.0  3.5 - 5.0 g/dL Final    Globulin 11/26/2018 3.8  2.0 - 4.0 g/dL Final    A-G Ratio 11/26/2018 1.1  1.1 - 2.2   Final    pH 11/26/2018 7.35  7.35 - 7.45   Final    PCO2 11/26/2018 47* 35.0 - 45.0 mmHg Final    PO2 11/26/2018 146* 80 - 100 mmHg Final    O2 SAT 11/26/2018 99* 92 - 97 % Final    BICARBONATE 11/26/2018 25  22 - 26 mmol/L Final    BASE DEFICIT 11/26/2018 0.9  mmol/L Final    O2 METHOD 11/26/2018 BIPAP    Final    FIO2 11/26/2018 40  % Final    MODE 11/26/2018 BIPAP    Final    IPAP/PIP 11/26/2018 10.0    Final    EPAP/CPAP/PEEP 11/26/2018 5.0    Final    Sample source 11/26/2018 ARTERIAL    Final    SITE 11/26/2018 LEFT RADIAL    Final    ZEKE'S TEST 11/26/2018 YES    Final    SAMPLES BEING HELD 11/26/2018 RED,BLUE   Final    COMMENT 11/26/2018 Add-on orders for these samples will be processed based on acceptable specimen integrity and analyte stability, which may vary by analyte. Final    ALCOHOL(ETHYL),SERUM 11/26/2018 <10  <10 MG/DL Final    NT pro-BNP 11/26/2018 141* 0 - 125 PG/ML Final    Influenza A Antigen 11/26/2018 NEGATIVE   NEG   Final    Influenza B Antigen 11/26/2018 NEGATIVE   NEG   Final    pH 11/26/2018 7.35  7.35 - 7.45   Final    PCO2 11/26/2018 41  35.0 - 45.0 mmHg Final    PO2 11/26/2018 93  80 - 100 mmHg Final    O2 SAT 11/26/2018 97  92 - 97 % Final    BICARBONATE 11/26/2018 22  22 - 26 mmol/L Final    BASE DEFICIT 11/26/2018 3.1  mmol/L Final    O2 METHOD 11/26/2018 BIPAP    Final    FIO2 11/26/2018 40  % Final    SET RATE 11/26/2018 4    Final    SPONTANEOUS RATE 11/26/2018 15.0    Final    IPAP/PIP 11/26/2018 10.0    Final    EPAP/CPAP/PEEP 11/26/2018 5.0    Final    Sample source 11/26/2018 ARTERIAL    Final    SITE 11/26/2018 LEFT RADIAL    Final    ZEKE'S TEST 11/26/2018 YES    Final    Lactic acid 11/26/2018 1.0  0.4 - 2.0 MMOL/L Final    D-dimer 11/26/2018 0.54  0.00 - 0.65 mg/L FEU Final    WBC 11/27/2018 4.6  4.1 - 11.1 K/uL Final    RBC 11/27/2018 4.38  4.10 - 5.70 M/uL Final    HGB 11/27/2018 14.1  12.1 - 17.0 g/dL Final    HCT 11/27/2018 41.5  36.6 - 50.3 % Final    MCV 11/27/2018 94.7  80.0 - 99.0 FL Final    MCH 11/27/2018 32.2  26.0 - 34.0 PG Final    MCHC 11/27/2018 34.0  30.0 - 36.5 g/dL Final    RDW 11/27/2018 12.2  11.5 - 14.5 % Final    PLATELET 44/45/0740 553  150 - 400 K/uL Final    MPV 11/27/2018 9.6  8.9 - 12.9 FL Final    NRBC 11/27/2018 0.0  0  WBC Final    ABSOLUTE NRBC 11/27/2018 0.00  0.00 - 0.01 K/uL Final    Sodium 11/27/2018 138  136 - 145 mmol/L Final    Potassium 11/27/2018 4.2  3.5 - 5.1 mmol/L Final    Chloride 11/27/2018 107  97 - 108 mmol/L Final    CO2 11/27/2018 26  21 - 32 mmol/L Final    Anion gap 11/27/2018 5  5 - 15 mmol/L Final    Glucose 11/27/2018 152* 65 - 100 mg/dL Final    BUN 11/27/2018 8  6 - 20 MG/DL Final    Creatinine 11/27/2018 1.01  0.70 - 1.30 MG/DL Final    BUN/Creatinine ratio 11/27/2018 8* 12 - 20   Final    GFR est AA 11/27/2018 >60  >60 ml/min/1.73m2 Final    GFR est non-AA 11/27/2018 >60  >60 ml/min/1.73m2 Final    Calcium 11/27/2018 8.8  8.5 - 10.1 MG/DL Final    WBC 11/28/2018 11.9* 4.1 - 11.1 K/uL Final    RBC 11/28/2018 4.02* 4.10 - 5.70 M/uL Final    HGB 11/28/2018 13.0  12.1 - 17.0 g/dL Final    HCT 11/28/2018 37.8  36.6 - 50.3 % Final    MCV 11/28/2018 94.0  80.0 - 99.0 FL Final    MCH 11/28/2018 32.3  26.0 - 34.0 PG Final    MCHC 11/28/2018 34.4  30.0 - 36.5 g/dL Final    RDW 11/28/2018 12.4  11.5 - 14.5 % Final    PLATELET 28/78/5970 509  150 - 400 K/uL Final    MPV 11/28/2018 9.6  8.9 - 12.9 FL Final    NRBC 11/28/2018 0.0  0  WBC Final    ABSOLUTE NRBC 11/28/2018 0.00  0.00 - 0.01 K/uL Final    NEUTROPHILS 11/28/2018 88* 32 - 75 % Final    LYMPHOCYTES 11/28/2018 8* 12 - 49 % Final    MONOCYTES 11/28/2018 3* 5 - 13 % Final    EOSINOPHILS 11/28/2018 0  0 - 7 % Final    BASOPHILS 11/28/2018 0  0 - 1 % Final    IMMATURE GRANULOCYTES 11/28/2018 1* 0.0 - 0.5 % Final    ABS. NEUTROPHILS 11/28/2018 10.6* 1.8 - 8.0 K/UL Final    ABS. LYMPHOCYTES 11/28/2018 0.9  0.8 - 3.5 K/UL Final    ABS. MONOCYTES 11/28/2018 0.4  0.0 - 1.0 K/UL Final    ABS. EOSINOPHILS 11/28/2018 0.0  0.0 - 0.4 K/UL Final    ABS. BASOPHILS 11/28/2018 0.0  0.0 - 0.1 K/UL Final    ABS. IMM.  GRANS. 11/28/2018 0.1* 0.00 - 0.04 K/UL Final    DF 11/28/2018 AUTOMATED    Final    Magnesium 11/28/2018 2.0  1.6 - 2.4 mg/dL Final    Sodium 11/28/2018 139  136 - 145 mmol/L Final    Potassium 11/28/2018 4.2  3.5 - 5.1 mmol/L Final    Chloride 11/28/2018 108  97 - 108 mmol/L Final    CO2 11/28/2018 23  21 - 32 mmol/L Final    Anion gap 11/28/2018 8  5 - 15 mmol/L Final    Glucose 11/28/2018 122* 65 - 100 mg/dL Final    BUN 11/28/2018 9  6 - 20 MG/DL Final    Creatinine 11/28/2018 0.84  0.70 - 1.30 MG/DL Final    BUN/Creatinine ratio 11/28/2018 11* 12 - 20 Final    GFR est AA 11/28/2018 >60  >60 ml/min/1.73m2 Final    GFR est non-AA 11/28/2018 >60  >60 ml/min/1.73m2 Final    Calcium 11/28/2018 9.1  8.5 - 10.1 MG/DL Final    Bilirubin, total 11/28/2018 0.4  0.2 - 1.0 MG/DL Final    ALT (SGPT) 11/28/2018 16  12 - 78 U/L Final    AST (SGOT) 11/28/2018 19  15 - 37 U/L Final    Alk.  phosphatase 11/28/2018 57  45 - 117 U/L Final    Protein, total 11/28/2018 6.8  6.4 - 8.2 g/dL Final    Albumin 11/28/2018 3.7  3.5 - 5.0 g/dL Final    Globulin 11/28/2018 3.1  2.0 - 4.0 g/dL Final    A-G Ratio 11/28/2018 1.2  1.1 - 2.2   Final    pH 11/28/2018 7.45  7.35 - 7.45   Final    PCO2 11/28/2018 39  35.0 - 45.0 mmHg Final    PO2 11/28/2018 73* 80 - 100 mmHg Final    O2 SAT 11/28/2018 95  92 - 97 % Final    BICARBONATE 11/28/2018 26  22 - 26 mmol/L Final    BASE EXCESS 11/28/2018 2.0  mmol/L Final    O2 METHOD 11/28/2018 NASAL O2    Final    O2 FLOW RATE 11/28/2018 2.00  L/min Final    Sample source 11/28/2018 ARTERIAL    Final    SITE 11/28/2018 RIGHT RADIAL    Final    ZEKE'S TEST 11/28/2018 YES    Final    Color 11/28/2018 YELLOW/STRAW    Final    Appearance 11/28/2018 CLEAR  CLEAR   Final    Specific gravity 11/28/2018 1.006  1.003 - 1.030   Final    pH (UA) 11/28/2018 6.5  5.0 - 8.0   Final    Protein 11/28/2018 NEGATIVE   NEG mg/dL Final    Glucose 11/28/2018 NEGATIVE   NEG mg/dL Final    Ketone 11/28/2018 NEGATIVE   NEG mg/dL Final    Bilirubin 11/28/2018 NEGATIVE   NEG   Final    Blood 11/28/2018 NEGATIVE   NEG   Final    Urobilinogen 11/28/2018 0.2  0.2 - 1.0 EU/dL Final    Nitrites 11/28/2018 NEGATIVE   NEG   Final    Leukocyte Esterase 11/28/2018 NEGATIVE   NEG   Final    WBC 11/28/2018 0-4  0 - 4 /hpf Final    RBC 11/28/2018 0-5  0 - 5 /hpf Final    Epithelial cells 11/28/2018 FEW  FEW /lpf Final    Bacteria 11/28/2018 NEGATIVE   NEG /hpf Final    UA:UC IF INDICATED 11/28/2018 CULTURE NOT INDICATED BY UA RESULT  CNI   Final    Hyaline cast 11/28/2018 0-2  0 - 5 /lpf Final    AMPHETAMINES 11/28/2018 NEGATIVE   NEG   Final    BARBITURATES 11/28/2018 NEGATIVE   NEG   Final    BENZODIAZEPINES 11/28/2018 NEGATIVE   NEG   Final    COCAINE 11/28/2018 NEGATIVE   NEG   Final    METHADONE 11/28/2018 NEGATIVE   NEG   Final    OPIATES 11/28/2018 POSITIVE* NEG   Final    PCP(PHENCYCLIDINE) 11/28/2018 NEGATIVE   NEG   Final    THC (TH-CANNABINOL) 11/28/2018 NEGATIVE   NEG   Final    Drug screen comment 11/28/2018 (NOTE)   Final        RADIOLOGY REPORTS:  Results from Hospital Encounter encounter on 11/26/18   XR CHEST PORT    Narrative EXAM:  XR CHEST PORT    INDICATION:   SOB    COMPARISON: Chest radiograph 8/24/2018. FINDINGS: AP radiograph of the chest was obtained. Hyperexpanded lungs are again noted. No evidence of focal consolidation. No  pleural effusion or pneumothorax. Heart, burton, mediastinum are within normal  limits. No acute osseous abnormalities. Subacute to chronic bilateral rib  fractures. Impression IMPRESSION:   1. No acute cardiopulmonary process. 2. Chronic emphysematous changes. 3. Subacute to chronic bilateral rib fractures. Xr Chest Port    Result Date: 11/26/2018  EXAM:  XR CHEST PORT INDICATION:   SOB COMPARISON: Chest radiograph 8/24/2018. FINDINGS: AP radiograph of the chest was obtained. Hyperexpanded lungs are again noted. No evidence of focal consolidation. No pleural effusion or pneumothorax. Heart, burton, mediastinum are within normal limits. No acute osseous abnormalities. Subacute to chronic bilateral rib fractures. IMPRESSION: 1. No acute cardiopulmonary process. 2. Chronic emphysematous changes. 3. Subacute to chronic bilateral rib fractures.              MEDICATIONS       ALL MEDICATIONS  Current Facility-Administered Medications   Medication Dose Route Frequency    famotidine (PEPCID) tablet 20 mg  20 mg Oral BID    albuterol-ipratropium (DUO-NEB) 2.5 MG-0.5 MG/3 ML  3 mL Nebulization Q6H RT    chlorpheniramine-HYDROcodone (TUSSIONEX) oral suspension 5 mL  5 mL Oral Q12H PRN    predniSONE (DELTASONE) tablet 40 mg  40 mg Oral DAILY WITH BREAKFAST    QUEtiapine (SEROquel) tablet 25 mg  25 mg Oral QHS    LORazepam (ATIVAN) injection 1 mg  1 mg IntraVENous Q8H PRN    nicotine (NICODERM CQ) 14 mg/24 hr patch 1 Patch  1 Patch TransDERmal DAILY    acetaminophen (TYLENOL) tablet 650 mg  650 mg Oral Q4H PRN    haloperidol lactate (HALDOL) injection 5 mg  5 mg IntraVENous Q4H PRN    melatonin tablet 3 mg  3 mg Oral QHS    guaiFENesin ER (MUCINEX) tablet 600 mg  600 mg Oral Q12H    guaiFENesin-dextromethorphan (ROBITUSSIN DM) 100-10 mg/5 mL syrup 10 mL  10 mL Oral Q6H PRN    sodium chloride (NS) flush 5-10 mL  5-10 mL IntraVENous Q8H    sodium chloride (NS) flush 5-10 mL  5-10 mL IntraVENous PRN    ondansetron (ZOFRAN) injection 4 mg  4 mg IntraVENous Q4H PRN    enoxaparin (LOVENOX) injection 40 mg  40 mg SubCUTAneous Q24H    azithromycin (ZITHROMAX) 500 mg in 0.9% sodium chloride 250 mL ([id number])  500 mg IntraVENous Q24H    morphine injection 1 mg  1 mg IntraVENous Q4H PRN      SCHEDULED MEDICATIONS  Current Facility-Administered Medications   Medication Dose Route Frequency    famotidine (PEPCID) tablet 20 mg  20 mg Oral BID    albuterol-ipratropium (DUO-NEB) 2.5 MG-0.5 MG/3 ML  3 mL Nebulization Q6H RT    predniSONE (DELTASONE) tablet 40 mg  40 mg Oral DAILY WITH BREAKFAST    QUEtiapine (SEROquel) tablet 25 mg  25 mg Oral QHS    nicotine (NICODERM CQ) 14 mg/24 hr patch 1 Patch  1 Patch TransDERmal DAILY    melatonin tablet 3 mg  3 mg Oral QHS    guaiFENesin ER (MUCINEX) tablet 600 mg  600 mg Oral Q12H    sodium chloride (NS) flush 5-10 mL  5-10 mL IntraVENous Q8H    enoxaparin (LOVENOX) injection 40 mg  40 mg SubCUTAneous Q24H    azithromycin (ZITHROMAX) 500 mg in 0.9% sodium chloride 250 mL ([id number])  500 mg IntraVENous Q24H                ASSESSMENT & PLAN        The patient Kelvin Schilder is a 70 y.o.  male who presents at this time for treatment of the following diagnoses:  Patient Active Hospital Problem List:   delirium sec to general medical condition- etiology unknown. Assessment: pt is confused, disoriented and unable to give clear hx of his medical issue and tx    Plan: IMO pt currently lacks capacity to make medical decision. May consider ciwa to cover for any possible w/d/DTs    Disposition:   Thank you very much for the opportunity to participate in the care of your patient. I will continue to follow up with patient as deemed appropriate. I have reviewed admission (and previous/old) labs and medical tests in the EHR and or transferring hospital documents. I will continue to order blood tests/labs and diagnostic tests as deemed appropriate and review results as they become available (see orders for details). I have reviewed old psychiatric and medical records available in the EHR. I Will order additional psychiatric records from other institutions to further elucidate the nature of patient's psychopathology and review once available. I will gather additional collateral information from friends, family and o/p treatment team to further elucidate the nature of patient's psychopathology and baselline level of psychiatric functioning.                      SIGNED:    Yayo Cabrera MD  11/28/2018

## 2018-11-28 NOTE — PROGRESS NOTES
ADULT PROTOCOL: JET AEROSOL ASSESSMENT Patient  Maame Virk     70 y.o.   male     11/28/2018  8:09 AM 
 
Breath Sounds Pre Procedure: Right Breath Sounds: Wheezing Left Breath Sounds: Wheezing Breath Sounds Post Procedure: Right Breath Sounds: Wheezing Left Breath Sounds: Wheezing Breathing pattern: Pre procedure Breathing Pattern: Regular Post procedure Breathing Pattern: Regular Heart Rate: Pre procedure Pulse: 73 Post procedure Pulse: 69 Resp Rate: Pre procedure Respirations: 18 Post procedure Respirations: 18 
 
 
Cough: Pre procedure Cough: Congested, Non-productive Post procedure Cough: Non-productive Oxygen: 2L Sindy Watson Changed: NO SpO2: Pre procedure SpO2: 96 % Post procedure SpO2: 97 %  Nebulizer Therapy: Current medications Aerosolized Medications: DuoNeb Changed: NO 
 
} Problem List:  
Patient Active Problem List  
Diagnosis Code  [facility] pulmonary [facility] J[facility].1  Acute respiratory failure with hypoxia (HCC) J96.01  
 Pulmonary emphysema (HCC) J43.9  Multiple closed fractures of ribs of both sides with delayed healing S22.43XG  Respiratory distress R06.03  
 Tobacco abuse Z72.0  Anxiety state F41.1 Respiratory Therapist: Nevin Aldana, RT

## 2018-11-28 NOTE — PROGRESS NOTES
Problem: Pressure Injury - Risk of 
Goal: *Prevention of pressure injury Document Vito Scale and appropriate interventions in the flowsheet. Outcome: Progressing Towards Goal 
Pressure Injury Interventions: 
Sensory Interventions: Assess changes in LOC Moisture Interventions: Absorbent underpads, Apply protective barrier, creams and emollients Activity Interventions: Assess need for specialty bed, Increase time out of bed Mobility Interventions: Float heels, Chair cushion Nutrition Interventions: Document food/fluid/supplement intake Friction and Shear Interventions: Lift sheet, HOB 30 degrees or less

## 2018-11-28 NOTE — PROGRESS NOTES
Problem: Falls - Risk of 
Goal: *Absence of Falls Document April Josiah Fall Risk and appropriate interventions in the flowsheet. Outcome: Progressing Towards Goal 
Fall Risk Interventions: 
Mobility Interventions: Patient to call before getting OOB Mentation Interventions: Bed/chair exit alarm Medication Interventions: Bed/chair exit alarm Elimination Interventions: Urinal in reach, Bed/chair exit alarm History of Falls Interventions: Bed/chair exit alarm

## 2018-11-28 NOTE — PROGRESS NOTES
7: 40 AM ua with reflex ordered for frequenc/urgency. MD made aware of patients impulsive behavior, possible CIWA? Drug screen ordered as well.

## 2018-11-28 NOTE — PROGRESS NOTES
PCU SHIFT NURSING NOTE Bedside and Verbal shift change report given to Kevin Barillas RN (oncoming nurse) by Maria Esther Raya RN (offgoing nurse). Report included the following information SBAR, Kardex, MAR and Recent Results. Shift Summary:  
80: Spoke with Dr. Tiera Miller because patient is becoming agitated and more confused than he was at the beginning of my shift. Orders Received, will continue to monitor. 0010: Patient is having auditory hallucinations and is very confused and agitated. When talking to patient he said \"alcohol is what caused all this\" and I said \"do you drink alcohol at home? \" patient stated \"I haven't since August but that's what ended me up here. I was in critical care in August because I was drinking and fell out of a plastic chair and broke my ribs. I didn't remember anything for 4 days. \" I then asked the patient when his last drink was and he said \"August 24th I don't drink anymore because it caused me so much trouble. \" Patient is now starting to have hand tremors but his toxicology screen was negative on admission 46: Spoke with Dr. Tiera Miller because patient is continuing to climb out of bed despite being told to call us when he needs to use the bathroom. Explained concern about possible alcohol withdrawal and she said she would pass that on to Dr. Sosa Adjutant so that it can be evaluated in the morning. Received orders, will continue to monitor closely. 0725: Bedside and Verbal shift change report given to Xiomara Loo RN (oncoming nurse) by Kevin Barillas RN (offgoing nurse). Report included the following information SBAR, Kardex, MAR and Recent Results. Admission Date 11/26/2018 Admission Diagnosis Acute exacerbation of chronic obstructive pulmonary disease (COPD) ([facility] [address] Acute respiratory failure with hypoxemia (HCC) Consults None Consults []PT []OT []Speech  
[]Case Management  
  
[] Palliative Cardiac Monitoring Order []Yes []No  
 
IV drips []Yes Drip:                            Dose: 
Drip:                            Dose: 
Drip:                            Dose:  
[]No  
 
GI Prophylaxis []Yes []No  
 
 
 
DVT Prophylaxis SCDs:     
     
 Nba stockings:     
  
[] Medication []Contraindicated []None Activity Level Activity Level: Up with Assistance Activity Assistance: Partial (one person) Purposeful Rounding every 1-2 hour? []Yes Zavala Score  Total Score: 3 Bed Alarm (If score 3 or >) []Yes  
[] Refused (See signed refusal form in chart) Vito Score  Vito Score: 21 Vito Score (if score 14 or less) []PMT consult  
[]Wound Care consult []Specialty bed  
[] Nutrition consult Needs prior to discharge:  
Home O2 required:   
[]Yes []No  
 If yes, how much O2 required? Other:  
 Last Bowel Movement:    
  
Influenza Vaccine Pneumonia Vaccine Diet Active Orders Diet DIET REGULAR  
  
LDAs Peripheral IV 11/27/18 Right Hand (Active) Site Assessment Clean, dry, & intact 11/27/2018  6:04 PM  
Phlebitis Assessment 0 11/27/2018  6:04 PM  
Infiltration Assessment 0 11/27/2018  6:04 PM  
Dressing Status Clean, dry, & intact 11/27/2018  6:04 PM  
Dressing Type Transparent 11/27/2018  6:04 PM  
Hub Color/Line Status Blue;Capped;Flushed 11/27/2018  6:04 PM  
                  
Urinary Catheter Intake & Output Date 11/27/18 0700 - 11/28/18 8868 11/28/18 0700 - 11/29/18 5865 Shift 3412-6962 9145-1637 24 Hour Total 6273-9360 6242-5479 24 Hour Total  
INTAKE Shift Total(mL/kg) OUTPUT Urine(mL/kg/hr)  175 175 Urine Voided  175 175 Urine Occurrence(s)  2 x 2 x Shift Total(mL/kg)  175(2.2) 175(2.2) NET  -175 -175 Weight (kg) 79 79 79 79 79 79 Readmission Risk Assessment Tool Score Low Risk 8 Total Score   
  
 5 Pt. Coverage (Medicare=5 , Medicaid, or Self-Pay=4) 3 Charlson Comorbidity Score (Age + Comorbid Conditions) Criteria that do not apply:  
 Has Seen PCP in Last 6 Months (Yes=3, No=0) . Living with Significant Other. Assisted Living. LTAC. SNF. or  
Rehab Patient Length of Stay (>5 days = 3) IP Visits Last 12 Months (1-3=4, 4=9, >4=11) Expected Length of Stay 3d 19h Actual Length of Stay 2

## 2018-11-28 NOTE — PROGRESS NOTES
Hospitalist Progress Note NAME: Herbert Chamorro :  1955 MRN:  [id number] Assessment / Plan: 
Acute Respiratory Failure with Hypoxia POA c/w supplemental O2, wean down as tolerated. So far tolerating NC Acute COPD exacerbation POA c/w Z-max, bronchodilators, mucolytics, steroids switch to PO, monitor. H/o Chronic Emphysema from Tobacco Abuse  Still smoking found smoking in the room in the ER, counseled H/o Recent Falls with subsequent Bilateral Rib Fractures- Subacute CXR no acute ASD, Chronic emphysema noted, Subacute bilateral Rib Fractures noted Initial ABG= 1.35/47/146/Bicarb 25, 99% on 40% FIO2 on BIPAP Bipolar Ds? ?: will ask for Psychiatry evaluation and also for capacity Code Status: Full Surrogate Decision Maker: daughter Marline Salazar DVT Prophylaxis: SQ lovenox GI Prophylaxis: IV pepcid for now Baseline: Pt is independent with ADLs at baseline, not on oxygen at home, is a smoker but is cutting down on his cigarettes & has been down 1 pack every 3 days now Subjective: Chief Complaint / Reason for Physician Visit \"I feel better\". Discussed with RN events overnight. Review of Systems: 
Symptom Y/N Comments  Symptom Y/N Comments Fever/Chills    Chest Pain Poor Appetite    Edema Cough y   Abdominal Pain Sputum    Joint Pain SOB/KO y   Pruritis/Rash Nausea/vomit    Tolerating PT/OT Diarrhea    Tolerating Diet y Constipation    Other Could NOT obtain due to:   
 
Objective: VITALS:  
Last 24hrs VS reviewed since prior progress note. Most recent are: 
Patient Vitals for the past 24 hrs: 
 Temp Pulse Resp BP SpO2  
18 1102 97.6 °F (36.4 °C) 88 18 95/54 96 % 18 0753     96 % 18 0719 97.9 °F (36.6 °C) 84 20 135/65 98 % 18 0349 98 °F (36.7 °C) 87 21 142/90 100 % 18 0016     97 % 18 2345 98.4 °F (36.9 °C) (!) 104 18 (!) 121/98 97 % 18 2014 97.8 °F (36.6 °C) 96 16 119/72 98 % 11/27/18 1853 97.4 °F (36.3 °C) 95 24 (!) 127/92 95 % 11/27/18 1500 98.2 °F (36.8 °C) 84 13 98/64 97 % 11/27/18 1200  100 21 110/68 100 % Intake/Output Summary (Last 24 hours) at 11/28/2018 1113 Last data filed at 11/28/2018 1038 Gross per 24 hour Intake 3627.5 ml Output 1750 ml Net 1877.5 ml PHYSICAL EXAM: 
General: WD, WN. Alert, cooperative, no acute distress   
EENT:  EOMI. Anicteric sclerae. MMM Resp:  Coarse BS, very  mild wheezing. CV:  Regular  rhythm,  No edema GI:  Soft, Non distended, Non tender.  +Bowel sounds Neurologic:  Alert and oriented X 3, normal speech, Psych:   Good insight. Not anxious nor agitated Skin:  No rashes. No jaundice Reviewed most current lab test results and cultures  YES Reviewed most current radiology test results   YES Review and summation of old records today    NO Reviewed patient's current orders and MAR    YES 
PMH/SH reviewed - no change compared to H&P 
________________________________________________________________________ Care Plan discussed with: 
  Comments Patient y Family RN y   
Care Manager Consultant Multidiciplinary team rounds were held today with , nursing, pharmacist and clinical coordinator. Patient's plan of care was discussed; medications were reviewed and discharge planning was addressed. ________________________________________________________________________ Total NON critical care TIME:  35   Minutes Total CRITICAL CARE TIME Spent:   Minutes non procedure based Comments >50% of visit spent in counseling and coordination of care y   
________________________________________________________________________ Eneida Orr MD  
 
Procedures: see electronic medical records for all procedures/Xrays and details which were not copied into this note but were reviewed prior to creation of Plan.    
 
LABS: 
 I reviewed today's most current labs and imaging studies. Pertinent labs include: 
Recent Labs  
  11/28/18 
0351 11/27/18 0252 11/26/18 
1431 WBC 11.9* 4.6 9.0 HGB 13.0 14.1 15.4 HCT 37.8 41.5 45.3  223 287 Recent Labs  
  11/28/18 0351 11/27/18 0252 11/26/18 
1431  138 141  
K 4.2 4.2 4.1  107 108 CO2 23 26 24 * 152* 114* BUN 9 8 7 CREA 0.84 1.01 1.07  
CA 9.1 8.8 9.1 MG 2.0  --   --   
ALB 3.7  --  4.0 TBILI 0.4  --  0.5 SGOT 19  --  13* ALT 16  --  15 Signed: Baylee Seymour MD

## 2018-11-28 NOTE — PROGRESS NOTES
Spiritual Care Partner Volunteer visited patient in PCU on November 28. 2018 Documented by: 
 
 
NICHOLE Rice, [address],  [facility]  Paging Service  [phone]

## 2018-11-29 LAB
ALBUMIN SERPL-MCNC: 3.3 G/DL (ref 3.5–5)
ALBUMIN/GLOB SERPL: 1.2 {RATIO} (ref 1.1–2.2)
ALP SERPL-CCNC: 45 U/L (ref 45–117)
ALT SERPL-CCNC: 17 U/L (ref 12–78)
ANION GAP SERPL CALC-SCNC: 4 MMOL/L (ref 5–15)
AST SERPL-CCNC: 21 U/L (ref 15–37)
BASOPHILS # BLD: 0 K/UL (ref 0–0.1)
BASOPHILS NFR BLD: 0 % (ref 0–1)
BILIRUB SERPL-MCNC: 0.4 MG/DL (ref 0.2–1)
BUN SERPL-MCNC: 14 MG/DL (ref 6–20)
BUN/CREAT SERPL: 18 (ref 12–20)
CALCIUM SERPL-MCNC: 8.9 MG/DL (ref 8.5–10.1)
CHLORIDE SERPL-SCNC: 110 MMOL/L (ref 97–108)
CO2 SERPL-SCNC: 28 MMOL/L (ref 21–32)
CREAT SERPL-MCNC: 0.79 MG/DL (ref 0.7–1.3)
DIFFERENTIAL METHOD BLD: ABNORMAL
EOSINOPHIL # BLD: 0 K/UL (ref 0–0.4)
EOSINOPHIL NFR BLD: 0 % (ref 0–7)
ERYTHROCYTE [DISTWIDTH] IN BLOOD BY AUTOMATED COUNT: 12.3 % (ref 11.5–14.5)
GLOBULIN SER CALC-MCNC: 2.7 G/DL (ref 2–4)
GLUCOSE SERPL-MCNC: 112 MG/DL (ref 65–100)
HCT VFR BLD AUTO: 34.8 % (ref 36.6–50.3)
HGB BLD-MCNC: 11.9 G/DL (ref 12.1–17)
IMM GRANULOCYTES # BLD: 0 K/UL (ref 0–0.04)
IMM GRANULOCYTES NFR BLD AUTO: 0 % (ref 0–0.5)
LYMPHOCYTES # BLD: 0.9 K/UL (ref 0.8–3.5)
LYMPHOCYTES NFR BLD: 13 % (ref 12–49)
MAGNESIUM SERPL-MCNC: 2.3 MG/DL (ref 1.6–2.4)
MCH RBC QN AUTO: 32.3 PG (ref 26–34)
MCHC RBC AUTO-ENTMCNC: 34.2 G/DL (ref 30–36.5)
MCV RBC AUTO: 94.6 FL (ref 80–99)
MONOCYTES # BLD: 0.5 K/UL (ref 0–1)
MONOCYTES NFR BLD: 7 % (ref 5–13)
NEUTS SEG # BLD: 5.8 K/UL (ref 1.8–8)
NEUTS SEG NFR BLD: 80 % (ref 32–75)
NRBC # BLD: 0 K/UL (ref 0–0.01)
NRBC BLD-RTO: 0 PER 100 WBC
PLATELET # BLD AUTO: 193 K/UL (ref 150–400)
PMV BLD AUTO: 9.3 FL (ref 8.9–12.9)
POTASSIUM SERPL-SCNC: 4.7 MMOL/L (ref 3.5–5.1)
PROT SERPL-MCNC: 6 G/DL (ref 6.4–8.2)
RBC # BLD AUTO: 3.68 M/UL (ref 4.1–5.7)
SODIUM SERPL-SCNC: 142 MMOL/L (ref 136–145)
WBC # BLD AUTO: 7.2 K/UL (ref 4.1–11.1)

## 2018-11-29 PROCEDURE — 74011250636 HC RX REV CODE- 250/636: Performed by: INTERNAL MEDICINE

## 2018-11-29 PROCEDURE — 77010033678 HC OXYGEN DAILY

## 2018-11-29 PROCEDURE — 65660000000 HC RM CCU STEPDOWN

## 2018-11-29 PROCEDURE — 83735 ASSAY OF MAGNESIUM: CPT

## 2018-11-29 PROCEDURE — G8988 SELF CARE GOAL STATUS: HCPCS | Performed by: OCCUPATIONAL THERAPIST

## 2018-11-29 PROCEDURE — 94640 AIRWAY INHALATION TREATMENT: CPT

## 2018-11-29 PROCEDURE — 74011636637 HC RX REV CODE- 636/637: Performed by: INTERNAL MEDICINE

## 2018-11-29 PROCEDURE — 85025 COMPLETE CBC W/AUTO DIFF WBC: CPT

## 2018-11-29 PROCEDURE — 80053 COMPREHEN METABOLIC PANEL: CPT

## 2018-11-29 PROCEDURE — 74011250637 HC RX REV CODE- 250/637: Performed by: INTERNAL MEDICINE

## 2018-11-29 PROCEDURE — 74011000250 HC RX REV CODE- 250: Performed by: INTERNAL MEDICINE

## 2018-11-29 PROCEDURE — 36415 COLL VENOUS BLD VENIPUNCTURE: CPT

## 2018-11-29 PROCEDURE — 97165 OT EVAL LOW COMPLEX 30 MIN: CPT | Performed by: OCCUPATIONAL THERAPIST

## 2018-11-29 PROCEDURE — 97535 SELF CARE MNGMENT TRAINING: CPT | Performed by: OCCUPATIONAL THERAPIST

## 2018-11-29 PROCEDURE — G8987 SELF CARE CURRENT STATUS: HCPCS | Performed by: OCCUPATIONAL THERAPIST

## 2018-11-29 PROCEDURE — 74011250637 HC RX REV CODE- 250/637: Performed by: NURSE PRACTITIONER

## 2018-11-29 RX ORDER — FOLIC ACID 1 MG/1
1 TABLET ORAL DAILY
Status: DISCONTINUED | OUTPATIENT
Start: 2018-11-29 | End: 2018-12-03 | Stop reason: HOSPADM

## 2018-11-29 RX ORDER — QUETIAPINE FUMARATE 25 MG/1
25 TABLET, FILM COATED ORAL
Status: DISCONTINUED | OUTPATIENT
Start: 2018-11-29 | End: 2018-11-30

## 2018-11-29 RX ORDER — THERA TABS 400 MCG
1 TAB ORAL DAILY
Status: DISCONTINUED | OUTPATIENT
Start: 2018-11-29 | End: 2018-12-03 | Stop reason: HOSPADM

## 2018-11-29 RX ORDER — ASPIRIN 325 MG/1
100 TABLET, FILM COATED ORAL DAILY
Status: DISCONTINUED | OUTPATIENT
Start: 2018-11-29 | End: 2018-12-03 | Stop reason: HOSPADM

## 2018-11-29 RX ADMIN — IPRATROPIUM BROMIDE AND ALBUTEROL SULFATE 3 ML: .5; 3 SOLUTION RESPIRATORY (INHALATION) at 13:37

## 2018-11-29 RX ADMIN — LORAZEPAM 2 MG: 2 INJECTION INTRAMUSCULAR; INTRAVENOUS at 10:41

## 2018-11-29 RX ADMIN — MELATONIN TAB 3 MG 3 MG: 3 TAB at 21:16

## 2018-11-29 RX ADMIN — FOLIC ACID 1 MG: 1 TABLET ORAL at 14:02

## 2018-11-29 RX ADMIN — GUAIFENESIN 600 MG: 600 TABLET, EXTENDED RELEASE ORAL at 21:16

## 2018-11-29 RX ADMIN — ENOXAPARIN SODIUM 40 MG: 40 INJECTION, SOLUTION INTRAVENOUS; SUBCUTANEOUS at 21:16

## 2018-11-29 RX ADMIN — IPRATROPIUM BROMIDE AND ALBUTEROL SULFATE 3 ML: .5; 3 SOLUTION RESPIRATORY (INHALATION) at 21:23

## 2018-11-29 RX ADMIN — GUAIFENESIN 600 MG: 600 TABLET, EXTENDED RELEASE ORAL at 10:40

## 2018-11-29 RX ADMIN — Medication 10 ML: at 14:03

## 2018-11-29 RX ADMIN — AZITHROMYCIN MONOHYDRATE 500 MG: 500 INJECTION, POWDER, LYOPHILIZED, FOR SOLUTION INTRAVENOUS at 18:48

## 2018-11-29 RX ADMIN — LORAZEPAM 4 MG: 2 INJECTION INTRAMUSCULAR; INTRAVENOUS at 02:38

## 2018-11-29 RX ADMIN — Medication 10 ML: at 21:17

## 2018-11-29 RX ADMIN — Medication 100 MG: at 14:02

## 2018-11-29 RX ADMIN — IPRATROPIUM BROMIDE AND ALBUTEROL SULFATE 3 ML: .5; 3 SOLUTION RESPIRATORY (INHALATION) at 09:00

## 2018-11-29 RX ADMIN — LORAZEPAM 1 MG: 2 INJECTION INTRAMUSCULAR; INTRAVENOUS at 21:18

## 2018-11-29 RX ADMIN — FAMOTIDINE 20 MG: 20 TABLET ORAL at 10:40

## 2018-11-29 RX ADMIN — THERA TABS 1 TABLET: TAB at 14:02

## 2018-11-29 RX ADMIN — LORAZEPAM 2 MG: 2 INJECTION INTRAMUSCULAR; INTRAVENOUS at 06:11

## 2018-11-29 RX ADMIN — PREDNISONE 40 MG: 20 TABLET ORAL at 10:40

## 2018-11-29 RX ADMIN — Medication 10 ML: at 05:21

## 2018-11-29 NOTE — PROGRESS NOTES
Pt has been assigned a bed on a telemetry unit. Called and spoke with Dr. Molina Mendez since order for transfer was placed yesterday. MD wants pt to remain on PCU due to change in condition. THis nurse call and informed bed placement.

## 2018-11-29 NOTE — PROGRESS NOTES
ADULT PROTOCOL: JET AEROSOL  REASSESSMENT Patient  Prasanna Fagan     70 y.o.   male     11/29/2018  10:26 AM 
 
Breath Sounds Pre Procedure: Right Breath Sounds: Scattered wheezing Left Breath Sounds: Diminished Breath Sounds Post Procedure: Right Breath Sounds: Wheezing Left Breath Sounds: Wheezing Breathing pattern: Pre procedure Breathing Pattern: Tachypneic Post procedure Breathing Pattern: Regular Heart Rate: Pre procedure Pulse: 108 Post procedure Pulse: 62 Resp Rate: Pre procedure Respirations: 30 
         Post procedure Respirations: 16 
 
     
 
Cough: Pre procedure Cough: Congested, Non-productive Post procedure Cough: Non-productive Oxygen: O2 Device: Nasal cannula   2L SpO2: Pre procedure SpO2: 93 % Post procedure SpO2: 91 % Nebulizer Therapy: Current medications Aerosolized Medications: DuoNeb Smoking History:  Current Problem List:  
Patient Active Problem List  
Diagnosis Code  [facility] J44.1  Acute respiratory failure with hypoxia (HCC) J96.01  
 Pulmonary emphysema (HCC) J43.9  Multiple closed fractures of ribs of both sides with delayed healing S22.43XG  Respiratory distress R06.03  
 Tobacco abuse Z72.0  Anxiety state F41.1 Respiratory Therapist: Davida Sylvester V RT

## 2018-11-29 NOTE — PROGRESS NOTES
Hospitalist Progress Note NAME: Mauro Ricardo :  1955 MRN:  [id number] Assessment / Plan: 
Acute Respiratory Failure with Hypoxia POA c/w supplemental O2, wean down as tolerated. So far tolerating NC, lungs sound better Acute COPD exacerbation POA c/w Z-max, bronchodilators, mucolytics, steroids switched to PO, monitor. H/o Chronic Emphysema from Tobacco Abuse  Still smoking found smoking in the room in the ER, counseled H/o Recent Falls with subsequent Bilateral Rib Fractures- Subacute CXR no acute ASD, Chronic emphysema noted, Subacute bilateral Rib Fractures noted Initial ABG= 1.35/47/146/Bicarb 25, 99% on 40% FIO2 on BIPAP Bipolar Ds? ?: will ask for Psychiatry evaluation and also for capacity, apparently Psychiatry saw patient yesterday, no note in the chart Encephalopathy: advised for CIWA yesterday, patient now fully sedated, will c/w  CIWA, but limit ativan use Code Status: Full Surrogate Decision Maker: daughter MILKA [id number] DVT Prophylaxis: SQ lovenox GI Prophylaxis: IV pepcid for now Baseline: Pt is independent with ADLs at baseline, not on oxygen at home, is a smoker but is cutting down on his cigarettes & has been down 1 pack every 3 days now Subjective: Chief Complaint / Reason for Physician Visit Sedated, unable to provide a meaningful history. Discussed with RN events overnight. Review of Systems: 
Symptom Y/N Comments  Symptom Y/N Comments Fever/Chills    Chest Pain Poor Appetite    Edema Cough y   Abdominal Pain Sputum    Joint Pain SOB/KO y   Pruritis/Rash Nausea/vomit    Tolerating PT/OT Diarrhea    Tolerating Diet y Constipation    Other Could NOT obtain due to: sedated Objective: VITALS:  
Last 24hrs VS reviewed since prior progress note. Most recent are: 
Patient Vitals for the past 24 hrs: 
 Temp Pulse Resp BP SpO2  
18 1055 97.5 °F (36.4 °C) 61 18 105/52 92 % 11/29/18 0901     93 % 11/29/18 0801 98 °F (36.7 °C) 71 18 111/68 100 % 11/29/18 0241 98 °F (36.7 °C) (!) 56 18 106/58 99 % 11/28/18 2257 98 °F (36.7 °C) (!) 52 18 108/50 96 % 11/28/18 1852 98.2 °F (36.8 °C) 77 18 106/60 94 % 11/28/18 1527 98 °F (36.7 °C) (!) 110 18 102/85 98 % 11/28/18 1428     97 % Intake/Output Summary (Last 24 hours) at 11/29/2018 1148 Last data filed at 11/28/2018 2257 Gross per 24 hour Intake 240 ml Output 1220 ml Net -980 ml PHYSICAL EXAM: 
General: WD, WN. sedated, no acute distress   
EENT:  EOMI. Anicteric sclerae. MMM Resp:  Coarse BS, rhonchi CV:  Regular  rhythm,  No edema GI:  Soft, Non distended, Non tender.  +Bowel sounds Neurologic:  Alert and oriented X 0, nospeech, Psych:   Poor  insight. Not anxious nor agitated Skin:  No rashes. No jaundice Reviewed most current lab test results and cultures  YES Reviewed most current radiology test results   YES Review and summation of old records today    NO Reviewed patient's current orders and MAR    YES 
PMH/ reviewed - no change compared to H&P 
________________________________________________________________________ Care Plan discussed with: 
  Comments Patient y Family RN y   
Care Manager Consultant Multidiciplinary team rounds were held today with , nursing, pharmacist and clinical coordinator. Patient's plan of care was discussed; medications were reviewed and discharge planning was addressed. ________________________________________________________________________ Total NON critical care TIME:  35   Minutes Total CRITICAL CARE TIME Spent:   Minutes non procedure based Comments >50% of visit spent in counseling and coordination of care y   
________________________________________________________________________ Awais Mejia MD  
 
Procedures: see electronic medical records for all procedures/Xrays and details which were not copied into this note but were reviewed prior to creation of Plan. LABS: 
I reviewed today's most current labs and imaging studies. Pertinent labs include: 
Recent Labs  
  11/29/18 
0135 11/28/18 
0351 11/27/18 
0252 WBC 7.2 11.9* 4.6 HGB 11.9* 13.0 14.1 HCT 34.8* 37.8 41.5  225 223 Recent Labs  
  11/29/18 
0135 11/28/18 
0351 11/27/18 
0252 11/26/18 
1431  139 138 141  
K 4.7 4.2 4.2 4.1 * 108 107 108 CO2 28 23 26 24 * 122* 152* 114* BUN 14 9 8 7 CREA 0.79 0.84 1.01 1.07  
CA 8.9 9.1 8.8 9.1 MG 2.3 2.0  --   --   
ALB 3.3* 3.7  --  4.0 TBILI 0.4 0.4  --  0.5 SGOT 21 19  --  13* ALT 17 16  --  15 Signed: Zohreh Kolb MD

## 2018-11-29 NOTE — PROGRESS NOTES
Bedside and Verbal shift change report given to Petrona Puente RN (oncoming nurse) by Jack Oakley RN (offgoing nurse). Report included the following information SBAR, Kardex, ED Summary, Procedure Summary, Intake/Output, MAR, Recent Results, Med Rec Status and Cardiac Rhythm Sinus Ramírez Holiday. 1215- Called and spoke to Dr Hansen Ask consult and asked to put in a note for the consult yesterday. Informed him that the hospitalist was looking to see if patient has capacity. Dr Digna Richardson said he would put in a note shortly. [phone]- Placed call to Dr Miri uV to inform that Dtdustin Garner is at the bedside. 1905-  Pt refusing evening meds (see MAR). Pt is cussing and saying \" I do not want any damn pills and I am not taking them. \"  Report given to Josué Rodriguez RN. She will retry to administer pills when the patient calms down.

## 2018-11-29 NOTE — PROGRESS NOTES
PCU SHIFT NURSING NOTE Bedside and Verbal shift change report given to Kait Denton RN (oncoming nurse) by Shirin Vera (offgoing nurse). Report included the following information SBAR, Kardex, Intake/Output, MAR, Recent Results and Cardiac Rhythm NSR-ST. Shift Summary: Received pt lying in bed with no noted acute distress. See flowcharting for full assessment. Will monitor. 2020 pt became agitated after resp therapy put breathig tx mask on and threw it off stating he was getting out of here. Called for assistance and another RN called a code atlas, as could not control pt. Admin Haldol 5mg IV. After haldol took affect, pt got back in bed with assistance. Telesitter on. Will monitor. 2104 pt agitation continues and trying to leave hospital. Admin Ativan 2mg IV. 
 
2255 pt very agitated and continually trying to get oob and leave. Admin ativan 4mg IV 
 
0238 pt very agitated and continually trying to get oob and leave and trying to scratch my face and twist right wrist. Admin ativan 4mg IV 
 
0300 pt still agitated and trying to get oob and having to constantly stop pt hurting self and leaving. Slim SMITHA came to sit with pt due to severe agitation and aggressiveness. 0425 pt incont of urine to bed. Pt cleaned and changed per JOSH Aguirre. 
 
0792 pt agitated again with same actions of trying to get OOB and leave. Resisting every nursing action to calm pt. Admin Ativan 2mg IV instead of 4mg as 4mg causes HR to drop to 40's when pt falls asleep, as long as he is awake HR is in 80's. Admission Date 11/26/2018 Admission Diagnosis Acute exacerbation of chronic obstructive pulmonary disease (COPD) ([facility] [address] Acute respiratory failure with hypoxemia (HCC) Consults IP CONSULT TO PSYCHIATRY Consults []PT []OT []Speech  
[]Case Management  
  
[] Palliative Cardiac Monitoring Order []Yes []No  
 
IV drips []Yes Drip:                            Dose: 
 Drip:                            Dose: 
Drip:                            Dose:  
[]No  
 
GI Prophylaxis []Yes []No  
 
 
 
DVT Prophylaxis SCDs:     
     
 Nba stockings:     
  
[] Medication []Contraindicated []None Activity Level Activity Level: Up with Assistance Activity Assistance: Partial (one person) Purposeful Rounding every 1-2 hour? []Yes Zavala Score  Total Score: 5 Bed Alarm (If score 3 or >) []Yes  
[] Refused (See signed refusal form in chart) Vito Score  Vito Score: 18 Vito Score (if score 14 or less) []PMT consult  
[]Wound Care consult []Specialty bed  
[] Nutrition consult Needs prior to discharge:  
Home O2 required:   
[]Yes []No  
 If yes, how much O2 required? Other:  
 Last Bowel Movement: Last Bowel Movement Date: 11/28/18 Influenza Vaccine Pneumonia Vaccine Diet Active Orders Diet DIET REGULAR  
  
LDAs Peripheral IV 11/27/18 Right Hand (Active) Site Assessment Clean, dry, & intact 11/28/2018  6:52 PM  
Phlebitis Assessment 0 11/28/2018  6:52 PM  
Infiltration Assessment 0 11/28/2018  6:52 PM  
Dressing Status Clean, dry, & intact 11/28/2018  6:52 PM  
Dressing Type Transparent 11/28/2018  6:52 PM  
Hub Color/Line Status Blue;Flushed 11/28/2018  6:52 PM  
Action Taken Open ports on tubing capped 11/28/2018  3:37 PM  
Alcohol Cap Used Yes 11/28/2018  6:52 PM  
                  
Urinary Catheter Intake & Output Date 11/27/18 1900 - 11/28/18 9530 11/28/18 0700 - 11/29/18 6200 Shift 9361-1859 24 Hour Total 3194-8546 7935-6533 24 Hour Total  
INTAKE  
P.O. 1300 1300 360  360  
  P. O. 1300 1300 360  360  
I. V.(mL/kg/hr) 2207.5 2207.5 Volume (0.9% sodium chloride infusion) 2207.5 2207.5 Shift Total(mL/kg) 2558. 5(44.4) [id number]. 5(44.4) 360(4.6)  360(4.6) OUTPUT Urine(mL/kg/hr) 1100 1100 1220(1.3)  1220 Urine Voided 1100 1100 1220  1220 Urine Occurrence(s) 2 x 2 x Stool Stool Occurrence(s)   2 x  2 x Shift Total(mL/kg) 1100(13.9) 1100(13.9) 1594(41.1)  5368(84.3) NET 2407.5 2407.5 -860  -860 Weight (kg) 79 79 79 79 79 Readmission Risk Assessment Tool Score Low Risk 8 Total Score   
  
 5 Pt. Coverage (Medicare=5 , Medicaid, or Self-Pay=4) 3 Charlson Comorbidity Score (Age + Comorbid Conditions) Criteria that do not apply:  
 Has Seen PCP in Last 6 Months (Yes=3, No=0) . Living with Significant Other. Assisted Living. LTAC. SNF. or  
Rehab Patient Length of Stay (>5 days = 3) IP Visits Last 12 Months (1-3=4, 4=9, >4=11) Expected Length of Stay 3d 19h Actual Length of Stay 2

## 2018-11-29 NOTE — PROGRESS NOTES
Occupational Therapy Goals Initiated 11/29/2018 1. Patient will perform grooming standing at sink with supervision/set-up within 7 day(s). 2.  Patient will perform upper body dressing with supervision/set-up within 7 day(s). 3.  Patient will perform lower body dressing with supervision/set-up within 7 day(s). 4.  Patient will perform toilet transfers with supervision/set-up within 7 day(s). 5.  Patient will perform all aspects of toileting with supervision/set-up within 7 day(s). Occupational Therapy EVALUATION Patient: Kristofer Espinoza (47 y.o. male) Date: 11/29/2018 Primary Diagnosis: Acute exacerbation of chronic obstructive pulmonary disease (COPD) ([facility] [address] Acute respiratory failure with hypoxemia (HCC) Precautions:   Fall ASSESSMENT : 
Based on the objective data described below, the patient presents with lethargy, confusion, GW, decreased activity tolerance, decreased safety awareness and decreased balance which is impairing his functional independence. Evaluation limited by lethargy, likely due to receiving Ativan this morning. He is functioning below his independent baseline, now max A to total A for ADLs and is min A for functional mobility. Patient will benefit from skilled intervention to address the above impairments, but should greatly improve as his confusion and lethargy subside. Patients rehabilitation potential is considered to be Good Factors which may influence rehabilitation potential include:  
[]             None noted []             Mental ability/status []             Medical condition []             Home/family situation and support systems []             Safety awareness []             Pain tolerance/management 
[]             Other: PLAN : 
Recommendations and Planned Interventions: 
[x]               Self Care Training                  []        Therapeutic Activities [x]               Functional Mobility Training    []        Cognitive Retraining []               Therapeutic Exercises           [x]        Endurance Activities [x]               Balance Training                   []        Neuromuscular Re-Education []               Visual/Perceptual Training     [x]   Home Safety Training 
[x]               Patient Education                 [x]        Family Training/Education []               Other (comment): Frequency/Duration: Patient will be followed by occupational therapy 3 times a week to address goals. Discharge Recommendations: To Be Determined, pending progress. Likely no needs Further Equipment Recommendations for Discharge: TBD OBJECTIVE DATA SUMMARY:  
 
Past Medical History:  
Diagnosis Date  Asthma  COPD (chronic obstructive pulmonary disease) (HCC) History reviewed. No pertinent surgical history. Prior Level of Function/Home Situation: As per PT evaluation:  PTA pt independent in amb without use of AD. Pt living at home with his roommate, who is also retired. Pt no longer drives, and relies on roommate for transportation. Pt reports being independent in ADLs including bathing and dressing. Pt reports one fall in the past year, related to ETOH which resulted in broken ribs in August. Pt reports he has not drank since this date, but is still currently a smoker. Expanded or extensive additional review of patient history:  
 
Home Situation Home Environment: Private residence # Steps to Enter: 4 Rails to Enter: Yes Hand Rails : Bilateral 
One/Two Story Residence: Two story, live on 1st floor Living Alone: No 
Support Systems: Friends \ neighbors Patient Expects to be Discharged to[de-identified] Private residence Current DME Used/Available at Home: None Tub or Shower Type: Tub/Shower combination 
[x]  Right hand dominant   []  Left hand dominantCognitive/Behavioral Status: 
Neurologic State: Confused;Drowsy Orientation Level: Disoriented to place; Disoriented to situation;Disoriented to time;Oriented to place Cognition: Appropriate decision making;Decreased command following; Impulsive;Poor safety awareness;Decreased attention/concentration Safety/Judgement: Lack of insight into deficits Vision/Perceptual:   
    
    
    
  
    
Acuity: Within Defined Limits Range of Motion: 
AROM: Generally decreased, functional 
  
Strength: 
Strength: Generally decreased, functional 
Coordination: 
Coordination: Grossly decreased, non-functional 
    
  
Balance: 
Sitting: Impaired Sitting - Static: Good (unsupported) Sitting - Dynamic: Fair (occasional) Standing: Impaired Standing - Static: Fair Standing - Dynamic : Poor Functional Mobility and Transfers for ADLs:Bed Mobility: 
Rolling: Minimum assistance Supine to Sit: Minimum assistance Sit to Supine: Minimum assistance Scooting: Minimum assistance Transfers: 
Sit to Stand: Minimum assistance Side stepped to [address] with Minimum assistance. ADL Assessment: 
Feeding: Maximum assistance Oral Facial Hygiene/Grooming: Maximum assistance Bathing: Maximum assistance Upper Body Dressing: Maximum assistance Lower Body Dressing: Maximum assistance Toileting: Total assistance Functional Measure: 
Barthel Index: 
 
Bathin Bladder: 0 Bowels: 0 Groomin Dressin Feedin Mobility: 0 Stairs: 0 Toilet Use: 0 Transfer (Bed to Chair and Back): 10 Total: 10 Barthel and G-code impairment scale: 
Percentage of impairment CH 
0% CI 
1-19% CJ 
20-39% CK 
40-59% CL 
60-79% CM 
80-99% CN 
100% Barthel Score 0-100 100 99-80 79-60 59-40 20-39 1-19 
 0 Barthel Score 0-20 20 17-19 13-16 9-12 5-8 1-4 0 The Barthel ADL Index: Guidelines 1. The index should be used as a record of what a patient does, not as a record of what a patient could do. 2. The main aim is to establish degree of independence from any help, physical or verbal, however minor and for whatever reason. 3. The need for supervision renders the patient not independent. 4. A patient's performance should be established using the best available evidence. Asking the patient, friends/relatives and nurses are the usual sources, but direct observation and common sense are also important. However direct testing is not needed. 5. Usually the patient's performance over the preceding 24-48 hours is important, but occasionally longer periods will be relevant. 6. Middle categories imply that the patient supplies over 50 per cent of the effort. 7. Use of aids to be independent is allowed. Arland Kocher., Barthel, D.W. (5631). Functional evaluation: the Barthel Index. [address] [facility] (14)2. [facility] joseph STUART Rivera, Sheyla Gill., Vasile Quintanilla., [place], [address] [facility] (1999). Measuring the change indisability after inpatient rehabilitation; comparison of the responsiveness of the Barthel Index and Functional [place] Measure. Journal of Neurology, Neurosurgery, and Psychiatry, 66(4), 632-436. Nicola Alexandra, N.J.A, CARLY Bocanegra, & Arminda Bear MKIRA. (2004.) Assessment of post-stroke quality of life in cost-effectiveness studies: The usefulness of the Barthel Index and the EuroQoL-5D. [facility], 13, 374-57 In compliance with CMSs Claims Based Outcome Reporting, the following G-code set was chosen for this patient based on their primary functional limitation being treated: The outcome measure chosen to determine the severity of the functional limitation was the Barthel Index with a score of 10/100 which was correlated with the impairment scale. ? Self Care:  
  - CURRENT STATUS: CM - 80%-99% impaired, limited or restricted  - GOAL STATUS:  CJ - 20%-39% impaired, limited or restricted  - D/C STATUS:  ---------------To be determined---------------  
 
ADL Intervention and task modifications: 
Initiated dressing ADL, bed mobility, balance and transfer training Pain: Pain Scale 1: Numeric (0 - 10) Pain Intensity 1: 0 Activity Tolerance: VSS After treatment:  
[x] Patient left in no apparent distress sitting up in chair 
[x] Patient left in no apparent distress in bed 
[x] Call bell left within reach [x] Nursing notified 
[] Caregiver present [x] Bed alarm activated COMMUNICATION/EDUCATION:  
The patients plan of care was discussed with: Occupational Therapist. 
[] Home safety education was provided and the patient/caregiver indicated understanding. [] Patient/family have participated as able in goal setting and plan of care. [] Patient/family agree to work toward stated goals and plan of care. [] Patient understands intent and goals of therapy, but is neutral about his/her participation. [x] Patient is unable to participate in goal setting and plan of care. This patients plan of care is appropriate for delegation to [facility]. Thank you for this referral. 
Soham Waters OTR/L Time Calculation: 25 mins

## 2018-11-30 ENCOUNTER — DOCUMENTATION ONLY (OUTPATIENT)
Dept: CASE MANAGEMENT | Age: 63
End: 2018-11-30

## 2018-11-30 PROCEDURE — 65660000000 HC RM CCU STEPDOWN

## 2018-11-30 PROCEDURE — 74011250636 HC RX REV CODE- 250/636: Performed by: INTERNAL MEDICINE

## 2018-11-30 PROCEDURE — 74011000250 HC RX REV CODE- 250: Performed by: INTERNAL MEDICINE

## 2018-11-30 PROCEDURE — 94640 AIRWAY INHALATION TREATMENT: CPT

## 2018-11-30 PROCEDURE — 74011250637 HC RX REV CODE- 250/637: Performed by: NURSE PRACTITIONER

## 2018-11-30 PROCEDURE — 74011250637 HC RX REV CODE- 250/637: Performed by: INTERNAL MEDICINE

## 2018-11-30 RX ORDER — PREDNISONE 20 MG/1
20 TABLET ORAL
Status: DISCONTINUED | OUTPATIENT
Start: 2018-12-01 | End: 2018-12-03 | Stop reason: HOSPADM

## 2018-11-30 RX ORDER — QUETIAPINE FUMARATE 25 MG/1
12.5 TABLET, FILM COATED ORAL
Status: DISCONTINUED | OUTPATIENT
Start: 2018-11-30 | End: 2018-11-30

## 2018-11-30 RX ORDER — POLYETHYLENE GLYCOL 3350 17 G/17G
17 POWDER, FOR SOLUTION ORAL DAILY
Status: DISCONTINUED | OUTPATIENT
Start: 2018-11-30 | End: 2018-12-03 | Stop reason: HOSPADM

## 2018-11-30 RX ADMIN — FAMOTIDINE 20 MG: 20 TABLET ORAL at 17:52

## 2018-11-30 RX ADMIN — Medication 10 ML: at 14:47

## 2018-11-30 RX ADMIN — AZITHROMYCIN MONOHYDRATE 500 MG: 500 INJECTION, POWDER, LYOPHILIZED, FOR SOLUTION INTRAVENOUS at 17:52

## 2018-11-30 RX ADMIN — MELATONIN TAB 3 MG 3 MG: 3 TAB at 21:11

## 2018-11-30 RX ADMIN — Medication 10 ML: at 05:02

## 2018-11-30 RX ADMIN — ENOXAPARIN SODIUM 40 MG: 40 INJECTION, SOLUTION INTRAVENOUS; SUBCUTANEOUS at 21:11

## 2018-11-30 RX ADMIN — POLYETHYLENE GLYCOL 3350 17 G: 17 POWDER, FOR SOLUTION ORAL at 13:47

## 2018-11-30 RX ADMIN — HYDROCODONE POLISTIREX AND CHLORPHENIRAMINE POLISTIREX 5 ML: 10; 8 SUSPENSION, EXTENDED RELEASE ORAL at 03:19

## 2018-11-30 RX ADMIN — ONDANSETRON 4 MG: 2 INJECTION INTRAMUSCULAR; INTRAVENOUS at 18:17

## 2018-11-30 RX ADMIN — Medication 10 ML: at 21:11

## 2018-11-30 RX ADMIN — GUAIFENESIN 600 MG: 600 TABLET, EXTENDED RELEASE ORAL at 21:11

## 2018-11-30 RX ADMIN — IPRATROPIUM BROMIDE AND ALBUTEROL SULFATE 3 ML: .5; 3 SOLUTION RESPIRATORY (INHALATION) at 07:42

## 2018-11-30 RX ADMIN — IPRATROPIUM BROMIDE AND ALBUTEROL SULFATE 3 ML: .5; 3 SOLUTION RESPIRATORY (INHALATION) at 14:13

## 2018-11-30 RX ADMIN — IPRATROPIUM BROMIDE AND ALBUTEROL SULFATE 3 ML: .5; 3 SOLUTION RESPIRATORY (INHALATION) at 20:24

## 2018-11-30 NOTE — PROGRESS NOTES
Hospitalist Progress Note NAME: Mariluz Head :  1955 MRN:  [id number] Assessment / Plan: 
Acute Respiratory Failure with Hypoxia POA c/w supplemental O2, wean down as tolerated. So far tolerating NC 1L, lungs sound better Acute COPD exacerbation POA c/w Z-max, bronchodilators, mucolytics, steroids switched to PO taper down, monitor. H/o Chronic Emphysema from Tobacco Abuse  Still smoking found smoking in the room in the ER, counseled H/o Recent Falls with subsequent Bilateral Rib Fractures- Subacute CXR no acute ASD, Chronic emphysema noted, Subacute bilateral Rib Fractures noted Initial ABG= 1.35/47/146/Bicarb 25, 99% on 40% FIO2 on BIPAP Bipolar Ds? ?: will ask for Psychiatry evaluation and also for capacity, apparently Psychiatry saw patient yesterday, no note in the chart Encephalopathy: seems close to baseline, monitor Code Status: Full Surrogate Decision Maker: daughter DVOXR  [id number] DVT Prophylaxis: SQ lovenox GI Prophylaxis: IV pepcid for now Baseline: Pt is independent with ADLs at baseline, not on oxygen at home, is a smoker but is cutting down on his cigarettes & has been down 1 pack every 3 days now Patient is very weak, will get PT/OT evaluation but will need placement Subjective: Chief Complaint / Reason for Physician Visit \"I feel better\"   Discussed with RN events overnight. Review of Systems: 
Symptom Y/N Comments  Symptom Y/N Comments Fever/Chills    Chest Pain Poor Appetite    Edema Cough y   Abdominal Pain Sputum    Joint Pain SOB/KO    Pruritis/Rash Nausea/vomit    Tolerating PT/OT Diarrhea    Tolerating Diet y Constipation    Other Could NOT obtain due to:   
 
Objective: VITALS:  
Last 24hrs VS reviewed since prior progress note. Most recent are: 
Patient Vitals for the past 24 hrs: 
 Temp Pulse Resp BP SpO2  
18 1142 98.1 °F (36.7 °C) 88 18 133/81 97 % 18 0743     96 % 11/30/18 0719 98.5 °F (36.9 °C) 84 18 115/71 94 % 11/30/18 0620  94   98 % 11/30/18 0619  100   97 % 11/30/18 0618  (!) 106   97 % 11/30/18 0617  (!) 121   97 % 11/30/18 0616  (!) 141   96 % 11/30/18 0615  (!) 152     
11/30/18 0614  (!) 139     
11/30/18 0613  (!) 137     
11/30/18 0612  (!) 142     
11/30/18 0611  (!) 115     
11/30/18 0610  (!) 106     
11/30/18 0609  (!) 106   94 % 11/30/18 0608  97   96 % 11/30/18 0254 97.8 °F (36.6 °C) 93 18 116/74 94 % 11/29/18 2308 98 °F (36.7 °C) 86 18 120/77 95 % 11/29/18 2122     97 % 11/29/18 1906 98.2 °F (36.8 °C) 72 18 109/70 95 % 11/29/18 1512 97.1 °F (36.2 °C) 80 18 109/67 96 % 11/29/18 1337     93 % Intake/Output Summary (Last 24 hours) at 11/30/2018 1149 Last data filed at 11/30/2018 6361 Gross per 24 hour Intake 120 ml Output 850 ml Net -730 ml PHYSICAL EXAM: 
General: WD, WN. Awake, cooperative, no acute distress   
EENT:  EOMI. Anicteric sclerae. MMM Resp:  Coarse BS, rhonchi CV:  Regular  rhythm,  No edema GI:  Soft, Non distended, Non tender.  +Bowel sounds Neurologic:  Alert and oriented X 2, normal speech, Psych:   Fair   insight. Not anxious nor agitated Skin:  No rashes. No jaundice Reviewed most current lab test results and cultures  YES Reviewed most current radiology test results   YES Review and summation of old records today    NO Reviewed patient's current orders and MAR    YES 
PMH/SH reviewed - no change compared to H&P 
________________________________________________________________________ Care Plan discussed with: 
  Comments Patient y Family  y IVONE Mora RN y   
Care Manager Consultant Multidiciplinary team rounds were held today with , nursing, pharmacist and clinical coordinator.   Patient's plan of care was discussed; medications were reviewed and discharge planning was addressed. ________________________________________________________________________ Total NON critical care TIME:  35   Minutes Total CRITICAL CARE TIME Spent:   Minutes non procedure based Comments >50% of visit spent in counseling and coordination of care y   
________________________________________________________________________ Bev Lund MD  
 
Procedures: see electronic medical records for all procedures/Xrays and details which were not copied into this note but were reviewed prior to creation of Plan. LABS: 
I reviewed today's most current labs and imaging studies. Pertinent labs include: 
Recent Labs  
  11/29/18 0135 11/28/18 0351 WBC 7.2 11.9* HGB 11.9* 13.0 HCT 34.8* 37.8  225 Recent Labs  
  11/29/18 0135 11/28/18 0351  139  
K 4.7 4.2 * 108 CO2 28 23 * 122* BUN 14 9 CREA 0.79 0.84 CA 8.9 9.1 MG 2.3 2.0 ALB 3.3* 3.7 TBILI 0.4 0.4 SGOT 21 19 ALT 17 16 Signed: Bev Lund MD

## 2018-11-30 NOTE — PROGRESS NOTES
PCU SHIFT NURSING NOTE Bedside and Verbal shift change report given to Khoi Brothers RN (oncoming nurse) by Rosemary Briones (offgoing nurse). Report included the following information SBAR, Kardex, Intake/Output, MAR, Recent Results and Cardiac Rhythm NSR - ST. Shift Summary: Received pt lying in bed with no noted distress. Day shift RN giving bedside report while at same time admin po meds but pt defiant and refusing to take and yelling leave me alone. Chance Romano states in report that dr Jhonatan Perez cancelled CIWA and CIWA ativan. Sitter at bedside alongwith telesitter. See flowcharting for full assessment. Will monitor. 2118  Due to resp therapy trying to apply breathing tx,  pt became very aglitated and combative, yelling and trying to get OOB and leave hospital.   Resp Therapist states pt was so combative that she was fearful of being hit. Admin Ativan 1mg IV. Pt still on 1L/NC, will try to wean pt to room air. 
 
0319 Pt with NPC and I asked him if he wanted cough med and pt stated yes. Admin Tussionex 5ml po. Sitter still at bedside due to pt impulsiveness and trying to get OOB. Pt is on Room Air and Sats are 94-95%. 0278 assisted pt to urinate despite him fighting. Pt had already worked himself up to having SOB. Sats on room air 95% and tried to apply oxygen to assist pt but he began screaming and said leave me alone and dont put that on. After 5 min, pts breathing calmed down somewhat.  
 
0545 pt arguing that he wants water and when given to him he argues that he does not want it. 1751 pt argued and insisted that he walk to bathroom. Assisted x 2, myself and sitter to toilet to urinate in urinal. Pts HR elevated to 150's and very SOB and it took about 10 min for pt to recover once back to bed. Admission Date 11/26/2018 Admission Diagnosis Acute exacerbation of chronic obstructive pulmonary disease (COPD) ([facility] [address] Acute respiratory failure with hypoxemia (HCC) Consults IP CONSULT TO PSYCHIATRY Consults []PT []OT []Speech  
[]Case Management  
  
[] Palliative Cardiac Monitoring Order []Yes []No  
 
IV drips []Yes Drip:                            Dose: 
Drip:                            Dose: 
Drip:                            Dose:  
[]No  
 
GI Prophylaxis []Yes []No  
 
 
 
DVT Prophylaxis SCDs:     
     
 Nba stockings:     
  
[] Medication []Contraindicated []None Activity Level Activity Level: Up with Assistance Activity Assistance: Partial (one person) Purposeful Rounding every 1-2 hour? []Yes Zavala Score  Total Score: 5 Bed Alarm (If score 3 or >) []Yes  
[] Refused (See signed refusal form in chart) Vito Score  Vito Score: 18 Vito Score (if score 14 or less) []PMT consult  
[]Wound Care consult []Specialty bed  
[] Nutrition consult Needs prior to discharge:  
Home O2 required:   
[]Yes []No  
 If yes, how much O2 required? Other:  
 Last Bowel Movement: Last Bowel Movement Date: 11/28/18 Influenza Vaccine Received Flu Vaccine for Current Season (usually Sept-March): Yes Pneumonia Vaccine Diet Active Orders Diet DIET REGULAR  
  
LDAs Peripheral IV 11/29/18 Anterior;Right;Superior; Upper Arm (Active) Site Assessment Clean, dry, & intact 11/29/2018  7:06 PM  
Phlebitis Assessment 0 11/29/2018  7:06 PM  
Infiltration Assessment 0 11/29/2018  7:06 PM  
Dressing Status Clean, dry, & intact 11/29/2018  7:06 PM  
Dressing Type Transparent 11/29/2018  7:06 PM  
Hub Color/Line Status Pink;Flushed 11/29/2018  7:06 PM  
Alcohol Cap Used Yes 11/29/2018  7:06 PM  
                  
Urinary Catheter Intake & Output Date 11/28/18 1900 - 11/29/18 0659 11/29/18 0700 - 11/30/18 8762 Shift 4498-1453 24 Hour Total 8109-3026 3014-5895 24 Hour Total  
INTAKE  
P.O.  360 180  180  
  P. O.  360 180  180 Shift Total(mL/kg)  360(5.5) 180(2.8)  180(2.8) OUTPUT Urine(mL/kg/hr) 350 1870 300(0.4)  300 Urine Voided 350 1870 300  300 Urine Occurrence(s) 1 x 1 x Stool Stool Occurrence(s)  2 x Shift Total(mL/kg) 350(5.4) 1870(28.6) 300(4.6)  300(4.6) NET -350 -1510 -120  -120 Weight (kg) 65.4 65.4 65.4 65.4 65.4 Readmission Risk Assessment Tool Score Low Risk 8 Total Score   
  
 5 Pt. Coverage (Medicare=5 , Medicaid, or Self-Pay=4) 3 Charlson Comorbidity Score (Age + Comorbid Conditions) Criteria that do not apply:  
 Has Seen PCP in Last 6 Months (Yes=3, No=0) . Living with Significant Other. Assisted Living. LTAC. SNF. or  
Rehab Patient Length of Stay (>5 days = 3) IP Visits Last 12 Months (1-3=4, 4=9, >4=11) Expected Length of Stay 3d 19h Actual Length of Stay 3

## 2018-11-30 NOTE — PROGRESS NOTES
Spiritual Care Assessment/Progress Note [address] 
 
 
NAME: Maame Virk      MRN: [id number] AGE: 70 y.o. SEX: male Cheondoism Affiliation: Unknown Language: English  
 
11/30/2018     Total Time (in minutes): 31 Spiritual Assessment begun in [facility] 2 PROGRESSIVE CARE through conversation with: 
  
    [x]Patient        [] Family    [] Friend(s) Reason for Consult: Palliative Care, Initial/Spiritual Assessment Spiritual beliefs: (Please include comment if needed) 
   [] Identifies with a harley tradition:     
   [] Supported by a harley community:        
   [] Claims no spiritual orientation:       
   [] Seeking spiritual identity:            
   [] Adheres to an individual form of spirituality:       
   [x] Not able to assess:                   
 
    
Identified resources for coping:  
   [] Prayer                           
   [] Music                  [] Guided Imagery 
   [] Family/friends                 [] Pet visits [] Devotional reading                         [x] Unknown 
   [] Other:                                         
 
 
Interventions offered during this visit: (See comments for more details) Patient Interventions: Affirmation of emotions/emotional suffering, Affirmation of harley, Prayer (assurance of) Plan of Care: 
 
 [] Support spiritual and/or cultural needs  
 [] Support AMD and/or advance care planning process    
 [] Support grieving process 
 [] Coordinate Rites and/or Rituals  
 [] Coordination with community clergy 
 [x] No spiritual needs identified at this time 
 [] Detailed Plan of Care below (See Comments)  [] Make referral to Music Therapy 
[] Make referral to Pet Therapy    
[] Make referral to Addiction services 
[] Make referral to [facility] 
[] Make referral to Spiritual Care Partner 
[] No future visits requested       
[x] Follow up visits as needed Comments: The patient was in bed resting with a sitter at his bedside. After I introduced myself the patient extended his hand to shake. The patient appeared very groggy. When asked how he is feeling today, the patient responded that he hope what was making him feel ill would go away. The patient attempted to engage in small talk but appeared to doze off. I assured the patient that if there is something that I could attend to please let me know. The patient seemed to agree to contact a  in the future. The patient's spirituality could not be assessed with confidence. The patient seemed be conscious of the fact that he has serious challenges but how he is able to cope with medical issues was not explored. Rev. Dharmesh Najera EdD MDiv Palliative  Fellow For [place] Page [phone] ([phone])

## 2018-11-30 NOTE — PROGRESS NOTES
Patient refuses to take breathing treatments. Patient does not want mask on. I held the mask to his face and he refuses to take it. Patient is trying to be combative. Will not bother patient any more.

## 2018-11-30 NOTE — PROGRESS NOTES
[facility]: [phone] ([phone]) [facility]: [phone] ([phone]) Palliative Consult noted, reason for consult was due to patient refusing to take his medications and also to address [address]. Patient has no AMD, has two step-daughters. Met with patient briefly. He was alert, he was oriented to person and place. He did not look very comfortable. He was complaining of nausea and constipation. He has a sitter with him, for safety reasons, per RN. (Fall Risk). Beside RN in room with patient and noted that patient is getting treated for constipation. He also has something ordered for nausea, which they will give him if needed. LCSW wondered if patient was refusing meds as a result of nausea, but RN indicated that this was the first time he is mentioning nausea. LCSW explained role and fact that we want to try to help patient and support him. When asked how we can help, he noted he wants his stomach to feel better. According to RN, (step)daughter, Abby, visited yesterday, for a brief period of time. Chart notes that patient lives with a roommate who transports him to appointments and would likely be his transport for home when stable. We will follow up with patient on Monday as he is not in any shape to have [address] discussions at this time. Told patient we would be back to see him in a couple of days.

## 2018-11-30 NOTE — PROGRESS NOTES
ADULT PROTOCOL: JET AEROSOL  REASSESSMENT Patient  Adolfo English     70 y.o.   male     11/30/2018  12:22 PM 
 
Breath Sounds Pre Procedure: Right Breath Sounds: Diminished Left Breath Sounds: Diminished Breath Sounds Post Procedure: Right Breath Sounds: Diminished Left Breath Sounds: Diminished Breathing pattern: Pre procedure Breathing Pattern: Regular Post procedure Breathing Pattern: Regular Heart Rate: Pre procedure Pulse: 76 Post procedure Pulse: 95 Resp Rate: Pre procedure Respirations: 18 Post procedure Respirations: 20 
   
     
Cough: Pre procedure Cough: Non-productive Post procedure Cough: Non-productive Oxygen: O2 Device: Nasal cannula   1LNC Changed: NO SpO2: Pre procedure SpO2: 97 %   with oxygen Post procedure SpO2: 94 %  with oxygen Nebulizer Therapy: Current medications Aerosolized Medications: DuoNeb Changed: NO 
 
 
 
Problem List:  
Patient Active Problem List  
Diagnosis Code  [facility] J[facility]1  Acute respiratory failure with hypoxia (HCC) J96.01  
 Pulmonary emphysema (HCC) J43.9  Multiple closed fractures of ribs of both sides with delayed healing S22.43XG  Respiratory distress R06.03  
 Tobacco abuse Z72.0  Anxiety state F41.1 Respiratory Therapist: Melinda Harley, RT

## 2018-11-30 NOTE — PROGRESS NOTES
CM made room visit with pt who had sitter at bed side. Pt did not seem alert and oriented. CM will discuss possible SNF placement needs with daughter Rosa Maria Plata who is his emergency contact. UPDATE: 
CM left voice mail for pt's daughter Rosa Maria Plata to return call and explained the MD's recommendations for SNF placement at this time. CM stated there would be a SNF list left in the pt's room for pt's daughter to look at. UPDATE: 
CM has left SNF list in pt room for daughter to review. CM will continue to follow. Yani [address] [place] [address]

## 2018-12-01 LAB
ALBUMIN SERPL-MCNC: 3 G/DL (ref 3.5–5)
ALBUMIN/GLOB SERPL: 1 {RATIO} (ref 1.1–2.2)
ALP SERPL-CCNC: 49 U/L (ref 45–117)
ALT SERPL-CCNC: 29 U/L (ref 12–78)
ANION GAP SERPL CALC-SCNC: 8 MMOL/L (ref 5–15)
AST SERPL-CCNC: 21 U/L (ref 15–37)
BASOPHILS # BLD: 0 K/UL (ref 0–0.1)
BASOPHILS NFR BLD: 0 % (ref 0–1)
BILIRUB SERPL-MCNC: 1 MG/DL (ref 0.2–1)
BUN SERPL-MCNC: 19 MG/DL (ref 6–20)
BUN/CREAT SERPL: 25 (ref 12–20)
CALCIUM SERPL-MCNC: 8 MG/DL (ref 8.5–10.1)
CHLORIDE SERPL-SCNC: 107 MMOL/L (ref 97–108)
CO2 SERPL-SCNC: 24 MMOL/L (ref 21–32)
CREAT SERPL-MCNC: 0.76 MG/DL (ref 0.7–1.3)
DIFFERENTIAL METHOD BLD: NORMAL
EOSINOPHIL # BLD: 0 K/UL (ref 0–0.4)
EOSINOPHIL NFR BLD: 0 % (ref 0–7)
ERYTHROCYTE [DISTWIDTH] IN BLOOD BY AUTOMATED COUNT: 11.7 % (ref 11.5–14.5)
GLOBULIN SER CALC-MCNC: 3.1 G/DL (ref 2–4)
GLUCOSE SERPL-MCNC: 79 MG/DL (ref 65–100)
HCT VFR BLD AUTO: 38 % (ref 36.6–50.3)
HGB BLD-MCNC: 13.5 G/DL (ref 12.1–17)
IMM GRANULOCYTES # BLD: 0 K/UL (ref 0–0.04)
IMM GRANULOCYTES NFR BLD AUTO: 0 % (ref 0–0.5)
LYMPHOCYTES # BLD: 1.7 K/UL (ref 0.8–3.5)
LYMPHOCYTES NFR BLD: 23 % (ref 12–49)
MAGNESIUM SERPL-MCNC: 2 MG/DL (ref 1.6–2.4)
MCH RBC QN AUTO: 31.8 PG (ref 26–34)
MCHC RBC AUTO-ENTMCNC: 35.5 G/DL (ref 30–36.5)
MCV RBC AUTO: 89.4 FL (ref 80–99)
MONOCYTES # BLD: 0.5 K/UL (ref 0–1)
MONOCYTES NFR BLD: 6 % (ref 5–13)
NEUTS SEG # BLD: 5.1 K/UL (ref 1.8–8)
NEUTS SEG NFR BLD: 70 % (ref 32–75)
NRBC # BLD: 0 K/UL (ref 0–0.01)
NRBC BLD-RTO: 0 PER 100 WBC
PLATELET # BLD AUTO: 188 K/UL (ref 150–400)
PMV BLD AUTO: 9.7 FL (ref 8.9–12.9)
POTASSIUM SERPL-SCNC: 3.5 MMOL/L (ref 3.5–5.1)
PROT SERPL-MCNC: 6.1 G/DL (ref 6.4–8.2)
RBC # BLD AUTO: 4.25 M/UL (ref 4.1–5.7)
SODIUM SERPL-SCNC: 139 MMOL/L (ref 136–145)
WBC # BLD AUTO: 7.3 K/UL (ref 4.1–11.1)

## 2018-12-01 PROCEDURE — 74011250637 HC RX REV CODE- 250/637: Performed by: NURSE PRACTITIONER

## 2018-12-01 PROCEDURE — 65660000000 HC RM CCU STEPDOWN

## 2018-12-01 PROCEDURE — 74011636637 HC RX REV CODE- 636/637: Performed by: INTERNAL MEDICINE

## 2018-12-01 PROCEDURE — 74011000250 HC RX REV CODE- 250: Performed by: INTERNAL MEDICINE

## 2018-12-01 PROCEDURE — 94640 AIRWAY INHALATION TREATMENT: CPT

## 2018-12-01 PROCEDURE — 85025 COMPLETE CBC W/AUTO DIFF WBC: CPT

## 2018-12-01 PROCEDURE — 83735 ASSAY OF MAGNESIUM: CPT

## 2018-12-01 PROCEDURE — 74011250636 HC RX REV CODE- 250/636: Performed by: INTERNAL MEDICINE

## 2018-12-01 PROCEDURE — 80053 COMPREHEN METABOLIC PANEL: CPT

## 2018-12-01 PROCEDURE — G8978 MOBILITY CURRENT STATUS: HCPCS

## 2018-12-01 PROCEDURE — 74011250637 HC RX REV CODE- 250/637: Performed by: INTERNAL MEDICINE

## 2018-12-01 PROCEDURE — 77010033678 HC OXYGEN DAILY

## 2018-12-01 PROCEDURE — G8979 MOBILITY GOAL STATUS: HCPCS

## 2018-12-01 PROCEDURE — 97116 GAIT TRAINING THERAPY: CPT

## 2018-12-01 PROCEDURE — 36415 COLL VENOUS BLD VENIPUNCTURE: CPT

## 2018-12-01 PROCEDURE — 97164 PT RE-EVAL EST PLAN CARE: CPT

## 2018-12-01 PROCEDURE — 77030029684 HC NEB SM VOL KT MONA -A

## 2018-12-01 RX ORDER — ACETAMINOPHEN 325 MG/1
650 TABLET ORAL
Qty: 40 TAB | Refills: 0 | Status: SHIPPED | OUTPATIENT
Start: 2018-12-01

## 2018-12-01 RX ORDER — IPRATROPIUM BROMIDE AND ALBUTEROL SULFATE 2.5; .5 MG/3ML; MG/3ML
3 SOLUTION RESPIRATORY (INHALATION)
Qty: 100 NEBULE | Refills: 1 | Status: SHIPPED | OUTPATIENT
Start: 2018-12-01

## 2018-12-01 RX ORDER — PREDNISONE 10 MG/1
10 TABLET ORAL
Qty: 12 TAB | Refills: 0 | Status: ON HOLD | OUTPATIENT
Start: 2018-12-01 | End: 2021-06-21 | Stop reason: SDUPTHER

## 2018-12-01 RX ORDER — ASPIRIN 325 MG/1
100 TABLET, FILM COATED ORAL DAILY
Qty: 30 TAB | Refills: 1 | Status: SHIPPED | OUTPATIENT
Start: 2018-12-02

## 2018-12-01 RX ORDER — HYDROCODONE POLISTIREX AND CHLORPHENIRAMINE POLISTIREX 10; 8 MG/5ML; MG/5ML
5 SUSPENSION, EXTENDED RELEASE ORAL
Qty: 100 ML | Refills: 0 | Status: SHIPPED | OUTPATIENT
Start: 2018-12-01 | End: 2021-06-21

## 2018-12-01 RX ORDER — FOLIC ACID 1 MG/1
1 TABLET ORAL DAILY
Qty: 30 TAB | Refills: 1 | Status: SHIPPED | OUTPATIENT
Start: 2018-12-02

## 2018-12-01 RX ORDER — FAMOTIDINE 20 MG/1
20 TABLET, FILM COATED ORAL 2 TIMES DAILY
Qty: 60 TAB | Refills: 1 | Status: SHIPPED | OUTPATIENT
Start: 2018-12-01

## 2018-12-01 RX ORDER — GUAIFENESIN 600 MG/1
600 TABLET, EXTENDED RELEASE ORAL EVERY 12 HOURS
Qty: 20 TAB | Refills: 0 | Status: SHIPPED | OUTPATIENT
Start: 2018-12-01

## 2018-12-01 RX ORDER — IBUPROFEN 200 MG
1 TABLET ORAL DAILY
Qty: 30 PATCH | Refills: 0 | Status: SHIPPED | OUTPATIENT
Start: 2018-12-02 | End: 2019-01-01

## 2018-12-01 RX ORDER — LANOLIN ALCOHOL/MO/W.PET/CERES
3 CREAM (GRAM) TOPICAL
Qty: 30 TAB | Refills: 0 | Status: SHIPPED | OUTPATIENT
Start: 2018-12-01

## 2018-12-01 RX ORDER — ALBUTEROL SULFATE 90 UG/1
2 AEROSOL, METERED RESPIRATORY (INHALATION)
Qty: 1 INHALER | Refills: 1 | Status: SHIPPED | OUTPATIENT
Start: 2018-12-01

## 2018-12-01 RX ADMIN — IPRATROPIUM BROMIDE AND ALBUTEROL SULFATE 3 ML: .5; 3 SOLUTION RESPIRATORY (INHALATION) at 01:00

## 2018-12-01 RX ADMIN — Medication 10 ML: at 22:04

## 2018-12-01 RX ADMIN — AZITHROMYCIN MONOHYDRATE 500 MG: 500 INJECTION, POWDER, LYOPHILIZED, FOR SOLUTION INTRAVENOUS at 17:14

## 2018-12-01 RX ADMIN — IPRATROPIUM BROMIDE AND ALBUTEROL SULFATE 3 ML: .5; 3 SOLUTION RESPIRATORY (INHALATION) at 13:35

## 2018-12-01 RX ADMIN — Medication 100 MG: at 09:12

## 2018-12-01 RX ADMIN — POLYETHYLENE GLYCOL 3350 17 G: 17 POWDER, FOR SOLUTION ORAL at 09:12

## 2018-12-01 RX ADMIN — THERA TABS 1 TABLET: TAB at 09:12

## 2018-12-01 RX ADMIN — IPRATROPIUM BROMIDE AND ALBUTEROL SULFATE 3 ML: .5; 3 SOLUTION RESPIRATORY (INHALATION) at 20:13

## 2018-12-01 RX ADMIN — MELATONIN TAB 3 MG 3 MG: 3 TAB at 21:25

## 2018-12-01 RX ADMIN — FAMOTIDINE 20 MG: 20 TABLET ORAL at 09:12

## 2018-12-01 RX ADMIN — IPRATROPIUM BROMIDE AND ALBUTEROL SULFATE 3 ML: .5; 3 SOLUTION RESPIRATORY (INHALATION) at 07:44

## 2018-12-01 RX ADMIN — FOLIC ACID 1 MG: 1 TABLET ORAL at 09:12

## 2018-12-01 RX ADMIN — FAMOTIDINE 20 MG: 20 TABLET ORAL at 17:14

## 2018-12-01 RX ADMIN — Medication 10 ML: at 09:12

## 2018-12-01 RX ADMIN — GUAIFENESIN 600 MG: 600 TABLET, EXTENDED RELEASE ORAL at 21:25

## 2018-12-01 RX ADMIN — Medication 5 ML: at 14:00

## 2018-12-01 RX ADMIN — GUAIFENESIN 600 MG: 600 TABLET, EXTENDED RELEASE ORAL at 09:12

## 2018-12-01 RX ADMIN — ENOXAPARIN SODIUM 40 MG: 40 INJECTION, SOLUTION INTRAVENOUS; SUBCUTANEOUS at 21:26

## 2018-12-01 RX ADMIN — PREDNISONE 20 MG: 20 TABLET ORAL at 09:12

## 2018-12-01 NOTE — PROGRESS NOTES
Hospitalist Progress Note NAME: Jeanne Gaona :  1955 MRN:  [id number] Assessment / Plan: 
Acute Respiratory Failure with Hypoxia POA c/w supplemental O2, wean down as tolerated. So far tolerating room air, lungs sound better Acute COPD exacerbation POA c/w Z-max, bronchodilators, mucolytics, steroids switched to PO taper down, monitor. H/o Chronic Emphysema from Tobacco Abuse  Still smoking found smoking in the room in the ER, counseled H/o Recent Falls with subsequent Bilateral Rib Fractures- Subacute CXR no acute ASD, Chronic emphysema noted, Subacute bilateral Rib Fractures noted Initial ABG= 7.35/47/146/Bicarb 25, 99% on 40% FIO2 on BIPAP Bipolar Ds? ?: will ask for Psychiatry evaluation and also for capacity, apparently Psychiatry saw patient yesterday, no note in the chart Encephalopathy: seems close to baseline, monitor Code Status: Full Surrogate Decision Maker: daughter UXALV  [id number] DVT Prophylaxis: SQ lovenox GI Prophylaxis: IV pepcid for now Baseline: Pt is independent with ADLs at baseline, not on oxygen at home, is a smoker but is cutting down on his cigarettes & has been down 1 pack every 3 days now Patient is clinically stable ready for D/c to SNF Subjective: Chief Complaint / Reason for Physician Visit \"I feel good\"   Discussed with RN events overnight. Review of Systems: 
Symptom Y/N Comments  Symptom Y/N Comments Fever/Chills    Chest Pain Poor Appetite    Edema Cough y   Abdominal Pain Sputum    Joint Pain SOB/KO    Pruritis/Rash Nausea/vomit    Tolerating PT/OT Diarrhea    Tolerating Diet y Constipation    Other Could NOT obtain due to:   
 
Objective: VITALS:  
Last 24hrs VS reviewed since prior progress note. Most recent are: 
Patient Vitals for the past 24 hrs: 
 Temp Pulse Resp BP SpO2  
18 0750 97.6 °F (36.4 °C) 85 18 126/76 94 % 18 0745     96 % 12/01/18 0405 99.1 °F (37.3 °C) 86 18 92/60 96 % 12/01/18 0100     96 % 11/30/18 2305 97.7 °F (36.5 °C) 85 18 109/67 96 % 11/30/18 2024     96 % 11/30/18 1932 98.2 °F (36.8 °C) 99 20 108/71 95 % 11/30/18 1930 99.5 °F (37.5 °C) 87 18 108/71 96 % 11/30/18 1448 97.7 °F (36.5 °C) 88 18 133/83 96 % 11/30/18 1414     96 % 11/30/18 1142 98.1 °F (36.7 °C) 88 18 133/81 97 % Intake/Output Summary (Last 24 hours) at 12/1/2018 0945 Last data filed at 12/1/2018 7725 Gross per 24 hour Intake 985 ml Output 400 ml Net 585 ml PHYSICAL EXAM: 
General: WD, WN. Awake, cooperative, no acute distress   
EENT:  EOMI. Anicteric sclerae. MMM Resp:  Coarse BS, rhonchi CV:  Regular  rhythm,  No edema GI:  Soft, Non distended, Non tender.  +Bowel sounds Neurologic:  Alert and oriented X 2, normal speech, Psych:   Fair   insight. Not anxious nor agitated Skin:  No rashes. No jaundice Reviewed most current lab test results and cultures  YES Reviewed most current radiology test results   YES Review and summation of old records today    NO Reviewed patient's current orders and MAR    YES 
PMH/SH reviewed - no change compared to H&P 
________________________________________________________________________ Care Plan discussed with: 
  Comments Patient y Family  y DTR Abby ARORA y   
Care Manager Consultant Multidiciplinary team rounds were held today with , nursing, pharmacist and clinical coordinator. Patient's plan of care was discussed; medications were reviewed and discharge planning was addressed. ________________________________________________________________________ Total NON critical care TIME:  35   Minutes Total CRITICAL CARE TIME Spent:   Minutes non procedure based Comments >50% of visit spent in counseling and coordination of care y   
________________________________________________________________________ Molly Mendoza MD  
 
Procedures: see electronic medical records for all procedures/Xrays and details which were not copied into this note but were reviewed prior to creation of Plan. LABS: 
I reviewed today's most current labs and imaging studies. Pertinent labs include: 
Recent Labs 12/01/18 
623 623 148 11/29/18 0135 WBC 7.3 7.2 HGB 13.5 11.9*  
HCT 38.0 34.8*  
 193 Recent Labs 12/01/18 
623 623 148 11/29/18 
0135  142  
K 3.5 4.7  110* CO2 24 28 GLU 79 112* BUN 19 14 CREA 0.76 0.79 CA 8.0* 8.9 MG 2.0 2.3 ALB 3.0* 3.3* TBILI 1.0 0.4 SGOT 21 21 ALT 29 17 Signed: Molly Mendoza MD

## 2018-12-01 NOTE — PROGRESS NOTES
Occupational Therapy New orders acknowledged, OT Evaluation completed 11/29/2018 please refer to this, patient on OT caseload for 3x week. Skye Harding.  MS Eligio OTR/L

## 2018-12-01 NOTE — PROGRESS NOTES
Problem: Mobility Impaired (Adult and Pediatric) Goal: *Acute Goals and Plan of Care (Insert Text) Physical Therapy Goals Initiated 12/1/2018 1. Patient will move from supine to sit and sit to supine , scoot up and down and roll side to side in bed with independence within 7 day(s). 2.  Patient will transfer from bed to chair and chair to bed with independence using the least restrictive device within 7 day(s). 3.  Patient will perform sit to stand with independence within 7 day(s). 4.  Patient will ambulate with independence for 400 feet with the least restrictive device within 7 day(s). 5.  Patient will ascend/descend 4 stairs with 1 handrail(s) with modified independence within 7 day(s). physical Therapy REEVALUATION Patient: Jacque Low (45 y.o. male) Date: 12/1/2018 Primary Diagnosis: Acute exacerbation of chronic obstructive pulmonary disease (COPD) ([facility] [address] Acute respiratory failure with hypoxemia (HCC) Precautions:  Fall Chart, physical therapy assessment, plan of care and goals were reviewed. ASSESSMENT : 
Based on the objective data described below, the patient presents with impaired functional mobility secondary to impaired standing balance, generalized weakness, impaired gait mechanics, increased confusion, elevated HR, and decreased activity tolerance. Pt was evaluated by PT on 11/28/18 and found to be close to his functional baseline, however re-consult placed due to worsening weakness over the course of the past few days. Pt received supine in bed on RA with sitter present and agreeable to PT re-evaluation. Pt cleared by nursing for mobility. VSS at rest on RA. Bed mobility completed with supervision. Sitting balance intact while sitting on EOB. Sit<>stand transfers performed with CGA, however posterior lean noted in static stance, which pt was able to improve with min VCs.  He participated in STEELE balance test and scored 36/56 compared to 52/56, which he is what he scored on 11/28/18 during this session. He ambulated 320 ft with CGA, however exhibiting scissoring, mild path deviations, and mildly unsteady gait overall. No overt LOB noted, however pt would not be safe without CGA and no AD at this time. Pt may benefit from [facility] vs . -130s bpm post-gait training, improving to 115 bpm once seated at rest. Pt without complaints and SaO2 94% on RA. Pt was left sitting in bedside chair with all needs met, RN aware, and chair alarm on following therapy session. Recommend pt discharge home with HHPT, 24/7 supervision, and use of AD (likely RW at this time) pending progress in acute PT. PT will continue to follow to address mobility impairments as noted above. Patient will benefit from skilled intervention to address the above impairments. Patients rehabilitation potential is considered to be Fair Factors which may influence rehabilitation potential include:  
[]           None noted 
[x]           Mental ability/status [x]           Medical condition 
[]           Home/family situation and support systems 
[x]           Safety awareness 
[]           Pain tolerance/management 
[]           Other: PLAN : 
Recommendations and Planned Interventions: 
[x]             Bed Mobility Training             []      Neuromuscular Re-Education [x]             Transfer Training                   []      Orthotic/Prosthetic Training 
[x]             Gait Training                         []      Modalities [x]             Therapeutic Exercises           []      Edema Management/Control [x]             Therapeutic Activities            [x]      Patient and Family Training/Education []             Other (comment): Frequency/Duration: Patient will be followed by physical therapy 3 times a week to address goals. Discharge Recommendations: Home Health and 24/7 supervision Further Equipment Recommendations for Discharge: rolling walker if D/C home this weekend SUBJECTIVE:  
Patient stated Maury Donato you for working with me.  OBJECTIVE DATA SUMMARY:  
 
Past Medical History:  
Diagnosis Date  Asthma  COPD (chronic obstructive pulmonary disease) ([facility]) History reviewed. No pertinent surgical history. Hospital course since last seen and reason for reevaluation: Pt was evaluated on 11/28/18 and found to be close to his functional baseline, therefore PT services discontinued. Critical Behavior: 
Neurologic State: Confused, Alert Orientation Level: Oriented to person, Oriented to place, Disoriented to time, Disoriented to situation Cognition: Decreased attention/concentration, Poor safety awareness Safety/Judgement: Lack of insight into deficits Skin:  Intact Strength:   
Strength: Generally decreased, functional 
  
  
  
  
  
 
 Tone & Sensation:  
Tone: Normal 
  
  
  
  
  
  
  
  
   
Range Of Motion: 
AROM: Generally decreased, functional 
  
  
  
  
  
  
  
Coordination: 
Coordination: Generally decreased, functional 
Functional Mobility: 
Bed Mobility: 
Rolling: Supervision Supine to Sit: Supervision Scooting: SupervisionTransfers: 
Sit to Stand: Contact guard assistance Stand to Sit: Contact guard assistance Balance:  
Sitting: Intact Sitting - Static: Good (unsupported) Sitting - Dynamic: Good (unsupported) Standing: Impaired Standing - Static: Fair Standing - Dynamic : FairAmbulation/Gait Training:Distance (ft): 320 Feet (ft) Assistive Device: Gait belt Ambulation - Level of Assistance: Contact guard assistance;Assist x1;Additional time Gait Abnormalities: Decreased step clearance;Scissoring;Path deviations Base of Support: Narrowed Speed/Meseret: Pace decreased (<100 feet/min); Slow Step Length: Left shortened;Right shortened Functional Measure: 
Fina Cutter Balance Test: 
 
Sitting to Standing: 3 Standing Unsupported: 3 Sitting with Back Unsupported: 4 Standing to Sitting: 3 Transfers: 2 Standing Unsupported with Eyes Closed: 3 Standing Unsupported with Feet Together: 3 Reach Forward with Outstretched Arm: 3  Object: 3 Turn to Look Over Shoulders: 1 Turn 360 Degrees: 1 Alternate Foot on Step/Stool: 2 Standing Unsupported One Foot in Front: 2 Stand on One Leg: 3 Total: 36 
 
 
 
56=Maximum possible score;  
0-20=High fall risk 21-40=Moderate fall risk 41-56=Low fall risk Steele Balance Test and G-code impairment scale: 
Percentage of Impairment CH 
 
0% 
 CI 
 
1-19% CJ 
 
20-39% CK 
 
40-59% CL 
 
60-79% CM 
 
80-99% CN  
 
100% [facility] Score 0-56 56 45-55 34-44 23-33 12-22 1-11 0  
 
 
G codes: In compliance with CMSs Claims Based Outcome Reporting, the following G-code set was chosen for this patient based on their primary functional limitation being treated: The outcome measure chosen to determine the severity of the functional limitation was the STEELE with a score of 36/52 which was correlated with the impairment scale. ? Mobility - Walking and Moving Around:  
  - CURRENT STATUS: CJ - 20%-39% impaired, limited or restricted  - GOAL STATUS: CI - 1%-19% impaired, limited or restricted  - D/C STATUS:  ---------------To be determined---------------  
 
Pain: 
Pain Scale 1: Numeric (0 - 10) Pain Intensity 1: 0 Activity Tolerance:  
Good/fair - pt without complaints, however -130s bpm with activity; SaO2 >92% on RA Please refer to the flowsheet for vital signs taken during this treatment. After treatment:  
[x]  Patient left in no apparent distress sitting up in chair 
[]  Patient left in no apparent distress in bed 
[x]  Call bell left within reach [x]  Nursing notified 
[]  Caregiver present [x]  Bed alarm activated COMMUNICATION/EDUCATION:  
The patients plan of care was discussed with: Physical Therapist and Registered Nurse. [x]  Fall prevention education was provided and the patient/caregiver indicated understanding. [x]  Patient/family have participated as able in goal setting and plan of care. [x]  Patient/family agree to work toward stated goals and plan of care. []  Patient understands intent and goals of therapy, but is neutral about his/her participation. []  Patient is unable to participate in goal setting and plan of care. Thank you for this referral. 
Rhianna Sánchez, PT, DPT Time Calculation: 16 mins

## 2018-12-01 NOTE — PROGRESS NOTES
Bedside shift change report given to Brock Delgado RN (oncoming nurse) by Eleno Rodriguez RN (offgoing nurse). Report included the following information SBAR, Kardex, ED Summary, Procedure Summary, Intake/Output, MAR, Recent Results, Med Rec Status and Cardiac Rhythm NSR.  
 
1150- Spoke with Dr Brendan Johnson regarding stopping of CIWA, gave permission to put order in to stop CIWA. Informed Dr that pt is too weak and dyspnic to walk to bathroom this am.  Dr Brendan Johnson wants to get rid of sitter and work on placement for patient via care management. Informed Dr Brea Chamberlain that pt is refusing oral meds. 200- NP Jace Memos asked me to put in a consult for Palliative Care since Pt is refusing medications. 1400- Soap suds enema given with small results. Pt c/o nausea will give zofran. 1600-  Pt states stomach is feeling better.

## 2018-12-01 NOTE — PROGRESS NOTES
PCU SHIFT NURSING NOTE Bedside shift change report given to Chun Quiros (oncoming nurse) by Lucía Doyle (offgoing nurse). Report included the following information SBAR, Kardex, Intake/Output, MAR, Recent Results and Cardiac Rhythm NSR-ST. Admission Date 11/26/2018 Admission Diagnosis Acute exacerbation of chronic obstructive pulmonary disease (COPD) ([facility] [address] Acute respiratory failure with hypoxemia (HCC) Consults IP CONSULT TO PSYCHIATRY IP CONSULT TO PALLIATIVE CARE - PROVIDER Consults []PT []OT []Speech  
[]Case Management  
  
[] Palliative Cardiac Monitoring Order []Yes []No  
 
IV drips []Yes Drip:                            Dose: 
Drip:                            Dose: 
Drip:                            Dose:  
[]No  
 
GI Prophylaxis []Yes []No  
 
 
 
DVT Prophylaxis SCDs:     
     
 Nba stockings:     
  
[] Medication []Contraindicated []None Activity Level Activity Level: Up with Assistance Activity Assistance: Partial (one person) Purposeful Rounding every 1-2 hour? []Yes Zavala Score  Total Score: 5 Bed Alarm (If score 3 or >) []Yes  
[] Refused (See signed refusal form in chart) Vito Score  Vito Score: 18 Vito Score (if score 14 or less) []PMT consult  
[]Wound Care consult []Specialty bed  
[] Nutrition consult Needs prior to discharge:  
Home O2 required:   
[]Yes []No  
 If yes, how much O2 required? Other:  
 Last Bowel Movement: Last Bowel Movement Date: 11/28/18 Influenza Vaccine Received Flu Vaccine for Current Season (usually Sept-March): Yes Pneumonia Vaccine Diet Active Orders Diet DIET REGULAR  
  
LDAs Peripheral IV 11/29/18 Anterior;Right;Superior; Upper Arm (Active) Site Assessment Clean, dry, & intact 11/30/2018  7:32 PM  
Phlebitis Assessment 0 11/30/2018  7:32 PM  
Infiltration Assessment 0 11/30/2018  7:32 PM  
 Dressing Status Clean, dry, & intact 11/30/2018  7:32 PM  
Dressing Type Tape;Transparent 11/30/2018  7:32 PM  
Hub Color/Line Status Pink; Infusing 11/30/2018  7:32 PM  
Alcohol Cap Used Yes 11/29/2018  7:06 PM  
                  
Urinary Catheter Intake & Output Date 11/29/18 1900 - 11/30/18 0659 11/30/18 0700 - 12/01/18 2731 Shift 5862-1322 24 Hour Total 2694-5332 4344-1903 24 Hour Total  
INTAKE  
P.O.  180 120  120  
  P. O.  180 120  120 Shift Total(mL/kg)  180(2.8) 120(1.8)  120(1.8) OUTPUT Urine(mL/kg/hr) 550 850 Urine Voided 550 850 Shift Total(mL/kg) 550(8.5) 850(13.1) NET -550 -670 120  120 Weight (kg) 65 65 65 65 65 Readmission Risk Assessment Tool Score Low Risk 8 Total Score   
  
 5 Pt. Coverage (Medicare=5 , Medicaid, or Self-Pay=4) 3 Charlson Comorbidity Score (Age + Comorbid Conditions) Criteria that do not apply:  
 Has Seen PCP in Last 6 Months (Yes=3, No=0) . Living with Significant Other. Assisted Living. LTAC. SNF. or  
Rehab Patient Length of Stay (>5 days = 3) IP Visits Last 12 Months (1-3=4, 4=9, >4=11) Expected Length of Stay 3d 19h Actual Length of Stay 4

## 2018-12-01 NOTE — PROGRESS NOTES
Pt transferred from PCU to Med Tele unit. Previous CM informed the writer that discharge orders had been placed for SNF but pt still on 1:1 sitter. Sitter discontinued at 1030. SNF list previously provided to pt's family to review. CM made unsuccessful phone call to pt's DTR, left confidential VM requesting follow-up as soon as possible to get SNF choice. Meghan Bernstein MSW Supervisee in Social Work, [facility] [phone]

## 2018-12-01 NOTE — DISCHARGE SUMMARY
Hospitalist Discharge Summary Patient ID: 
Willie Sims 
[id number] 
72 y.o. 
1955 PCP on record: Monica Castro MD 
 
Admit date: 11/26/2018 Discharge date and time: 12/1/2018 DISCHARGE DIAGNOSIS: 
 
Respiratory Failure, COPD, Falls, Encephalopathy CONSULTATIONS: 
IP CONSULT TO PSYCHIATRY IP CONSULT TO PALLIATIVE CARE - PROVIDER Excerpted HPI from H&P of Helen Cuellar MD: 
Willie Sims is a 70 y.o.  male who presents with above complains from home via EMS. Pt presents with CC of worsening SOB x 1 week H/o associated with cough with minimal sputum H/o chest pain - more so splinting from the pain on coughing due to recent Rib fractures s/p fall H/o smoking all his life, has been working on cutting down on smokes- now 1 pack every 3 days Denies any fever, chills Pt was found to be severely hypoxic & increased work of breathing by EMS- was put on BIPAP in ER- improved but was unable to be weaned off it to NC- back on BIPAP now We were asked to admit for work up and evaluation of the above problems. ______________________________________________________________________ DISCHARGE SUMMARY/HOSPITAL COURSE:  for full details see H&P, daily progress notes, labs, consult notes. Acute Respiratory Failure with Hypoxia POA c/w supplemental O2, wean down as tolerated. So far tolerating room air, lungs sound better Acute COPD exacerbation POA completing Z-max, bronchodilators, mucolytics, steroids switched to PO taper down, monitor. H/o Chronic Emphysema from Tobacco Abuse  Still smoking found smoking in the room in the ER, counseled H/o Recent Falls with subsequent Bilateral Rib Fractures- Subacute CXR no acute ASD, Chronic emphysema noted, Subacute bilateral Rib Fractures noted Initial ABG= 7.35/47/146/Bicarb 25, 99% on 40% FIO2 on BIPAP Encephalopathy: seems close to baseline, monitor, much better now, no further Psychiatry recommendations. May have some degree of dementia Code Status: Full Surrogate Decision Maker: daughter ZJORDAN [id number] [id number] DVT Prophylaxis: SQ lovenox GI Prophylaxis: IV pepcid for now Baseline: Pt is independent with ADLs at baseline, not on oxygen at home, is a smoker but is cutting down on his cigarettes & has been down 1 pack every 3 days now 
  
D/c to SNF today, F/U with PCP 
_______________________________________________________________________ Patient seen and examined by me on discharge day. Pertinent Findings: 
Gen:    Not in distress Chest: Coarse BS, rhonchi CVS:   Regular rhythm. No edema Abd:  Soft, not distended, not tender Neuro:  Alert, GCS [id number] 
_______________________________________________________________________ DISCHARGE MEDICATIONS:  
Current Discharge Medication List  
  
START taking these medications Details  
acetaminophen (TYLENOL) 325 mg tablet Take 2 Tabs by mouth every four (4) hours as needed. Qty: 40 Tab, Refills: 0  
  
albuterol-ipratropium (DUO-NEB) 2.5 mg-0.5 mg/3 ml nebu 3 mL by Nebulization route every four (4) hours as needed. COPD J44.9 Qty: 100 Nebule, Refills: 1  
  
chlorpheniramine-HYDROcodone (TUSSIONEX) 10-8 mg/5 mL suspension Take 5 mL by mouth every twelve (12) hours as needed for Cough. Max Daily Amount: 10 mL. Qty: 100 mL, Refills: 0 Associated Diagnoses: COPD exacerbation ([address] famotidine (PEPCID) 20 mg tablet Take 1 Tab by mouth two (2) times a day. Qty: 60 Tab, Refills: 1  
  
folic acid (FOLVITE) 1 mg tablet Take 1 Tab by mouth daily. Qty: 30 Tab, Refills: 1  
  
guaiFENesin ER (MUCINEX) 600 mg ER tablet Take 1 Tab by mouth every twelve (12) hours. Qty: 20 Tab, Refills: 0  
  
melatonin 3 mg tablet Take 1 Tab by mouth nightly. Qty: 30 Tab, Refills: 0  
  
nicotine (NICODERM CQ) 14 mg/24 hr patch 1 Patch by TransDERmal route daily for 30 days. Qty: 30 Patch, Refills: 0 predniSONE (DELTASONE) 10 mg tablet Take 10 mg by mouth Multiple. Take 2 tab po daily for 3 days then Take 1 tab po daily for 3 days then Take 1/2 tab po daily for 3 days then stop Qty: 12 Tab, Refills: 0 Thiamine Mononitrate (B-1) 100 mg tablet Take 1 Tab by mouth daily. Qty: 30 Tab, Refills: 1 CONTINUE these medications which have CHANGED Details  
albuterol (PROVENTIL HFA, VENTOLIN HFA, PROAIR HFA) 90 mcg/actuation inhaler Take 2 Puffs by inhalation every four (4) hours as needed for Wheezing. COPD J44.9 Qty: 1 Inhaler, Refills: 1 STOP taking these medications  
  
 albuterol (ACCUNEB) 1.25 mg/3 mL nebu Comments:  
Reason for Stopping: My Recommended Diet, Activity, Wound Care, and follow-up labs are listed in the patient's Discharge Insturctions which I have personally completed and reviewed. ______________________________________________________________________ Risk of deterioration: Moderate Condition at Discharge:  Stable 
______________________________________________________________________ Disposition SNF/LTC 
______________________________________________________________________ Care Plan discussed with:  
Patient, Family, RN, Care Manager, Consultant 
 
______________________________________________________________________ Code Status: Full Code 
______________________________________________________________________ Follow up with: PCP : Lavon Hughes MD 
Follow-up Information Follow up With Specialties Details Why Contact Info Lavon Hughes MD Internal Medicine   [address] [address] 
[phone] F/U PCP Total time in minutes spent coordinating this discharge (includes going over instructions, follow-up, prescriptions, and preparing report for sign off to her PCP) :  35 minutes Signed: 
Tiffany Bautista MD

## 2018-12-01 NOTE — PROGRESS NOTES
**Consult Information** 
Member Facility: [address] [place] [address],[address] Facility MRN: [id number] Consult ID: [id number] Facility Time Zone: ET 
Date and Time of Consult: 12/01/2018 12:50:27 AM 
Requesting Clinician: Keily Alarcon Time of Call : 12/01/2018 12:54:00 AM 
Patient Name: Fidelia Zavala Date of Birth: [id number] Gender: Male **Clinical Note** Clinical Note: RN requesting patient be transferred to a lower level of care. Last physician note stated \"patient is still very weak\". I will leave patient there for now.   
No bed crunch per RN

## 2018-12-01 NOTE — PROGRESS NOTES
PCU SHIFT NURSING NOTE Bedside and Verbal shift change report given to Jessica Blair RN (oncoming nurse) by Sharri Khan RN (offgoing nurse). Report included the following information SBAR, Kardex, Intake/Output, MAR, Recent Results and Cardiac Rhythm NSR. Shift Summary:  
 
[facility] with JULIO CESAR Moreira regarding discharge as pt still has sitter; will remove sitter at this time. 1145 - TRANSFER - OUT REPORT: 
 
Verbal report given to Marilu Bautista RN(name) on Jacque Car  being transferred to [facility](unit) for routine progression of care Report consisted of patients Situation, Background, Assessment and  
Recommendations(SBAR). Information from the following report(s) SBAR, Kardex, Intake/Output, MAR, Recent Results and Cardiac Rhythm NSR was reviewed with the receiving nurse. Lines:  
Peripheral IV 11/29/18 Anterior;Right;Superior; Upper Arm (Active) Site Assessment Clean, dry, & intact 12/1/2018  7:52 AM  
Phlebitis Assessment 0 12/1/2018  7:52 AM  
Infiltration Assessment 0 12/1/2018  7:52 AM  
Dressing Status Clean, dry, & intact 12/1/2018  7:52 AM  
Dressing Type Tape;Transparent 12/1/2018  7:52 AM  
Hub Color/Line Status Pink 12/1/2018  7:52 AM  
Alcohol Cap Used Yes 11/29/2018  7:06 PM  
  
 
Opportunity for questions and clarification was provided. Patient transported with: 
 Intalio Admission Date 11/26/2018 Admission Diagnosis Acute exacerbation of chronic obstructive pulmonary disease (COPD) ([facility] [address] Acute respiratory failure with hypoxemia (HCC) Consults IP CONSULT TO PSYCHIATRY IP CONSULT TO PALLIATIVE CARE - PROVIDER Consults [x]PT []OT []Speech [x]Case Management  
  
[] Palliative Cardiac Monitoring Order  
[x]Yes []No  
 
IV drips []Yes Drip:                            Dose: 
Drip:                            Dose: 
Drip:                            Dose:  
[x]No  
 
GI Prophylaxis []Yes []No  
 
 
 
DVT Prophylaxis SCDs:     
     
 Nba stockings:     
  
[] Medication []Contraindicated []None Activity Level Activity Level: Up with Assistance Activity Assistance: Partial (one person) Purposeful Rounding every 1-2 hour? [x]Yes Zavala Score  Total Score: 5 Bed Alarm (If score 3 or >) [x]Yes  
[] Refused (See signed refusal form in chart) Vito Score  Vito Score: 18 Vito Score (if score 14 or less) []PMT consult  
[]Wound Care consult []Specialty bed  
[] Nutrition consult Needs prior to discharge:  
Home O2 required:   
[]Yes  
[x]No  
 If yes, how much O2 required? Other:  
 Last Bowel Movement: Last Bowel Movement Date: 11/28/18 Influenza Vaccine Received Flu Vaccine for Current Season (usually Sept-March): Yes Pneumonia Vaccine Diet Active Orders Diet DIET REGULAR  
  
LDAs Peripheral IV 11/29/18 Anterior;Right;Superior; Upper Arm (Active) Site Assessment Clean, dry, & intact 12/1/2018  4:05 AM  
Phlebitis Assessment 0 12/1/2018  4:05 AM  
Infiltration Assessment 0 12/1/2018  4:05 AM  
Dressing Status Clean, dry, & intact 12/1/2018  4:05 AM  
Dressing Type Tape;Transparent 12/1/2018  4:05 AM  
Hub Color/Line Status Pink 12/1/2018  4:05 AM  
Alcohol Cap Used Yes 11/29/2018  7:06 PM  
                  
Urinary Catheter Intake & Output Date 11/30/18 0700 - 12/01/18 8360 12/01/18 0700 - 12/02/18 0887 Shift 2131-8928 9623-0022 24 Hour Total 1031-9807 8012-5965 24 Hour Total  
INTAKE  
P[address]O. 195 60 255 P.O. 195 60 255     
I.V.(mL/kg/hr) 250(0.3)  250(0.2) Volume (azithromycin (ZITHROMAX) 500 mg in 0.9% sodium chloride 250 mL ([id number])) 250  250 Shift Total(mL/kg) 445(6.8) 60(0.9) 505(7.7) OUTPUT Urine(mL/kg/hr)  300(0.4) 300(0.2) Urine Voided  300 300 Urine Occurrence(s)  1 x 1 x Shift Total(mL/kg)  300(4.6) 300(4.6)  -240 205 Weight (kg) 65 65.6 65.6 65.6 65.6 65.6 Readmission Risk Assessment Tool Score Low Risk 8 Total Score   
  
 5 Pt. Coverage (Medicare=5 , Medicaid, or Self-Pay=4) 3 Charlson Comorbidity Score (Age + Comorbid Conditions) Criteria that do not apply:  
 Has Seen PCP in Last 6 Months (Yes=3, No=0) . Living with Significant Other. Assisted Living. LTAC. SNF. or  
Rehab Patient Length of Stay (>5 days = 3) IP Visits Last 12 Months (1-3=4, 4=9, >4=11) Expected Length of Stay 3d 19h Actual Length of Stay 5

## 2018-12-01 NOTE — PROGRESS NOTES
Pt transfer from PCU. Per report patient is not going to be discharged today. They removed the sitter at 10:30 this am and the patient needs to go 24 hours without a sitter before facility will accept.

## 2018-12-02 LAB
ALBUMIN SERPL-MCNC: 3.2 G/DL (ref 3.5–5)
ALBUMIN/GLOB SERPL: 0.9 {RATIO} (ref 1.1–2.2)
ALP SERPL-CCNC: 55 U/L (ref 45–117)
ALT SERPL-CCNC: 36 U/L (ref 12–78)
ANION GAP SERPL CALC-SCNC: 7 MMOL/L (ref 5–15)
AST SERPL-CCNC: 20 U/L (ref 15–37)
BASOPHILS # BLD: 0 K/UL (ref 0–0.1)
BASOPHILS NFR BLD: 0 % (ref 0–1)
BILIRUB SERPL-MCNC: 0.7 MG/DL (ref 0.2–1)
BUN SERPL-MCNC: 14 MG/DL (ref 6–20)
BUN/CREAT SERPL: 19 (ref 12–20)
CALCIUM SERPL-MCNC: 9.3 MG/DL (ref 8.5–10.1)
CHLORIDE SERPL-SCNC: 107 MMOL/L (ref 97–108)
CO2 SERPL-SCNC: 26 MMOL/L (ref 21–32)
CREAT SERPL-MCNC: 0.74 MG/DL (ref 0.7–1.3)
DIFFERENTIAL METHOD BLD: NORMAL
EOSINOPHIL # BLD: 0.1 K/UL (ref 0–0.4)
EOSINOPHIL NFR BLD: 1 % (ref 0–7)
ERYTHROCYTE [DISTWIDTH] IN BLOOD BY AUTOMATED COUNT: 11.6 % (ref 11.5–14.5)
GLOBULIN SER CALC-MCNC: 3.7 G/DL (ref 2–4)
GLUCOSE SERPL-MCNC: 112 MG/DL (ref 65–100)
HCT VFR BLD AUTO: 41.7 % (ref 36.6–50.3)
HGB BLD-MCNC: 14.9 G/DL (ref 12.1–17)
IMM GRANULOCYTES # BLD: 0 K/UL (ref 0–0.04)
IMM GRANULOCYTES NFR BLD AUTO: 0 % (ref 0–0.5)
LYMPHOCYTES # BLD: 2.2 K/UL (ref 0.8–3.5)
LYMPHOCYTES NFR BLD: 25 % (ref 12–49)
MAGNESIUM SERPL-MCNC: 2.2 MG/DL (ref 1.6–2.4)
MCH RBC QN AUTO: 31.7 PG (ref 26–34)
MCHC RBC AUTO-ENTMCNC: 35.7 G/DL (ref 30–36.5)
MCV RBC AUTO: 88.7 FL (ref 80–99)
MONOCYTES # BLD: 0.6 K/UL (ref 0–1)
MONOCYTES NFR BLD: 6 % (ref 5–13)
NEUTS SEG # BLD: 6 K/UL (ref 1.8–8)
NEUTS SEG NFR BLD: 68 % (ref 32–75)
NRBC # BLD: 0 K/UL (ref 0–0.01)
NRBC BLD-RTO: 0 PER 100 WBC
PLATELET # BLD AUTO: 240 K/UL (ref 150–400)
PMV BLD AUTO: 9.8 FL (ref 8.9–12.9)
POTASSIUM SERPL-SCNC: 3.2 MMOL/L (ref 3.5–5.1)
PROT SERPL-MCNC: 6.9 G/DL (ref 6.4–8.2)
RBC # BLD AUTO: 4.7 M/UL (ref 4.1–5.7)
SODIUM SERPL-SCNC: 140 MMOL/L (ref 136–145)
WBC # BLD AUTO: 8.9 K/UL (ref 4.1–11.1)

## 2018-12-02 PROCEDURE — 94640 AIRWAY INHALATION TREATMENT: CPT

## 2018-12-02 PROCEDURE — 74011636637 HC RX REV CODE- 636/637: Performed by: INTERNAL MEDICINE

## 2018-12-02 PROCEDURE — 80053 COMPREHEN METABOLIC PANEL: CPT

## 2018-12-02 PROCEDURE — 74011250637 HC RX REV CODE- 250/637: Performed by: INTERNAL MEDICINE

## 2018-12-02 PROCEDURE — 74011000250 HC RX REV CODE- 250: Performed by: INTERNAL MEDICINE

## 2018-12-02 PROCEDURE — 74011250637 HC RX REV CODE- 250/637: Performed by: NURSE PRACTITIONER

## 2018-12-02 PROCEDURE — 74011250636 HC RX REV CODE- 250/636: Performed by: INTERNAL MEDICINE

## 2018-12-02 PROCEDURE — 83735 ASSAY OF MAGNESIUM: CPT

## 2018-12-02 PROCEDURE — 65660000000 HC RM CCU STEPDOWN

## 2018-12-02 PROCEDURE — 85025 COMPLETE CBC W/AUTO DIFF WBC: CPT

## 2018-12-02 PROCEDURE — 36415 COLL VENOUS BLD VENIPUNCTURE: CPT

## 2018-12-02 PROCEDURE — 94760 N-INVAS EAR/PLS OXIMETRY 1: CPT

## 2018-12-02 RX ORDER — POTASSIUM CHLORIDE 20 MEQ/1
40 TABLET, EXTENDED RELEASE ORAL
Status: COMPLETED | OUTPATIENT
Start: 2018-12-02 | End: 2018-12-02

## 2018-12-02 RX ORDER — IPRATROPIUM BROMIDE AND ALBUTEROL SULFATE 2.5; .5 MG/3ML; MG/3ML
3 SOLUTION RESPIRATORY (INHALATION)
Status: DISCONTINUED | OUTPATIENT
Start: 2018-12-02 | End: 2018-12-03 | Stop reason: HOSPADM

## 2018-12-02 RX ADMIN — Medication 10 ML: at 05:26

## 2018-12-02 RX ADMIN — Medication 10 ML: at 21:34

## 2018-12-02 RX ADMIN — ENOXAPARIN SODIUM 40 MG: 40 INJECTION, SOLUTION INTRAVENOUS; SUBCUTANEOUS at 21:34

## 2018-12-02 RX ADMIN — IPRATROPIUM BROMIDE AND ALBUTEROL SULFATE 3 ML: .5; 3 SOLUTION RESPIRATORY (INHALATION) at 13:20

## 2018-12-02 RX ADMIN — GUAIFENESIN 600 MG: 600 TABLET, EXTENDED RELEASE ORAL at 09:20

## 2018-12-02 RX ADMIN — GUAIFENESIN 600 MG: 600 TABLET, EXTENDED RELEASE ORAL at 21:34

## 2018-12-02 RX ADMIN — IPRATROPIUM BROMIDE AND ALBUTEROL SULFATE 3 ML: .5; 3 SOLUTION RESPIRATORY (INHALATION) at 07:31

## 2018-12-02 RX ADMIN — IPRATROPIUM BROMIDE AND ALBUTEROL SULFATE 3 ML: .5; 3 SOLUTION RESPIRATORY (INHALATION) at 02:15

## 2018-12-02 RX ADMIN — THERA TABS 1 TABLET: TAB at 09:20

## 2018-12-02 RX ADMIN — FOLIC ACID 1 MG: 1 TABLET ORAL at 09:20

## 2018-12-02 RX ADMIN — POTASSIUM CHLORIDE 40 MEQ: 20 TABLET, EXTENDED RELEASE ORAL at 09:19

## 2018-12-02 RX ADMIN — MELATONIN TAB 3 MG 3 MG: 3 TAB at 21:34

## 2018-12-02 RX ADMIN — FAMOTIDINE 20 MG: 20 TABLET ORAL at 09:20

## 2018-12-02 RX ADMIN — FAMOTIDINE 20 MG: 20 TABLET ORAL at 17:23

## 2018-12-02 RX ADMIN — Medication 10 ML: at 17:23

## 2018-12-02 RX ADMIN — IPRATROPIUM BROMIDE AND ALBUTEROL SULFATE 3 ML: .5; 3 SOLUTION RESPIRATORY (INHALATION) at 20:04

## 2018-12-02 RX ADMIN — PREDNISONE 20 MG: 20 TABLET ORAL at 09:20

## 2018-12-02 RX ADMIN — POLYETHYLENE GLYCOL 3350 17 G: 17 POWDER, FOR SOLUTION ORAL at 09:20

## 2018-12-02 RX ADMIN — Medication 100 MG: at 09:20

## 2018-12-02 NOTE — PROGRESS NOTES
Bedside shift change report given to Carolynn (oncoming nurse) by Mari Haddad (offgoing nurse). Report included the following information SBAR, Kardex, Intake/Output, MAR and Recent Results.

## 2018-12-02 NOTE — PROGRESS NOTES
Hospitalist Progress Note NAME: Angelica Mac :  1955 MRN:  [id number] Assessment / Plan: 
Acute Respiratory Failure with Hypoxia POA c/w supplemental O2, wean down as tolerated. So far tolerating room air for 2 days, lungs sound better Acute COPD exacerbation POA c/w Z-max, bronchodilators, mucolytics, steroids switched to PO taper down, monitor. H/o Chronic Emphysema from Tobacco Abuse  Still smoking found smoking in the room in the ER, counseled H/o Recent Falls with subsequent Bilateral Rib Fractures- Subacute CXR no acute ASD, Chronic emphysema noted, Subacute bilateral Rib Fractures noted Initial ABG= 7.35/47/146/Bicarb 25, 99% on 40% FIO2 on BIPAP Bipolar Ds? ?: will ask for Psychiatry evaluation and also for capacity, apparently Psychiatry saw patient but  no note in the chart Encephalopathy: seems close to baseline, monitor, so far resolved Code Status: Full Surrogate Decision Maker: daughter DDGAC  [phone] DVT Prophylaxis: SQ lovenox GI Prophylaxis: IV pepcid for now Baseline: Pt is independent with ADLs at baseline, not on oxygen at home, is a smoker but is cutting down on his cigarettes & has been down 1 pack every 3 days now Patient is clinically stable ready for D/c to SNF, documentation had not been sent yet Subjective: Chief Complaint / Reason for Physician Visit \"I feel all right\"   Discussed with RN events overnight. Review of Systems: 
Symptom Y/N Comments  Symptom Y/N Comments Fever/Chills    Chest Pain Poor Appetite    Edema Cough y   Abdominal Pain Sputum    Joint Pain SOB/KO    Pruritis/Rash Nausea/vomit    Tolerating PT/OT Diarrhea    Tolerating Diet y Constipation    Other Could NOT obtain due to:   
 
Objective: VITALS:  
Last 24hrs VS reviewed since prior progress note. Most recent are: 
Patient Vitals for the past 24 hrs: 
 Temp Pulse Resp BP SpO2 12/02/18 0845 97.4 °F (36.3 °C) 88 16 123/82 95 % 12/02/18 0731     93 % 12/02/18 0213     93 % 12/02/18 0131 98 °F (36.7 °C) 96 18 146/90 94 % 12/01/18 2315 97.6 °F (36.4 °C) 92 16 106/75 92 % 12/01/18 2012     90 % 12/01/18 1952 98 °F (36.7 °C) 92 16 127/73 96 % 12/01/18 1422 97.7 °F (36.5 °C) 94 14 134/80 95 % 12/01/18 1339     95 % 12/01/18 1221 97.4 °F (36.3 °C) 63 16 111/73 96 % 12/01/18 1116 98.1 °F (36.7 °C) 99 18 111/79 92 % Intake/Output Summary (Last 24 hours) at 12/2/2018 1015 Last data filed at 12/2/2018 5449 Gross per 24 hour Intake 960 ml Output 2 ml Net 958 ml PHYSICAL EXAM: 
General: WD, WN. Awake, cooperative, no acute distress   
EENT:  EOMI. Anicteric sclerae. MMM Resp:  Coarse BS, rhonchi CV:  Regular  rhythm,  No edema GI:  Soft, Non distended, Non tender.  +Bowel sounds Neurologic:  Alert and oriented X 2, normal speech, Psych:   Fair   insight. Not anxious nor agitated Skin:  No rashes. No jaundice Reviewed most current lab test results and cultures  YES Reviewed most current radiology test results   YES Review and summation of old records today    NO Reviewed patient's current orders and MAR    YES 
PMH/SH reviewed - no change compared to H&P 
________________________________________________________________________ Care Plan discussed with: 
  Comments Patient y Family  y DTR Abby ARORA y   
Care Manager Consultant Multidiciplinary team rounds were held today with , nursing, pharmacist and clinical coordinator. Patient's plan of care was discussed; medications were reviewed and discharge planning was addressed. ________________________________________________________________________ Total NON critical care TIME:  25   Minutes Total CRITICAL CARE TIME Spent:   Minutes non procedure based Comments >50% of visit spent in counseling and coordination of care y ________________________________________________________________________ Ella Olivarez MD  
 
Procedures: see electronic medical records for all procedures/Xrays and details which were not copied into this note but were reviewed prior to creation of Plan. LABS: 
I reviewed today's most current labs and imaging studies. Pertinent labs include: 
Recent Labs 12/02/18 
0120 12/01/18 
4760 WBC 8.9 7.3 HGB 14.9 13.5 HCT 41.7 38.0  
 188 Recent Labs 12/02/18 
0120 12/01/18 
1669  139  
K 3.2* 3.5  107 CO2 26 24 * 79 BUN 14 19 CREA 0.74 0.76 CA 9.3 8.0*  
MG 2.2 2.0 ALB 3.2* 3.0* TBILI 0.7 1.0 SGOT 20 21 ALT 36 29 Signed: Ella Olivarez MD

## 2018-12-02 NOTE — PROGRESS NOTES
939 Alem Jackson 12 regarding patient's discharge placement status update. Waiting for family response per CM. [id number]  Haven't received call back from .

## 2018-12-02 NOTE — PROGRESS NOTES
ADULT PROTOCOL: JET AEROSOL  REASSESSMENT Patient  Kelvin Schilder     70 y.o.   male     12/2/2018  11:29 AM 
 
Breath Sounds Pre Procedure: Right Breath Sounds: Clear, Diminished Left Breath Sounds: Clear, Diminished Breath Sounds Post Procedure: Right Breath Sounds: Clear, Diminished Left Breath Sounds: Clear, Diminished Breathing pattern: Pre procedure Breathing Pattern: Regular Post procedure Breathing Pattern: Regular Heart Rate: Pre procedure Pulse: 88 
         Post procedure Pulse: 88 Resp Rate: Pre procedure Respirations: 16 Post procedure Respirations: 16 
 
     
 
Cough: Pre procedure Cough: Non-productive Post procedure Cough: Non-productive Oxygen: O2 Device: Room air   room air Changed: NO SpO2: Pre procedure SpO2: 93 %   without oxygen Post procedure SpO2: 94 %  without oxygen Nebulizer Therapy: Current medications Aerosolized Medications: DuoNeb Changed: YES/ TID and PRN Smoking History: current smoker Problem List:  
Patient Active Problem List  
Diagnosis Code  [facility] J44.1  Acute respiratory failure with hypoxia ([facility]) J96.01  
 Pulmonary emphysema ([facility]) J43.9  Multiple closed fractures of ribs of both sides with delayed healing S22.43XG  Respiratory distress R06.03  
 Tobacco abuse Z72.0  Anxiety state F41.1 Respiratory Therapist: Shahab Quintero RT

## 2018-12-02 NOTE — PROGRESS NOTES
ADULT PROTOCOL: JET AEROSOL ASSESSMENT Patient  Sanjay Rosenberg     70 y.o.   male     12/1/2018  8:38 PM 
 
Breath Sounds Pre Procedure: Right Breath Sounds: Diminished, Clear Left Breath Sounds: Diminished, Clear Breath Sounds Post Procedure: Right Breath Sounds: Diminished, Clear Left Breath Sounds: Diminished, Clear Breathing pattern: Pre procedure Breathing Pattern: Regular Post procedure Breathing Pattern: Regular Heart Rate: Pre procedure Pulse: 77 Post procedure Pulse: 61 Resp Rate: Pre procedure Respirations: 20 
         Post procedure Respirations: 20   
Cough: Pre procedure Cough: Non-productive Post procedure Cough: Non-productive Suctioned: NO Oxygen: O2 Device: Room air Changed: NO SpO2: Pre procedure SpO2: 90 %   without oxygen Post procedure SpO2: 95 %  without oxygen Nebulizer Therapy: Current medications Aerosolized Medications: DuoNeb Changed: NO Smoking History: Current smoker Problem List:  
Patient Active Problem List  
Diagnosis Code  [facility] exacerb[facility] of chronic [facility] J44.1  Acute respiratory failure with hypoxia ([facility]) J96.01  
 Pulmonary emphysema ([facility]) J43.9  Multiple closed fractures of ribs of both sides with delayed healing S22.43XG  Respiratory distress R06.03  
 Tobacco abuse Z72.0  Anxiety state F41.1 Respiratory Therapist: Jermaine Roberts RT

## 2018-12-03 ENCOUNTER — DOCUMENTATION ONLY (OUTPATIENT)
Dept: CASE MANAGEMENT | Age: 63
End: 2018-12-03

## 2018-12-03 VITALS
HEIGHT: 70 IN | OXYGEN SATURATION: 96 % | BODY MASS INDEX: 20.7 KG/M2 | HEART RATE: 79 BPM | WEIGHT: 144.62 LBS | RESPIRATION RATE: 17 BRPM | SYSTOLIC BLOOD PRESSURE: 148 MMHG | TEMPERATURE: 97.3 F | DIASTOLIC BLOOD PRESSURE: 85 MMHG

## 2018-12-03 PROCEDURE — 74011250637 HC RX REV CODE- 250/637: Performed by: INTERNAL MEDICINE

## 2018-12-03 PROCEDURE — 74011250637 HC RX REV CODE- 250/637: Performed by: NURSE PRACTITIONER

## 2018-12-03 PROCEDURE — 94760 N-INVAS EAR/PLS OXIMETRY 1: CPT

## 2018-12-03 PROCEDURE — 74011000250 HC RX REV CODE- 250: Performed by: INTERNAL MEDICINE

## 2018-12-03 PROCEDURE — 94640 AIRWAY INHALATION TREATMENT: CPT

## 2018-12-03 PROCEDURE — 74011636637 HC RX REV CODE- 636/637: Performed by: INTERNAL MEDICINE

## 2018-12-03 RX ADMIN — Medication 100 MG: at 08:49

## 2018-12-03 RX ADMIN — THERA TABS 1 TABLET: TAB at 08:48

## 2018-12-03 RX ADMIN — GUAIFENESIN 600 MG: 600 TABLET, EXTENDED RELEASE ORAL at 08:49

## 2018-12-03 RX ADMIN — PREDNISONE 20 MG: 20 TABLET ORAL at 08:49

## 2018-12-03 RX ADMIN — FOLIC ACID 1 MG: 1 TABLET ORAL at 08:48

## 2018-12-03 RX ADMIN — FAMOTIDINE 20 MG: 20 TABLET ORAL at 08:48

## 2018-12-03 RX ADMIN — Medication 10 ML: at 05:50

## 2018-12-03 RX ADMIN — POLYETHYLENE GLYCOL 3350 17 G: 17 POWDER, FOR SOLUTION ORAL at 08:49

## 2018-12-03 RX ADMIN — IPRATROPIUM BROMIDE AND ALBUTEROL SULFATE 3 ML: .5; 3 SOLUTION RESPIRATORY (INHALATION) at 08:09

## 2018-12-03 NOTE — PROGRESS NOTES
[place] [phone] ([phone]) [facility]: [phone] ([phone]) Palliative Medicine team was asked to see patient last week, due to his non-compliance with medications, refusal to take them and the medical team wanting to know about his Goals of Care. When LCSW went to see patient last week, patient was delirious, was unable to engage in information sharing and was not comfortable. Today patient is up and about, walking and standing in his room, a bit anxious about leaving the hospital, waiting for his friend to come and pick him up to take him to rehab. Patient is alert, oriented X 3, was friendly, appropriate in his conversations, he has a sense of humor, sometimes he is self-deprecating, other times he jokes. He appears to rely on his harley in hard times, he noted several times that he will \"leave it up to the Lord\". Patient was somewhat anxious, a bit on the hyperactive scale, he denied having a counselor at home that he meets with. Patient did admit to drinking and abusing alcohol. When asked, he shared that he had his last drink on 8/24, when he fell and \"broke some ribs\". He indicates that he has \"learned my lesson and I have decided I don't want to drink anymore\". 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 He lives with some room-mates, but describes himself as a \"loner- I don't like to bother with anyone\". Patient was open to completing and discussing his goals of care, and completed an AMD and DDNR. He shared that his (step)daughter John Aaron, be his mPOA. \"She helps me more than my own daughter helps me\". He did not want anyone else named. (See ACP note). Patient was glad to have these documents completed. He was very clear he did not want any aggressive interventions if he is at the end of life and he would not want to be resuscitated. LCSW will contact Abby to inform her of above. (Phone call made and message left regarding AMD, to call LCSW with her address so documents can be mailed).

## 2018-12-03 NOTE — PROGRESS NOTES
The objective of todays visit was to review the transitional care plan with the patient. We discussed the importance of transitional care as it relates to reducing the likelihood of readmission. We reviewed the goals for the first 30 days following hospital discharge. The patient and I discussed wrap around services including nurse navigation, care coordination, home health, transitional care appointments with their primary care provider and specialist team and the role of dispatch health. Patient education focused on readmission zones as described as  The Red Zone: High risk for readmission, days 1-21  The Yellow Zone: Moderate risk for readmission, days 22-29  The Green Zone: Lower risk for readmission, days 30 and after    Medication reconciliation was performed. In addition, the patient was instructed on worrisome symptoms for worsening of their medical conditions and sepsis. Learning was assessed using teach back in the form of role playing. The patient identified appropriate wrap around services during this interaction. A written individual care plan was reviewed with the patient as well today. The patient expressed the following specific concerns regarding their discharge  None  Pt anticipates discharge today for short term rehab- planning to go to [facility]. Reminded pt that smoking could trigger re-occurence of his COPD. Pt states he believes he does have advanced care plan document at home designating his daughter as HCP.  I requested that he verify that he does have ACP documentation in case of future health care crisis that may leave him unable to communicate his health care wishes to provider team

## 2018-12-03 NOTE — DISCHARGE SUMMARY
Hospitalist Discharge Summary     Patient ID:  Maranda Wu  [id number]  84 y.o.  1955    PCP on record: Lavon Hughes MD    Admit date: 11/26/2018  Discharge date and time: 12/3/2018      DISCHARGE DIAGNOSIS:    Respiratory Failure, COPD, Falls, Encephalopathy      CONSULTATIONS:  IP CONSULT TO PSYCHIATRY  IP CONSULT TO PALLIATIVE CARE - PROVIDER    Excerpted HPI from H&P of Trixie Lopez MD:  Maranda Wu is a 70 y.o.  male who presents with above complains from home via EMS. Pt presents with CC of worsening SOB x 1 week  H/o associated with cough with minimal sputum  H/o chest pain - more so splinting from the pain on coughing due to recent Rib fractures s/p fall  H/o smoking all his life, has been working on cutting down on smokes- now 1 pack every 3 days  Denies any fever, chills  Pt was found to be severely hypoxic & increased work of breathing by EMS- was put on BIPAP in ER- improved but was unable to be weaned off it to NC- back on BIPAP now  We were asked to admit for work up and evaluation of the above problems. ______________________________________________________________________  DISCHARGE SUMMARY/HOSPITAL COURSE:  for full details see H&P, daily progress notes, labs, consult notes. Acute Respiratory Failure with Hypoxia POA c/w supplemental O2, wean down as tolerated. So far tolerating room air, lungs sound better  Acute COPD exacerbation POA completing Z-max, bronchodilators, mucolytics, steroids switched to PO taper down, monitor.   H/o Chronic Emphysema from Tobacco Abuse  Still smoking found smoking in the room in the ER, counseled  H/o Recent Falls with subsequent Bilateral Rib Fractures- Subacute  CXR no acute ASD, Chronic emphysema noted, Subacute bilateral Rib Fractures noted  Initial ABG= 7.35/47/146/Bicarb 25, 99% on 40% FIO2 on BIPAP  Encephalopathy: seems close to baseline, monitor, much better now, no further Psychiatry recommendations. May have some degree of dementia  Code Status: Full  Surrogate Decision Maker: daughter OXDFW  [phone]  DVT Prophylaxis: SQ lovenox  GI Prophylaxis: IV pepcid for now  Baseline: Pt is independent with ADLs at baseline, not on oxygen at home, is a smoker but is cutting down on his cigarettes & has been down 1 pack every 3 days now     D/c to SNF today, F/U with PCP  _______________________________________________________________________  Patient seen and examined by me on discharge day. Pertinent Findings:  Gen:    Not in distress  Chest: Coarse BS, rhonchi  CVS:   Regular rhythm. No edema  Abd:  Soft, not distended, not tender  Neuro:  Alert, GCS [id number]  _______________________________________________________________________  DISCHARGE MEDICATIONS:   Current Discharge Medication List      START taking these medications    Details   acetaminophen (TYLENOL) 325 mg tablet Take 2 Tabs by mouth every four (4) hours as needed. Qty: 40 Tab, Refills: 0      albuterol-ipratropium (DUO-NEB) 2.5 mg-0.5 mg/3 ml nebu 3 mL by Nebulization route every four (4) hours as needed. COPD J44.9  Qty: 100 Nebule, Refills: 1      chlorpheniramine-HYDROcodone (TUSSIONEX) 10-8 mg/5 mL suspension Take 5 mL by mouth every twelve (12) hours as needed for Cough. Max Daily Amount: 10 mL. Qty: 100 mL, Refills: 0    Associated Diagnoses: COPD exacerbation (HCC)      famotidine (PEPCID) 20 mg tablet Take 1 Tab by mouth two (2) times a day. Qty: 60 Tab, Refills: 1      folic acid (FOLVITE) 1 mg tablet Take 1 Tab by mouth daily. Qty: 30 Tab, Refills: 1      guaiFENesin ER (MUCINEX) 600 mg ER tablet Take 1 Tab by mouth every twelve (12) hours. Qty: 20 Tab, Refills: 0      melatonin 3 mg tablet Take 1 Tab by mouth nightly. Qty: 30 Tab, Refills: 0      nicotine (NICODERM CQ) 14 mg/24 hr patch 1 Patch by TransDERmal route daily for 30 days.   Qty: 30 Patch, Refills: 0      predniSONE (DELTASONE) 10 mg tablet Take 10 mg by mouth Multiple. Take 2 tab po daily for 3 days then  Take 1 tab po daily for 3 days then  Take 1/2 tab po daily for 3 days then stop  Qty: 12 Tab, Refills: 0      Thiamine Mononitrate (B-1) 100 mg tablet Take 1 Tab by mouth daily. Qty: 30 Tab, Refills: 1         CONTINUE these medications which have CHANGED    Details   albuterol (PROVENTIL HFA, VENTOLIN HFA, PROAIR HFA) 90 mcg/actuation inhaler Take 2 Puffs by inhalation every four (4) hours as needed for Wheezing. COPD J44.9  Qty: 1 Inhaler, Refills: 1         STOP taking these medications       albuterol (ACCUNEB) 1.25 mg/3 mL nebu Comments:   Reason for Stopping:               My Recommended Diet, Activity, Wound Care, and follow-up labs are listed in the patient's Discharge Insturctions which I have personally completed and reviewed.     ______________________________________________________________________    Risk of deterioration: Moderate    Condition at Discharge:  Stable  ______________________________________________________________________    Disposition  SNF/LTC  ______________________________________________________________________    Care Plan discussed with:   Patient, Family, RN, Care Manager, Consultant    ______________________________________________________________________    Code Status: Full Code  ______________________________________________________________________      Follow up with:   PCP : Soy Mckenzie MD  Follow-up Information     Follow up With Specialties Details Why Contact Info    Soy Mckenzie MD Internal Medicine   [facility] [address]  [phone]          F/U PCP      Total time in minutes spent coordinating this discharge (includes going over instructions, follow-up, prescriptions, and preparing report for sign off to her PCP) :  35 minutes    Signed:  Ama Babcock MD

## 2018-12-03 NOTE — PROGRESS NOTES
Patient discharge to [facility] . Pt roommate transport him to SNF. IV removed prior to discharge. Discharge instructions, eMAR, prescriptions given to patient

## 2018-12-03 NOTE — PROGRESS NOTES
Events of the weekend and last week noted. I spoke with pt who would [facility] as he lives in [facility]. Chart sent to them to assess for admission today after lunch if all in agreement. [facility] can accept today. Please call report to [phone], send emar, d/c instructions, and Rx for narcotics if any. I called roommate Josefina Wong who states he is in [place] today and he will call other roommate, Tarik Barillas, at [phone] to transport pt.   
 
Miller Bolton will transport the pt to the SNF in the next hours.

## 2018-12-03 NOTE — DISCHARGE INSTRUCTIONS
HOSPITALIST DISCHARGE INSTRUCTIONS    NAME: Adolfo English   :  1955   MRN:  [id number]     Date/Time:  12/3/2018 9:56 AM    ADMIT DATE: 2018   DISCHARGE DATE: 12/3/2018     Attending Physician: Andressa Cartwright MD    DISCHARGE DIAGNOSIS:  Respiratory Failure, COPD, Falls, Encephalopathy      Medications: Per above medication reconciliation. Pain Management: per above medications    Recommended diet: Regular Diet    Recommended activity: Activity as tolerated    Wound care: None    Indwelling devices:  None    Supplemental Oxygen: None    Required Lab work: Per SNF routine    Glucose management:  None    Code status: Full        Outside physician follow up: Follow-up Information     Follow up With Specialties Details Why Contact Info    Camryn Bill MD Internal Medicine   [place] [address]  [phone]          F/U PCP         Skilled nursing facility/ SNF MD responsible for above on discharge. Information obtained by :  I understand that if any problems occur once I am at home I am to contact my physician. I understand and acknowledge receipt of the instructions indicated above.                                                                                                                                            Physician's or R.N.'s Signature                                                                  Date/Time                                                                                                                                              Patient or Repres

## 2018-12-03 NOTE — PROGRESS NOTES
Hospital to SNF SBAR Handoff - Bj Lino 
                                                                      70 y.o.   male [address]   Room: [facility]3/01    [facility] [address]  Unit Phone# :  [phone] [address] 
[facility] [place] [address] [address] Dept: [phone] Loc: [id number] SITUATION Admitted:  11/26/2018         Attending Provider:  Yaneli Landry MD    
 
Consultations:  IP 1305 [facility] CONSULT TO PALLIATIVE CARE - PROVIDER 
 
PCP:  Meghan Ortiz MD   [phone] Treatment Team: Attending Provider: Yaneli Landry MD; Utilization Review: Jeff Diaz; Consulting Provider: Se Salazar MD; Care Manager: Seymour Meraz; Consulting Provider: Kendy Schuster NP; Care Manager: Luis Daniel Mitchell RN Admitting Dx:  Acute exacerbation of chronic obstructive pulmonary disease (COPD) ([facility] [address] Acute respiratory failure with hypoxemia (HCC) Principal Problem: <principal problem not specified> * No surgery found * of  
  
BY: * Surgery not found *             ON: * No surgery found * Code Status: DNR Advance Directives:  
Advance Care Planning 11/28/2018 Confirm Advance Directive None Patient Would Like to Complete Advance Directive No  
 (Send w/patient) Not Received Isolation:  There are currently no Active Isolations       MDRO: No current active infections Pain Medications given  no Special Equipment needed: no  Type of equipment: 
 
  
  BACKGROUND Allergies: 
No Known Allergies Past Medical History:  
Diagnosis Date  Asthma  COPD (chronic obstructive pulmonary disease) ([facility]) History reviewed. No pertinent surgical history. Medications Prior to Admission Medication Sig  
 albuterol (ACCUNEB) 1.25 mg/3 mL nebu Take 3 mL by inhalation every four (4) hours as needed (wheezing).  [DISCONTINUED] albuterol (PROVENTIL HFA, VENTOLIN HFA, PROAIR HFA) 90 mcg/actuation inhaler Take 2 Puffs by inhalation every four (4) hours as needed for Wheezing. Hard scripts included in transfer packet yes Vaccinations: There is no immunization history on file for this patient. Readmission Risks:    Known Risks: Fall The Charlson [employer] Index tool is an evidenced based tool that has more automatic generated information. The tool looks at many different items such as the age of the patient, how many times they were admitted in the last calendar year, current length of stay in the hospital and their diagnosis. All of these items are pulled automatically from information documented in the chart from various places and will generate a score that predicts whether a patient is at low (less than 13), medium (13-20) or high (21 or greater) risk of being readmitted. ASSESSMENT Temp: 97.3 °F (36.3 °C) (12/03/18 0823) Pulse (Heart Rate): 79 (12/03/18 0823) Resp Rate: 17 (12/03/18 0823)           BP: 148/85 (12/03/18 4996) O2 Sat (%): 96 % (12/03/18 0823) Weight: 65.6 kg (144 lb 10 oz)    Height: 5' 10\" (177.8 cm) (11/26/18 1843) If above not within 1 hour of discharge: 
 
BP:_____  P:____  R:____ T:_____ O2 Sat: ___%  O2: ______ Active Orders Diet DIET REGULAR Orientation: oriented to time, place, person and situation Active Behaviors: none Active Lines/Drains:  (Peg Tube / Sarkar / CL or S/L?): no 
 
Urinary Status: Voiding, Bathroom privileges     Last BM: Last Bowel Movement Date: (can't recall) Skin Integrity: Abrasion(L hand s/p fall) Mobility: No limitations Weight Bearing Status: WBAT (Weight Bearing as Tolerated) Gait Training Assistive Device: Gait belt Ambulation - Level of Assistance: Contact guard assistance, Assist x1, Additional time Distance (ft): 320 Feet (ft) Lab Results Component Value Date/Time Glucose 112 (H) 12/02/2018 01:20 AM  
 INR 1.0 08/24/2018 11:28 PM  
 INR 1.0 01/19/2018 09:04 PM  
 HGB 14.9 12/02/2018 01:20 AM  
 HGB 13.5 12/01/2018 03:51 AM  
  
  RECOMMENDATION See After Visit Summary (AVS) for: · Discharge instructions · After [address] · Special equipment needed (entered pre-discharge by Care Management) · Medication Reconciliation · Follow up Appointment(s) Report given/sent by:  Te Weston Verbal report given to: [facility] RN,Frank VOGEL  FAXED to:  frank VOGEL Estimated discharge time:  12/3/2018 at   1339

## 2018-12-03 NOTE — CONSULTS
Palliative Medicine Consult  Je: [phone] ([phone])    Patient Name: Azalia De  YOB: 1955    Date of Initial Consult: 12/3/18   Reason for Consult: other  Requesting Provider: Edgar Kaufman  Primary Care Physician: Demetri Bahkta MD     SUMMARY:   Azalia De is a 70 y.o. male  with a past history of COPD/Emphysema from chronic smoking , recent fall  With bilateral  Rib fracture, who was admitted on 11/26/2018 from home with a diagnosis of sob , due to acute hypoxic respiratory failure secondary to copd exacerbation. Current medical issues leading to Palliative Medicine involvement include: patient is refusing medication ,  Advanced COPD, No AMD    Lives with room jean, jakob , has three childern , contact point is Burton Castañeda /step daughter. patient has h/o chronic alcohol drinker  , quit in august of 2018 , after he fell and had fracture ribs,. PALLIATIVE DIAGNOSES:   1. Advance directives  counseling and discussion    2. COPD exacerbation    3. Chronic smoker            PLAN:   1. Met with patient along with Nishi Connelly. 2. Explained palliative care service. 3. Patient to be d/c in an hour to home. 4. Patient is in good spirits , looking forward to be picked up by    5. Advance directives : explained , living will and appointing health care proxy , he completed , appointe his daughter Gerry Ford as her MPOA,  he does not want life prolonging traetment if he has terminal condition , or unaware of hsi situation , he \" relies on GOD\", and wants to go peacefully , decided  For do not attempt resuscitation if his heart \" stops \",  DNR order palced on chart , DDNR signed by him ( Refer to [address] note)  6. Initial consult note routed to primary continuity provider  7.  Communicated plan of care with: Palliative IDT       GOALS OF CARE / TREATMENT PREFERENCES:     GOALS OF CARE:  Patient/Health Care Proxy Stated Goals: Rehabilitation      TREATMENT PREFERENCES:   Code Status: Full Code    Advance Care Planning:  [] The [facility] Interdisciplinary Team has updated the ACP Navigator with [address] and Patient Capacity    Primary Decision [address]): Abby Martinez  Relationship to patient: step daughter  Phone number: [phone]  [x] Named in a scanned document   [] Legal Next of Kin  [] Guardian    Secondary Decision Maker ([address]):   Relationship to patient:  Phone number:  [] Named in a scanned document   [] Legal Next of Kin  [] Guardian    Medical Interventions: Limited additional interventions   Other Instructions: Other:    As far as possible, the palliative care team has discussed with patient / health care proxy about goals of care / treatment preferences for patient. HISTORY:     History obtained from: patient and chart. CHIEF COMPLAINT: admitted for above. HPI/SUBJECTIVE:    The patient is:   [] Verbal and participatory  Patient is currently ready to be discharged , awaiting to be picked up by his .     Clinical Pain Assessment (nonverbal scale for severity on nonverbal patients):   Clinical Pain Assessment  Severity: 0          Duration: for how long has pt been experiencing pain (e.g., 2 days, 1 month, years)  Frequency: how often pain is an issue (e.g., several times per day, once every few days, constant)     FUNCTIONAL ASSESSMENT:     Palliative Performance Scale (PPS):  PPS: 60       PSYCHOSOCIAL/SPIRITUAL SCREENING:     Palliative IDT has assessed this patient for cultural preferences / practices and a referral made as appropriate to needs (Cultural Services, Patient Advocacy, Ethics, etc.)    Any spiritual / Hindu concerns:  [] Yes /  [] No    Caregiver Burnout:  [] Yes /  [] No /  [] No Caregiver Present      Anticipatory grief assessment:   [] Normal  / [] Maladaptive       ESAS Anxiety: Anxiety: 0    ESAS Depression: Depression: 0        REVIEW OF SYSTEMS:     Positive and pertinent negative findings in ROS are noted above in HPI. The following systems were [] reviewed / [] unable to be reviewed as noted in HPI  Other findings are noted below. Systems: constitutional, ears/nose/mouth/throat, respiratory, gastrointestinal, genitourinary, musculoskeletal, integumentary, neurologic, psychiatric, endocrine. Positive findings noted below. Modified ESAS Completed by: provider   Fatigue: 4     Depression: 0 Pain: 0   Anxiety: 0 Nausea: 0   Anorexia: 0 Dyspnea: 0     Constipation: No     Stool Occurrence(s): 1        PHYSICAL EXAM:     From RN flowsheet:  Wt Readings from Last 3 Encounters:   12/01/18 144 lb 10 oz (65.6 kg)   08/24/18 184 lb 15.5 oz (83.9 kg)   08/24/18 185 lb (83.9 kg)     Blood pressure 148/85, pulse 79, temperature 97.3 °F (36.3 °C), resp. rate 17, height 5' 10\" (1.778 m), weight 144 lb 10 oz (65.6 kg), SpO2 96 %. Pain Scale 1: Numeric (0 - 10)  Pain Intensity 1: 0                 Last bowel movement, if known:     Constitutional: thin built , not in any distress, dress in regular clothes, to leave home. Eyes: pupils equal, anicteric  ENMT: no nasal discharge, moist mucous membranes  Cardiovascular: regular rhythm, distal pulses intact  Respiratory: breathing not labored, symmetric  Gastrointestinal: soft non-tender, +bowel sounds  Musculoskeletal: no deformity, no tenderness to palpation  Skin: warm, dry  Neurologic: alert ,awke oriented .   Psychiatric: full affect, no hallucinations  Other:       HISTORY:     Active Problems:    Acute exacerbation of chronic obstructive pulmonary disease (COPD) ([facility] [address] (11/26/2018)      Acute respiratory failure with hypoxia ([facility] [address] (11/26/2018)      Pulmonary emphysema ([facility] [address] (11/26/2018)      Multiple closed fractures of ribs of both sides with delayed healing (11/26/2018)      Respiratory distress (11/26/2018)      Tobacco abuse (11/26/2018)      Anxiety state (11/26/2018)      Past Medical History:   Diagnosis Date    Asthma     COPD (chronic obstructive pulmonary disease) ([facility] [address]       History reviewed. No pertinent surgical history. History reviewed. No pertinent family history. History reviewed, no pertinent family history.   Social History     Tobacco Use    Smoking status: Current Every Day Smoker     Packs/day: 0.50    Smokeless tobacco: Never Used   Substance Use Topics    Alcohol use: Yes     Comment: 2 bottles of wine x 40 years, 24 beers per day     No Known Allergies   Current Facility-Administered Medications   Medication Dose Route Frequency    albuterol-ipratropium (DUO-NEB) 2.5 MG-0.5 MG/3 ML  3 mL Nebulization TID RT    predniSONE (DELTASONE) tablet 20 mg  20 mg Oral DAILY WITH BREAKFAST    polyethylene glycol (MIRALAX) packet 17 g  17 g Oral DAILY    folic acid (FOLVITE) tablet 1 mg  1 mg Oral DAILY    Thiamine Mononitrate (B-1) tablet 100 mg  100 mg Oral DAILY    therapeutic multivitamin (THERAGRAN) tablet 1 Tab  1 Tab Oral DAILY    famotidine (PEPCID) tablet 20 mg  20 mg Oral BID    chlorpheniramine-HYDROcodone (TUSSIONEX) oral suspension 5 mL  5 mL Oral Q12H PRN    LORazepam (ATIVAN) injection 1 mg  1 mg IntraVENous Q8H PRN    nicotine (NICODERM CQ) 14 mg/24 hr patch 1 Patch  1 Patch TransDERmal DAILY    acetaminophen (TYLENOL) tablet 650 mg  650 mg Oral Q4H PRN    haloperidol lactate (HALDOL) injection 5 mg  5 mg IntraVENous Q4H PRN    melatonin tablet 3 mg  3 mg Oral QHS    guaiFENesin ER (MUCINEX) tablet 600 mg  600 mg Oral Q12H    guaiFENesin-dextromethorphan (ROBITUSSIN DM) 100-10 mg/5 mL syrup 10 mL  10 mL Oral Q6H PRN    sodium chloride (NS) flush 5-10 mL  5-10 mL IntraVENous Q8H    sodium chloride (NS) flush 5-10 mL  5-10 mL IntraVENous PRN    ondansetron (ZOFRAN) injection 4 mg  4 mg IntraVENous Q4H PRN    enoxaparin (LOVENOX) injection 40 mg  40 mg SubCUTAneous Q24H    morphine injection 1 mg  1 mg IntraVENous Q4H PRN          LAB AND IMAGING FINDINGS:     Lab Results   Component Value Date/Time    WBC 8.9 12/02/2018 01:20 AM    HGB 14.9 12/02/2018 01:20 AM    PLATELET 567 26/37/8541 01:20 AM     Lab Results   Component Value Date/Time    Sodium 140 12/02/2018 01:20 AM    Potassium 3.2 (L) 12/02/2018 01:20 AM    Chloride 107 12/02/2018 01:20 AM    CO2 26 12/02/2018 01:20 AM    BUN 14 12/02/2018 01:20 AM    Creatinine 0.74 12/02/2018 01:20 AM    Calcium 9.3 12/02/2018 01:20 AM    Magnesium 2.2 12/02/2018 01:20 AM      Lab Results   Component Value Date/Time    AST (SGOT) 20 12/02/2018 01:20 AM    Alk. phosphatase 55 12/02/2018 01:20 AM    Protein, total 6.9 12/02/2018 01:20 AM    Albumin 3.2 (L) 12/02/2018 01:20 AM    Globulin 3.7 12/02/2018 01:20 AM     Lab Results   Component Value Date/Time    INR 1.0 08/24/2018 11:28 PM    Prothrombin time 10.3 08/24/2018 11:28 PM      No results found for: IRON, FE, TIBC, IBCT, PSAT, FERR   Lab Results   Component Value Date/Time    pH 7.45 11/28/2018 12:38 AM    PCO2 39 11/28/2018 12:38 AM    PO2 73 (L) 11/28/2018 12:38 AM     No components found for: GLPOC   No results found for: CPK, CKMB             Total time:   Counseling / coordination time, spent as noted above:   > 50% counseling / coordination?:     Prolonged service was provided for  []30 min   []75 min in face to face time in the presence of the patient, spent as noted above. Time Start:   Time End:   Note: this can only be billed with 38829 (initial) or 32986 (follow up). If multiple start / stop times, list each separately.

## 2018-12-03 NOTE — PROGRESS NOTES
Bedside and Verbal shift change report given to Carolynn ARORA (oncoming nurse) by Caity Byrne (offgoing nurse). Report included the following information SBAR, Kardex, Intake/Output, MAR and Accordion.

## 2018-12-03 NOTE — PROGRESS NOTES
Bedside shift change report given to [facility], RN (oncoming nurse) by Louisa Felix RN (offgoing nurse). Report included the following information SBAR, Kardex, Intake/Output, MAR and Recent Results.

## 2018-12-04 ENCOUNTER — PATIENT OUTREACH (OUTPATIENT)
Dept: CASE MANAGEMENT | Age: 63
End: 2018-12-04

## 2018-12-04 NOTE — PROGRESS NOTES
Community Care Team documentation for patient in [facility] Initial Follow Up Patient was admitted to [facility] on 11/26/18 and discharged 12/3/18 to [address] [place] [address]. Hospital Discharge diagnosis:  Acute respiratory failure with hypoxia RRAT score: 11 Advance Care Planning:  DDNR and Advance Directive on file. PCP : Abi Monae MD 
 
SNF Attending:  Judi Duarte MD 
 
Spoke with SNF team.  Ensured patient arrived to SNF safely with admission packet in order. PT/OT evaluations to be completed today, 12/4. Care plan meeting to be held at [facility] tomorrow, 12/5, to discuss disposition. Discussed with SNF - new DDNR and Advance Directive completed during hospitalization. PTA, patient lives with roommate in one story home with four entry steps and was independent with ADLs/IADLs. Disposition/ LOS TBD. Community Care Team will follow up weekly with [facility] until discharge. Medications were not reconciled and general patient assessment was not completed during this [facility] outreach. Deshawn Snyder, MSN, RN, ACNS-BC, [facility] Nurse Navigator, [address] [place] [address] [phone]

## 2018-12-11 ENCOUNTER — PATIENT OUTREACH (OUTPATIENT)
Dept: CASE MANAGEMENT | Age: 63
End: 2018-12-11

## 2018-12-11 NOTE — PROGRESS NOTES
Community Care Team Documentation for Patient in [facility] Subsequent Follow up Patient remains at [facility] ([facility]). See previous [facility] Team notes. PCP : Akil Cornejo MD 
 
Spoke with SNF team.  PT/OT continue. Currently ambulating at modified independent level with frequent rest breaks. Working on balance issues. Patient has requested to discharge prior to copay days beginning. Plan for discharge 12/20 to home with roommate. No home health recommended. Medications were not reconciled and general patient assessment was not completed during this skilled nursing facility outreach. Julieth Snyder, MSN, RN, ACNS-BC, [facility] Nurse Navigator, [address] [place] [address] [phone]

## 2018-12-18 ENCOUNTER — PATIENT OUTREACH (OUTPATIENT)
Dept: CASE MANAGEMENT | Age: 63
End: 2018-12-18

## 2018-12-18 NOTE — PROGRESS NOTES
Community Care Team Documentation for Patient in [facility]  Discharge Note    Patient has been discharged from [facility] ([facility]). See previous [facility] Team notes. PCP : Rahul Benavidez MD    Spoke with SNF team. Patient discharged 12/15 per pt choice. No HH or DME recommended. Community Care Team will sign off at this time. Medications were not reconciled and general patient assessment was not completed during this skilled nursing facility outreach.

## 2021-06-18 ENCOUNTER — APPOINTMENT (OUTPATIENT)
Dept: CT IMAGING | Age: 66
DRG: 190 | End: 2021-06-18
Attending: PHYSICIAN ASSISTANT
Payer: OTHER GOVERNMENT

## 2021-06-18 ENCOUNTER — APPOINTMENT (OUTPATIENT)
Dept: GENERAL RADIOLOGY | Age: 66
DRG: 190 | End: 2021-06-18
Attending: PHYSICIAN ASSISTANT
Payer: OTHER GOVERNMENT

## 2021-06-18 ENCOUNTER — HOSPITAL ENCOUNTER (INPATIENT)
Age: 66
LOS: 1 days | Discharge: HOME | DRG: 190 | End: 2021-06-21
Attending: INTERNAL MEDICINE | Admitting: INTERNAL MEDICINE
Payer: OTHER GOVERNMENT

## 2021-06-18 DIAGNOSIS — E87.1 HYPONATREMIA: ICD-10-CM

## 2021-06-18 DIAGNOSIS — J44.1 COPD EXACERBATION (HCC): Primary | ICD-10-CM

## 2021-06-18 LAB
ALBUMIN SERPL-MCNC: 4.6 G/DL (ref 3.5–5)
ALBUMIN/GLOB SERPL: 1.4 {RATIO} (ref 1.1–2.2)
ALP SERPL-CCNC: 45 U/L (ref 45–117)
ALT SERPL-CCNC: 68 U/L (ref 12–78)
ANION GAP SERPL CALC-SCNC: 11 MMOL/L (ref 5–15)
AST SERPL W P-5'-P-CCNC: 152 U/L (ref 15–37)
ATRIAL RATE: 84 BPM
BASE DEFICIT BLDV-SCNC: 3.6 MMOL/L (ref 0–2)
BASOPHILS # BLD: 0 K/UL (ref 0–0.1)
BASOPHILS NFR BLD: 0 % (ref 0–1)
BDY SITE: ABNORMAL
BILIRUB SERPL-MCNC: 0.9 MG/DL (ref 0.2–1)
BNP SERPL-MCNC: 88 PG/ML
BUN SERPL-MCNC: 7 MG/DL (ref 6–20)
BUN/CREAT SERPL: 9 (ref 12–20)
CA-I BLD-MCNC: 8.7 MG/DL (ref 8.5–10.1)
CALCULATED P AXIS, ECG09: 79 DEGREES
CALCULATED R AXIS, ECG10: 44 DEGREES
CALCULATED T AXIS, ECG11: 66 DEGREES
CHLORIDE SERPL-SCNC: 89 MMOL/L (ref 97–108)
CO2 SERPL-SCNC: 19 MMOL/L (ref 21–32)
CREAT SERPL-MCNC: 0.82 MG/DL (ref 0.7–1.3)
DIAGNOSIS, 93000: NORMAL
DIFFERENTIAL METHOD BLD: ABNORMAL
EOSINOPHIL # BLD: 0 K/UL (ref 0–0.4)
EOSINOPHIL NFR BLD: 0 % (ref 0–7)
ERYTHROCYTE [DISTWIDTH] IN BLOOD BY AUTOMATED COUNT: 11.5 % (ref 11.5–14.5)
ETHANOL SERPL-MCNC: <4 MG/DL
FIO2 ON VENT: 21 %
GLOBULIN SER CALC-MCNC: 3.2 G/DL (ref 2–4)
GLUCOSE SERPL-MCNC: 100 MG/DL (ref 65–100)
HCO3 BLDV-SCNC: 21 MMOL/L (ref 22–26)
HCT VFR BLD AUTO: 40.1 % (ref 36.6–50.3)
HGB BLD-MCNC: 15 G/DL (ref 12.1–17)
IMM GRANULOCYTES # BLD AUTO: 0.1 K/UL (ref 0–0.04)
IMM GRANULOCYTES NFR BLD AUTO: 1 % (ref 0–0.5)
LIPASE SERPL-CCNC: 77 U/L (ref 73–393)
LYMPHOCYTES # BLD: 1.3 K/UL (ref 0.8–3.5)
LYMPHOCYTES NFR BLD: 13 % (ref 12–49)
MCH RBC QN AUTO: 32.3 PG (ref 26–34)
MCHC RBC AUTO-ENTMCNC: 37.4 G/DL (ref 30–36.5)
MCV RBC AUTO: 86.4 FL (ref 80–99)
MONOCYTES # BLD: 0.8 K/UL (ref 0–1)
MONOCYTES NFR BLD: 8 % (ref 5–13)
NEUTS SEG # BLD: 7.8 K/UL (ref 1.8–8)
NEUTS SEG NFR BLD: 78 % (ref 32–75)
NRBC # BLD: 0 K/UL (ref 0–0.01)
NRBC BLD-RTO: 0 PER 100 WBC
P-R INTERVAL, ECG05: 170 MS
PCO2 BLDV: 35.5 MMHG (ref 40–50)
PH BLDV: 7.38 [PH] (ref 7.31–7.41)
PLATELET # BLD AUTO: 225 K/UL (ref 150–400)
PMV BLD AUTO: 9.1 FL (ref 8.9–12.9)
PO2 BLDV: 53 MMHG (ref 36–42)
POTASSIUM SERPL-SCNC: 4.1 MMOL/L (ref 3.5–5.1)
PROT SERPL-MCNC: 7.8 G/DL (ref 6.4–8.2)
Q-T INTERVAL, ECG07: 504 MS
QRS DURATION, ECG06: 90 MS
QTC CALCULATION (BEZET), ECG08: 595 MS
RBC # BLD AUTO: 4.64 M/UL (ref 4.1–5.7)
SAO2 % BLDV: 89 % (ref 60–80)
SAO2% DEVICE SAO2% SENSOR NAME: ABNORMAL
SODIUM SERPL-SCNC: 119 MMOL/L (ref 136–145)
TROPONIN I SERPL-MCNC: <0.05 NG/ML
TSH SERPL DL<=0.05 MIU/L-ACNC: 1.2 UIU/ML (ref 0.36–3.74)
VENTRICULAR RATE, ECG03: 84 BPM
WBC # BLD AUTO: 9.9 K/UL (ref 4.1–11.1)

## 2021-06-18 PROCEDURE — 83036 HEMOGLOBIN GLYCOSYLATED A1C: CPT

## 2021-06-18 PROCEDURE — 83690 ASSAY OF LIPASE: CPT

## 2021-06-18 PROCEDURE — 96375 TX/PRO/DX INJ NEW DRUG ADDON: CPT

## 2021-06-18 PROCEDURE — 85025 COMPLETE CBC W/AUTO DIFF WBC: CPT

## 2021-06-18 PROCEDURE — 99285 EMERGENCY DEPT VISIT HI MDM: CPT

## 2021-06-18 PROCEDURE — 74011000636 HC RX REV CODE- 636: Performed by: PHYSICIAN ASSISTANT

## 2021-06-18 PROCEDURE — 80053 COMPREHEN METABOLIC PANEL: CPT

## 2021-06-18 PROCEDURE — 82803 BLOOD GASES ANY COMBINATION: CPT

## 2021-06-18 PROCEDURE — 94640 AIRWAY INHALATION TREATMENT: CPT

## 2021-06-18 PROCEDURE — 84484 ASSAY OF TROPONIN QUANT: CPT

## 2021-06-18 PROCEDURE — 93005 ELECTROCARDIOGRAM TRACING: CPT

## 2021-06-18 PROCEDURE — 74011250636 HC RX REV CODE- 250/636: Performed by: PHYSICIAN ASSISTANT

## 2021-06-18 PROCEDURE — 80061 LIPID PANEL: CPT

## 2021-06-18 PROCEDURE — 74174 CTA ABD&PLVS W/CONTRAST: CPT

## 2021-06-18 PROCEDURE — 74011250637 HC RX REV CODE- 250/637: Performed by: STUDENT IN AN ORGANIZED HEALTH CARE EDUCATION/TRAINING PROGRAM

## 2021-06-18 PROCEDURE — 71275 CT ANGIOGRAPHY CHEST: CPT

## 2021-06-18 PROCEDURE — 84443 ASSAY THYROID STIM HORMONE: CPT

## 2021-06-18 PROCEDURE — 83880 ASSAY OF NATRIURETIC PEPTIDE: CPT

## 2021-06-18 PROCEDURE — 82077 ASSAY SPEC XCP UR&BREATH IA: CPT

## 2021-06-18 PROCEDURE — 96374 THER/PROPH/DIAG INJ IV PUSH: CPT

## 2021-06-18 PROCEDURE — 99218 HC RM OBSERVATION: CPT

## 2021-06-18 PROCEDURE — 74011250636 HC RX REV CODE- 250/636: Performed by: STUDENT IN AN ORGANIZED HEALTH CARE EDUCATION/TRAINING PROGRAM

## 2021-06-18 PROCEDURE — 74011000250 HC RX REV CODE- 250: Performed by: PHYSICIAN ASSISTANT

## 2021-06-18 PROCEDURE — 36415 COLL VENOUS BLD VENIPUNCTURE: CPT

## 2021-06-18 PROCEDURE — 71045 X-RAY EXAM CHEST 1 VIEW: CPT

## 2021-06-18 RX ORDER — IBUPROFEN 200 MG
1 TABLET ORAL DAILY
Status: DISCONTINUED | OUTPATIENT
Start: 2021-06-19 | End: 2021-06-21 | Stop reason: HOSPADM

## 2021-06-18 RX ORDER — SODIUM CHLORIDE 0.9 % (FLUSH) 0.9 %
5-40 SYRINGE (ML) INJECTION AS NEEDED
Status: DISCONTINUED | OUTPATIENT
Start: 2021-06-18 | End: 2021-06-21 | Stop reason: HOSPADM

## 2021-06-18 RX ORDER — PREDNISONE 20 MG/1
20 TABLET ORAL
Status: DISCONTINUED | OUTPATIENT
Start: 2021-06-19 | End: 2021-06-19

## 2021-06-18 RX ORDER — SODIUM CHLORIDE 9 MG/ML
75 INJECTION, SOLUTION INTRAVENOUS CONTINUOUS
Status: DISPENSED | OUTPATIENT
Start: 2021-06-18 | End: 2021-06-20

## 2021-06-18 RX ORDER — ONDANSETRON 2 MG/ML
4 INJECTION INTRAMUSCULAR; INTRAVENOUS
Status: DISCONTINUED | OUTPATIENT
Start: 2021-06-18 | End: 2021-06-21 | Stop reason: HOSPADM

## 2021-06-18 RX ORDER — IPRATROPIUM BROMIDE AND ALBUTEROL SULFATE 2.5; .5 MG/3ML; MG/3ML
3 SOLUTION RESPIRATORY (INHALATION)
Status: COMPLETED | OUTPATIENT
Start: 2021-06-18 | End: 2021-06-18

## 2021-06-18 RX ORDER — HYDRALAZINE HYDROCHLORIDE 20 MG/ML
20 INJECTION INTRAMUSCULAR; INTRAVENOUS
Status: DISCONTINUED | OUTPATIENT
Start: 2021-06-18 | End: 2021-06-21 | Stop reason: HOSPADM

## 2021-06-18 RX ORDER — MONTELUKAST SODIUM 10 MG/1
10 TABLET ORAL
Status: DISCONTINUED | OUTPATIENT
Start: 2021-06-18 | End: 2021-06-21 | Stop reason: HOSPADM

## 2021-06-18 RX ORDER — ENOXAPARIN SODIUM 100 MG/ML
40 INJECTION SUBCUTANEOUS DAILY
Status: DISCONTINUED | OUTPATIENT
Start: 2021-06-19 | End: 2021-06-21 | Stop reason: HOSPADM

## 2021-06-18 RX ORDER — MORPHINE SULFATE 4 MG/ML
4 INJECTION INTRAVENOUS ONCE
Status: COMPLETED | OUTPATIENT
Start: 2021-06-18 | End: 2021-06-18

## 2021-06-18 RX ORDER — ACETAMINOPHEN 325 MG/1
650 TABLET ORAL
Status: DISCONTINUED | OUTPATIENT
Start: 2021-06-18 | End: 2021-06-21 | Stop reason: HOSPADM

## 2021-06-18 RX ORDER — SODIUM CHLORIDE 0.9 % (FLUSH) 0.9 %
5-40 SYRINGE (ML) INJECTION EVERY 8 HOURS
Status: DISCONTINUED | OUTPATIENT
Start: 2021-06-18 | End: 2021-06-21 | Stop reason: HOSPADM

## 2021-06-18 RX ORDER — ACETAMINOPHEN 650 MG/1
650 SUPPOSITORY RECTAL
Status: DISCONTINUED | OUTPATIENT
Start: 2021-06-18 | End: 2021-06-21 | Stop reason: HOSPADM

## 2021-06-18 RX ORDER — POLYETHYLENE GLYCOL 3350 17 G/17G
17 POWDER, FOR SOLUTION ORAL DAILY PRN
Status: DISCONTINUED | OUTPATIENT
Start: 2021-06-18 | End: 2021-06-21 | Stop reason: HOSPADM

## 2021-06-18 RX ORDER — ONDANSETRON 4 MG/1
4 TABLET, ORALLY DISINTEGRATING ORAL
Status: DISCONTINUED | OUTPATIENT
Start: 2021-06-18 | End: 2021-06-21 | Stop reason: HOSPADM

## 2021-06-18 RX ADMIN — SODIUM CHLORIDE 75 ML/HR: 9 INJECTION, SOLUTION INTRAVENOUS at 19:01

## 2021-06-18 RX ADMIN — IPRATROPIUM BROMIDE AND ALBUTEROL SULFATE 3 ML: .5; 3 SOLUTION RESPIRATORY (INHALATION) at 13:09

## 2021-06-18 RX ADMIN — MORPHINE SULFATE 4 MG: 4 INJECTION, SOLUTION INTRAMUSCULAR; INTRAVENOUS at 14:07

## 2021-06-18 RX ADMIN — Medication 10 ML: at 17:10

## 2021-06-18 RX ADMIN — IOPAMIDOL 100 ML: 755 INJECTION, SOLUTION INTRAVENOUS at 15:00

## 2021-06-18 RX ADMIN — METHYLPREDNISOLONE SODIUM SUCCINATE 125 MG: 125 INJECTION, POWDER, FOR SOLUTION INTRAMUSCULAR; INTRAVENOUS at 13:13

## 2021-06-18 RX ADMIN — IPRATROPIUM BROMIDE AND ALBUTEROL SULFATE 3 ML: .5; 3 SOLUTION RESPIRATORY (INHALATION) at 13:10

## 2021-06-18 RX ADMIN — MONTELUKAST 10 MG: 10 TABLET, FILM COATED ORAL at 23:48

## 2021-06-18 RX ADMIN — Medication 10 ML: at 23:48

## 2021-06-18 RX ADMIN — SODIUM CHLORIDE 1000 ML: 9 INJECTION, SOLUTION INTRAVENOUS at 17:11

## 2021-06-18 NOTE — ED PROVIDER NOTES
EMERGENCY DEPARTMENT HISTORY AND PHYSICAL EXAM      Date: 6/18/2021  Patient Name: Denzel Reyes    History of Presenting Illness     Chief Complaint   Patient presents with    Abdominal Pain    Shortness of Breath       History Provided By: Patient    HPI: Denzel Reyes, 77 y.o. male with a past medical history significant for asthma and COPD who presents to the ED with cc of gradual onset, constant, waxing/waning, sharp diffuse abdominal pain x2 days. Associated symptoms include shortness of breath. Denies any other symptoms. No particular alleviating or exacerbating symptoms. Has used a DuoNeb at home, with no relief. Denies any nausea, vomiting, chest pain. Hx otherwise limited secondary to respiratory distress. There are no other complaints, changes, or physical findings at this time. PCP: None    No current facility-administered medications on file prior to encounter. Current Outpatient Medications on File Prior to Encounter   Medication Sig Dispense Refill    acetaminophen (TYLENOL) 325 mg tablet Take 2 Tabs by mouth every four (4) hours as needed. 40 Tab 0    albuterol-ipratropium (DUO-NEB) 2.5 mg-0.5 mg/3 ml nebu 3 mL by Nebulization route every four (4) hours as needed. COPD J44.9 100 Nebule 1    chlorpheniramine-HYDROcodone (TUSSIONEX) 10-8 mg/5 mL suspension Take 5 mL by mouth every twelve (12) hours as needed for Cough. Max Daily Amount: 10 mL. 100 mL 0    famotidine (PEPCID) 20 mg tablet Take 1 Tab by mouth two (2) times a day. 60 Tab 1    folic acid (FOLVITE) 1 mg tablet Take 1 Tab by mouth daily. 30 Tab 1    guaiFENesin ER (MUCINEX) 600 mg ER tablet Take 1 Tab by mouth every twelve (12) hours. 20 Tab 0    melatonin 3 mg tablet Take 1 Tab by mouth nightly. 30 Tab 0    predniSONE (DELTASONE) 10 mg tablet Take 10 mg by mouth Multiple.  Take 2 tab po daily for 3 days then  Take 1 tab po daily for 3 days then  Take 1/2 tab po daily for 3 days then stop 12 Tab 0    Thiamine Mononitrate (B-1) 100 mg tablet Take 1 Tab by mouth daily. 30 Tab 1    albuterol (PROVENTIL HFA, VENTOLIN HFA, PROAIR HFA) 90 mcg/actuation inhaler Take 2 Puffs by inhalation every four (4) hours as needed for Wheezing. COPD J44.9 1 Inhaler 1       Past History     Past Medical History:  Past Medical History:   Diagnosis Date    Asthma     COPD (chronic obstructive pulmonary disease) ([facility] [address]        Past Surgical History:  No past surgical history on file. Family History:  No family history on file. Social History:  Social History     Tobacco Use    Smoking status: Current Every Day Smoker     Packs/day: 0.50    Smokeless tobacco: Never Used   Substance Use Topics    Alcohol use: Yes     Comment: 2 bottles of wine x 40 years, 24 beers per day    Drug use: No       Allergies:  No Known Allergies      Review of Systems     Review of Systems   Unable to perform ROS: Severe respiratory distress       Physical Exam     Physical Exam  Vitals and nursing note reviewed. Constitutional:       General: He is not in acute distress. Appearance: Normal appearance. He is well-developed and underweight. He is not ill-appearing, toxic-appearing or diaphoretic. HENT:      Head: Normocephalic and atraumatic. Nose: Nose normal. No congestion or rhinorrhea. Mouth/Throat:      Mouth: Mucous membranes are moist.      Pharynx: Oropharynx is clear. No oropharyngeal exudate or posterior oropharyngeal erythema. Eyes:      General: No scleral icterus. Conjunctiva/sclera: Conjunctivae normal.      Pupils: Pupils are equal, round, and reactive to light. Cardiovascular:      Rate and Rhythm: Normal rate and regular rhythm. Pulses:           Radial pulses are 2+ on the right side and 2+ on the left side. Dorsalis pedis pulses are 2+ on the right side and 2+ on the left side. Heart sounds: No murmur heard. No friction rub. No gallop.     Pulmonary:      Effort: Tachypnea, accessory muscle usage and respiratory distress present. No retractions. Breath sounds: No stridor. Wheezing and rhonchi present. No decreased breath sounds or rales. Comments: Speaking in few-word sentences  Chest:      Chest wall: No tenderness. Abdominal:      General: Bowel sounds are normal. There is no distension. Palpations: Abdomen is soft. There is no mass. Tenderness: There is no abdominal tenderness. There is no right CVA tenderness, left CVA tenderness, guarding or rebound. Musculoskeletal:         General: No deformity. Normal range of motion. Cervical back: Normal range of motion and neck supple. No rigidity. No muscular tenderness. Right lower leg: No edema. Left lower leg: No edema. Skin:     General: Skin is warm and dry. Capillary Refill: Capillary refill takes less than 2 seconds. Coloration: Skin is not jaundiced or pale. Findings: No bruising, erythema or rash. Neurological:      General: No focal deficit present. Mental Status: He is alert and oriented to person, place, and time. Mental status is at baseline. Sensory: Sensation is intact. Motor: Motor function is intact. Psychiatric:         Mood and Affect: Mood normal.         Behavior: Behavior normal. Behavior is cooperative. Thought Content:  Thought content normal.         Judgment: Judgment normal.         Lab and Diagnostic Study Results     Labs -     Recent Results (from the past 12 hour(s))   EKG, 12 LEAD, INITIAL    Collection Time: 06/18/21  1:06 PM   Result Value Ref Range    Ventricular Rate 84 BPM    Atrial Rate 84 BPM    P-R Interval 170 ms    QRS Duration 90 ms    Q-T Interval 504 ms    QTC Calculation (Bezet) 595 ms    Calculated P Axis 79 degrees    Calculated R Axis 44 degrees    Calculated T Axis 66 degrees    Diagnosis       Normal sinus rhythm  Nonspecific ST abnormality  Prolonged QT  Abnormal ECG  No previous ECGs available  Confirmed by Douglas Anaya ([id number]) on 6/18/2021 1:32:47 PM     CBC WITH AUTOMATED DIFF    Collection Time: 06/18/21  1:15 PM   Result Value Ref Range    WBC 9.9 4.1 - 11.1 K/uL    RBC 4.64 4.10 - 5.70 M/uL    HGB 15.0 12.1 - 17.0 g/dL    HCT 40.1 36.6 - 50.3 %    MCV 86.4 80.0 - 99.0 FL    MCH 32.3 26.0 - 34.0 PG    MCHC 37.4 (H) 30.0 - 36.5 g/dL    RDW 11.5 11.5 - 14.5 %    PLATELET 803 962 - 674 K/uL    MPV 9.1 8.9 - 12.9 FL    NRBC 0.0 0.0  WBC    ABSOLUTE NRBC 0.00 0.00 - 0.01 K/uL    NEUTROPHILS 78 (H) 32 - 75 %    LYMPHOCYTES 13 12 - 49 %    MONOCYTES 8 5 - 13 %    EOSINOPHILS 0 0 - 7 %    BASOPHILS 0 0 - 1 %    IMMATURE GRANULOCYTES 1 (H) 0 - 0.5 %    ABS. NEUTROPHILS 7.8 1.8 - 8.0 K/UL    ABS. LYMPHOCYTES 1.3 0.8 - 3.5 K/UL    ABS. MONOCYTES 0.8 0.0 - 1.0 K/UL    ABS. EOSINOPHILS 0.0 0.0 - 0.4 K/UL    ABS. BASOPHILS 0.0 0.0 - 0.1 K/UL    ABS. IMM. GRANS. 0.1 (H) 0.00 - 0.04 K/UL    DF AUTOMATED     METABOLIC PANEL, COMPREHENSIVE    Collection Time: 06/18/21  1:15 PM   Result Value Ref Range    Sodium 119 (LL) 136 - 145 mmol/L    Potassium 4.1 3.5 - 5.1 mmol/L    Chloride 89 (L) 97 - 108 mmol/L    CO2 19 (L) 21 - 32 mmol/L    Anion gap 11 5 - 15 mmol/L    Glucose 100 65 - 100 mg/dL    BUN 7 6 - 20 mg/dL    Creatinine 0.82 0.70 - 1.30 mg/dL    BUN/Creatinine ratio 9 (L) 12 - 20      GFR est AA >60 >60 ml/min/1.73m2    GFR est non-AA >60 >60 ml/min/1.73m2    Calcium 8.7 8.5 - 10.1 mg/dL    Bilirubin, total 0.9 0.2 - 1.0 mg/dL    AST (SGOT) 152 (H) 15 - 37 U/L    ALT (SGPT) 68 12 - 78 U/L    Alk.  phosphatase 45 45 - 117 U/L    Protein, total 7.8 6.4 - 8.2 g/dL    Albumin 4.6 3.5 - 5.0 g/dL    Globulin 3.2 2.0 - 4.0 g/dL    A-G Ratio 1.4 1.1 - 2.2     TROPONIN I    Collection Time: 06/18/21  1:15 PM   Result Value Ref Range    Troponin-I, Qt. <0.05 <0.05 ng/mL   BNP    Collection Time: 06/18/21  1:15 PM   Result Value Ref Range    NT pro-BNP 88 <125 pg/mL   LIPASE    Collection Time: 06/18/21  1:15 PM   Result Value Ref Range    Lipase 77 73 - 393 U/L   VENOUS BLOOD GAS    Collection Time: 06/18/21  1:15 PM   Result Value Ref Range    VENOUS PH 7.38 7.31 - 7.41      VENOUS PCO2 35.5 (L) 40 - 50 mmHg    VENOUS PO2 53 (H) 36 - 42 mmHg    VENOUS O2 SATURATION 89 (H) 60 - 80 %    VENOUS BICARBONATE 21 (L) 22 - 26 mmol/L    VENOUS BASE DEFICIT 3.6 (H) 0 - 2 mmol/L    O2 METHOD Room air      FIO2 21.0 %    SITE Right Brachial     ETHYL ALCOHOL    Collection Time: 06/18/21  1:15 PM   Result Value Ref Range    ALCOHOL(ETHYL),SERUM <4 <10 mg/dL       Radiologic Studies -   CT Results  (Last 48 hours)               06/18/21 1458  CTA ABDOMEN PELV W CONT Final result    Impression:  Normal exam. No evidence to explain unspecified pain       Narrative:  CT dose reduction was achieved through use of a standardized protocol tailored   for this examination and automatic exposure control for dose modulation. Contrast study shows normal liver, spleen, pancreas, gallbladder, adrenals and   left kidney. Small cyst in right kidney. Moderate aortic calcification without   aneurysm or dissection. No small bowel abnormality, lymphadenopathy or free   fluid       Borderline size prostate. Normal bladder. Moderate distal diverticulosis without   wall thickening or fat stranding. Normal appendix. No abdominal wall hernia. Moderate degenerative changes in the spine           06/18/21 1458  CTA CHEST W OR W WO CONT Final result    Impression:  Severe emphysema. No active findings       Narrative:  Contrast study was cc [id number] CT dose reduction was achieved through use of a   standardized protocol tailored for this examination and automatic exposure   control for dose modulation. Severe diffuse emphysema. Mild subpleural fibrosis, patchy in distribution. Lungs otherwise clear and central airways are open. Pulmonary arteries are fairly well-opacified and show no filling defect or   distortion. Aorta shows normal dimensions, without dissection.  No mediastinal or   hilar adenopathy. Mild coronary calcification. No cardiac finding. No pleural or   pericardial effusion. Multiple ununited rib fractures bilaterally               CXR Results  (Last 48 hours)               06/18/21 1404  XR CHEST PORT Final result    Impression:  The cardiomediastinal silhouette is appropriate for age, technique,   and lung expansion. Pulmonary vasculature is not congested. The lungs are   essentially clear. No effusion or pneumothorax is seen. Narrative:  1 view                   Medical Decision Making   - I am the first provider for this patient. - I reviewed the vital signs, available nursing notes, past medical history, past surgical history, family history and social history. - Initial assessment performed. The patients presenting problems have been discussed, and they are in agreement with the care plan formulated and outlined with them. I have encouraged them to ask questions as they arise throughout their visit. Vital Signs-Reviewed the patient's vital signs. Patient Vitals for the past 12 hrs:   Temp Pulse Resp BP SpO2   06/18/21 1400  87 20 (!) 143/67 98 %   06/18/21 1318     98 %   06/18/21 1300 98.9 °F (37.2 °C) 98 28 (!) 159/102 97 %       EKG interpretation: (Preliminary) 1306  Rhythm: normal sinus rhythm; and regular . Rate (approx.): 84; Axis: normal; P wave: normal; QRS interval: normal ; ST/T wave: nonspecific changes; QT prolonged 504      Records Reviewed: Nursing Notes and Old Medical Records    The patient presents with abd pain, shortness of breath with a differential diagnosis of COPD exacerbation, asthma exacerbation, bronchitis, PNA, ACS, acute abdomen, dissection      ED Course:     ED Course as of Jun 18 1552 Fri Jun 18, 2021   1401 Breathing improved after duonebs x 2. No longer in respiratory distress. Speaking in complete sentences. No hypoxia. No tachypnea. Mostly complaining of abdominal pain at this time.     [NO]   [phone] Patient laying flat and comfortable    [NO]   [id number] Patient very tachypneic and tachycardic with minimal movement     [NO]   1551 CONSULT NOTE:  Consultant: Dr. Mable Ricks  Specialty: Hospitalist  Discussed pt's history, disposition, and available diagnostic and imaging results. Reviewed care plans. Patient will be admitted to the hospital for further management. RAMONA Campo    [NO]      ED Course User Index  [NO] RAMONA Rodriguez       Provider Notes (Medical Decision Making):     MDM  Number of Diagnoses or Management Options  COPD exacerbation ([address]  Hyponatremia  Diagnosis management comments:     69-year-old male with shortness of breath and abdominal pain. Concerning for dissection or acute process given patient having pain out of proportion of unclear etiology. Chest x-ray unremarkable. CTA chest revealing emphysema. CT abdomen with no acute process. CBC within normal limits. CMP with hyponatremia 111, unclear etiology. Patient denies alcohol use for several years. Patient not hypoxic at rest.  However, with minimal movement he becomes very tachypneic and tachycardic. Will admit to hospital for COPD exacerbation and hyponatremia. Patient agreeable with plan of care       Amount and/or Complexity of Data Reviewed  Clinical lab tests: ordered and reviewed  Tests in the radiology section of CPT®: ordered and reviewed  Review and summarize past medical records: yes  Discuss the patient with other providers: yes  Independent visualization of images, tracings, or specimens: yes    Patient Progress  Patient progress: stable             Disposition   Disposition: Admit to hospital    Diagnosis     Clinical Impression:   1. COPD exacerbation ([address]    2. Hyponatremia        Attestations:    RAMONA Campo    Please note that this dictation was completed with [employer], the [employer] voice recognition software.   Quite often unanticipated grammatical, syntax, homophones, and other interpretive errors are inadvertently transcribed by the computer software. Please disregard these errors. Please excuse any errors that have escaped final proofreading. Thank you.

## 2021-06-18 NOTE — H&P
History and Physical    Patient: Elvi Galeana MRN: [id number]  SSN: [id number]    YOB: 1955  Age: 77 y.o. Sex: male      Subjective:      Elvi Galeana is a 77 y.o.  male with a past medical history significant for severe emphysema/COPD, asthma, everyday smoker, and alcohol abuse who presented to the ED with a primary complaint of progressively worsening shortness of breath. Patient states shortness of breath has been intermittent. He reports associated wheezing. He used home inhalers without relief. Does not have nebulizer machine at home. Patient currently smokes 1 pack of cigarettes a day. He used to be a 2-3 pack smoker in the past.  He does not have a PCP or pulmonary provider. He denies any fever, chills, chest pain, cough, abdominal pain, nausea, vomiting, or extremity edema. In the ED, he was noted to be tachypneic and wheezing. Given nebulizer treatment with improvement. Significant labs showing sodium 119. EKG with normal sinus rhythm. CTA of chest showing severe emphysema. Patient started on scheduled duonebs and systemic steroids. Admitted to hospitalist group for COPD exacerbation. Will give bolus of IVFs and start on continuous fluid replacement. Pulmonary consult. Past Medical History:   Diagnosis Date    Asthma     COPD (chronic obstructive pulmonary disease) ([facility] [address]      No past surgical history on file. No family history on file. Social History     Tobacco Use    Smoking status: Current Every Day Smoker     Packs/day: 0.50    Smokeless tobacco: Never Used   Substance Use Topics    Alcohol use: Yes     Comment: 2 bottles of wine x 40 years, 24 beers per day      Prior to Admission medications    Medication Sig Start Date End Date Taking? Authorizing Provider   acetaminophen (TYLENOL) 325 mg tablet Take 2 Tabs by mouth every four (4) hours as needed.  12/1/18   Lewis Bermeo MD   albuterol-ipratropium (DUO-NEB) 2.5 mg-0.5 mg/3 ml nebu 3 mL by Nebulization route every four (4) hours as needed. COPD J44.9 12/1/18   Yo Augustine MD   chlorpheniramine-HYDROcodone (TUSSIONEX) 10-8 mg/5 mL suspension Take 5 mL by mouth every twelve (12) hours as needed for Cough. Max Daily Amount: 10 mL. 12/1/18   Yo Augustine MD   famotidine (PEPCID) 20 mg tablet Take 1 Tab by mouth two (2) times a day. 12/1/18   Jose Weaver MD   folic acid (FOLVITE) 1 mg tablet Take 1 Tab by mouth daily. 12/2/18   oY Augustine MD   guaiFENesin ER (MUCINEX) 600 mg ER tablet Take 1 Tab by mouth every twelve (12) hours. 12/1/18   Yo Augustine MD   melatonin 3 mg tablet Take 1 Tab by mouth nightly. 12/1/18   Jose Weaver MD   predniSONE (DELTASONE) 10 mg tablet Take 10 mg by mouth Multiple. Take 2 tab po daily for 3 days then  Take 1 tab po daily for 3 days then  Take 1/2 tab po daily for 3 days then stop 12/1/18   Yo Augustine MD   Thiamine Mononitrate (B-1) 100 mg tablet Take 1 Tab by mouth daily. 12/2/18   Yo Augustine MD   albuterol (PROVENTIL HFA, VENTOLIN HFA, PROAIR HFA) 90 mcg/actuation inhaler Take 2 Puffs by inhalation every four (4) hours as needed for Wheezing.  COPD J44.9 12/1/18   Yo Augustine MD        No Known Allergies    Review of Systems:  Constitutional: No fevers, No chills, No fatigue, No weakness  Eyes: No visual disturbance  ENT: No nasal congestion, No sore throat  Respiratory: + SOB, + wheezing, No cough, No sputum  Cardiovascular: No chest pain, No lower extremity edema, No Palpitations   Gastrointestinal: No nausea, No vomiting, No diarrhea, No constipation, No abdominal pain  Genitourinary: No frequency, No dysuria  Integument/Breast: No rash, No skin lesions  Musculoskeletal: No arthralgias, No neck pain, No back pain  Neurological: No headaches, No dizziness, No confusion,  No seizures  Behavioral/Psychiatric: No anxiety, No depression      Objective:     Vitals:    06/18/21 1300 06/18/21 1318 06/18/21 1400   BP: (!) 159/102  (!) 143/67   Pulse: 98  87   Resp: 28  20   Temp: 98.9 °F (37.2 °C)     SpO2: 97% 98% 98%   Weight: 68 kg (150 lb)     Height: 5' 7\" (1.702 m)          Physical Exam:  General: Alert, cooperative, no distress  Eye: conjunctivae/corneas clear. PERRL, EOM's intact. Throat and Neck: Supple neck. No pharyngeal erythema or exudates noted. No mass   Lung: Symmetric chest wall expansion. Significantly decreased air entry at bases. No wheezing or rhonchi appreciated. On room air. Heart: regular rate and rhythm, normal S1/S2. No murmur. No JVD. Abdomen: soft, non-tender. Bowel sounds normal. No masses,  Extremities:  able to move all extremities normal, atraumatic. Distal pulses 2+ bilaterally. Skin: Chronic venous changes to lower ext. Neurologic: AOx3. Cranial nerves 2-12 and sensation grossly intact.   Psychiatric: non focal    Recent Results (from the past 24 hour(s))   EKG, 12 LEAD, INITIAL    Collection Time: 06/18/21  1:06 PM   Result Value Ref Range    Ventricular Rate 84 BPM    Atrial Rate 84 BPM    P-R Interval 170 ms    QRS Duration 90 ms    Q-T Interval 504 ms    QTC Calculation (Bezet) 595 ms    Calculated P Axis 79 degrees    Calculated R Axis 44 degrees    Calculated T Axis 66 degrees    Diagnosis       Normal sinus rhythm  Nonspecific ST abnormality  Prolonged QT  Abnormal ECG  No previous ECGs available  Confirmed by Aaliyah Fowler ([id number]) on 6/18/2021 1:32:47 PM     CBC WITH AUTOMATED DIFF    Collection Time: 06/18/21  1:15 PM   Result Value Ref Range    WBC 9.9 4.1 - 11.1 K/uL    RBC 4.64 4.10 - 5.70 M/uL    HGB 15.0 12.1 - 17.0 g/dL    HCT 40.1 36.6 - 50.3 %    MCV 86.4 80.0 - 99.0 FL    MCH 32.3 26.0 - 34.0 PG    MCHC 37.4 (H) 30.0 - 36.5 g/dL    RDW 11.5 11.5 - 14.5 %    PLATELET 769 454 - 039 K/uL    MPV 9.1 8.9 - 12.9 FL    NRBC 0.0 0.0  WBC    ABSOLUTE NRBC 0.00 0.00 - 0.01 K/uL    NEUTROPHILS 78 (H) 32 - 75 %    LYMPHOCYTES 13 12 - 49 %    MONOCYTES 8 5 - 13 %    EOSINOPHILS 0 0 - 7 %    BASOPHILS 0 0 - 1 %    IMMATURE GRANULOCYTES 1 (H) 0 - 0.5 %    ABS. NEUTROPHILS 7.8 1.8 - 8.0 K/UL    ABS. LYMPHOCYTES 1.3 0.8 - 3.5 K/UL    ABS. MONOCYTES 0.8 0.0 - 1.0 K/UL    ABS. EOSINOPHILS 0.0 0.0 - 0.4 K/UL    ABS. BASOPHILS 0.0 0.0 - 0.1 K/UL    ABS. IMM. GRANS. 0.1 (H) 0.00 - 0.04 K/UL    DF AUTOMATED     METABOLIC PANEL, COMPREHENSIVE    Collection Time: 06/18/21  1:15 PM   Result Value Ref Range    Sodium 119 (LL) 136 - 145 mmol/L    Potassium 4.1 3.5 - 5.1 mmol/L    Chloride 89 (L) 97 - 108 mmol/L    CO2 19 (L) 21 - 32 mmol/L    Anion gap 11 5 - 15 mmol/L    Glucose 100 65 - 100 mg/dL    BUN 7 6 - 20 mg/dL    Creatinine 0.82 0.70 - 1.30 mg/dL    BUN/Creatinine ratio 9 (L) 12 - 20      GFR est AA >60 >60 ml/min/1.73m2    GFR est non-AA >60 >60 ml/min/1.73m2    Calcium 8.7 8.5 - 10.1 mg/dL    Bilirubin, total 0.9 0.2 - 1.0 mg/dL    AST (SGOT) 152 (H) 15 - 37 U/L    ALT (SGPT) 68 12 - 78 U/L    Alk.  phosphatase 45 45 - 117 U/L    Protein, total 7.8 6.4 - 8.2 g/dL    Albumin 4.6 3.5 - 5.0 g/dL    Globulin 3.2 2.0 - 4.0 g/dL    A-G Ratio 1.4 1.1 - 2.2     TROPONIN I    Collection Time: 06/18/21  1:15 PM   Result Value Ref Range    Troponin-I, Qt. <0.05 <0.05 ng/mL   BNP    Collection Time: 06/18/21  1:15 PM   Result Value Ref Range    NT pro-BNP 88 <125 pg/mL   LIPASE    Collection Time: 06/18/21  1:15 PM   Result Value Ref Range    Lipase 77 73 - 393 U/L   VENOUS BLOOD GAS    Collection Time: 06/18/21  1:15 PM   Result Value Ref Range    VENOUS PH 7.38 7.31 - 7.41      VENOUS PCO2 35.5 (L) 40 - 50 mmHg    VENOUS PO2 53 (H) 36 - 42 mmHg    VENOUS O2 SATURATION 89 (H) 60 - 80 %    VENOUS BICARBONATE 21 (L) 22 - 26 mmol/L    VENOUS BASE DEFICIT 3.6 (H) 0 - 2 mmol/L    O2 METHOD Room air      FIO2 21.0 %    SITE Right Brachial     ETHYL ALCOHOL    Collection Time: 06/18/21  1:15 PM   Result Value Ref Range    ALCOHOL(ETHYL),SERUM <4 <10 mg/dL       XR Results (maximum last 3):  Results from Hospital Encounter encounter on 06/18/21    XR CHEST PORT    Narrative  1 view    Impression  The cardiomediastinal silhouette is appropriate for age, technique,  and lung expansion. Pulmonary vasculature is not congested. The lungs are  essentially clear. No effusion or pneumothorax is seen. Results from [place] encounter on 11/26/18    XR CHEST PORT    Narrative  EXAM:  XR CHEST PORT    INDICATION:   SOB    COMPARISON: Chest radiograph 8/24/2018. FINDINGS: AP radiograph of the chest was obtained. Hyperexpanded lungs are again noted. No evidence of focal consolidation. No  pleural effusion or pneumothorax. Heart, burton, mediastinum are within normal  limits. No acute osseous abnormalities. Subacute to chronic bilateral rib  fractures. Impression  IMPRESSION:  1. No acute cardiopulmonary process. 2. Chronic emphysematous changes. 3. Subacute to chronic bilateral rib fractures. Results from Hospital Encounter encounter on 08/24/18    XR CHEST PORT    Narrative  EXAM:  XR CHEST PORT    INDICATION:  Left tension pneumothorax. Chest tube placement. Respiratory  distress, intubation. COMPARISON: CT chest earlier today. TECHNIQUE:  Supine portable chest AP view    FINDINGS: Left chest tube appears to be in good position. Left pneumothorax is  decreased and small. There is no longer shift of the mediastinum. Endotracheal  tube is in good position. The pulmonary vasculature is within normal limits. Pneumomediastinum is unchanged. Chest wall gas is unchanged. Multiple rib fractures are unchanged. No pneumonia. Impression  IMPRESSION:    Left chest tube appears to be in good position. Resolution of tension  pneumothorax. Small residual left apical pneumothorax. Endotracheal tube is in  good position. Unchanged extensive soft tissue gas and pneumomediastinum. CT Results (maximum last 3):   Results from [place] encounter on 06/18/21    CTA CHEST W OR W WO CONT    Narrative  Contrast study was cc Isovue-370 CT dose reduction was achieved through use of a  standardized protocol tailored for this examination and automatic exposure  control for dose modulation. Severe diffuse emphysema. Mild subpleural fibrosis, patchy in distribution. Lungs otherwise clear and central airways are open. Pulmonary arteries are fairly well-opacified and show no filling defect or  distortion. Aorta shows normal dimensions, without dissection. No mediastinal or  hilar adenopathy. Mild coronary calcification. No cardiac finding. No pleural or  pericardial effusion. Multiple ununited rib fractures bilaterally    Impression  Severe emphysema. No active findings      CTA ABDOMEN PELV W CONT    Narrative  CT dose reduction was achieved through use of a standardized protocol tailored  for this examination and automatic exposure control for dose modulation. Contrast study shows normal liver, spleen, pancreas, gallbladder, adrenals and  left kidney. Small cyst in right kidney. Moderate aortic calcification without  aneurysm or dissection. No small bowel abnormality, lymphadenopathy or free  fluid    Borderline size prostate. Normal bladder. Moderate distal diverticulosis without  wall thickening or fat stranding. Normal appendix. No abdominal wall hernia. Moderate degenerative changes in the spine    Impression  Normal exam. No evidence to explain unspecified pain      Results from Hospital Encounter encounter on 08/24/18    CT CHEST W CONT    Narrative  INDICATION: Respiratory distress. Intubation. Possible EtOH intoxication. COMPARISON: Portable chest earlier today. TECHNIQUE:  Following the uneventful intravenous administration of 90 cc  Isovue-300, 5 mm axial images were obtained through the chest. Coronal and  sagittal reconstructions were generated.   CT dose reduction was achieved through  use of a standardized protocol tailored for this examination and automatic  exposure control for dose modulation. FINDINGS:  Extensive chest wall gas. THYROID: No nodule. MEDIASTINUM: Mediastinal gas is extensive. No lymphadenopathy. Right shift of  the mediastinum. ANA: No mass or lymphadenopathy. THORACIC AORTA: No dissection or aneurysm. MAIN PULMONARY ARTERY: Normal caliber. No central pulmonary embolism. TRACHEA/BRONCHI: Endotracheal tube extends into the distal trachea. ESOPHAGUS: No wall thickening or dilatation. HEART: Normal cardiac size. Coronary artery calcific atherosclerosis is visible. PLEURA: Large left pneumothorax. Trace left pleural effusion. LUNGS: Moderate centrilobular emphysema for age. Partial atelectasis of all  lobes. INCIDENTALLY IMAGED UPPER ABDOMEN: No focal abnormality. BONES: Left fifth, sixth, seventh, eighth rib fractures. Right ninth rib  fracture. Multiple old right rib fractures. Impression  IMPRESSION:    1. Bilateral rib fractures. 2. Large left pneumothorax  3. Right mediastinal shift indicates tension pneumothorax. 4. Chest wall gas and mediastinal gas. I discussed this impression with Dr. Macie Camp at 2211 hours on 8/24/2018.    1      MRI Results (maximum last 3): No results found for this or any previous visit. Nuclear Medicine Results (maximum last 3): No results found for this or any previous visit. US Results (maximum last 3): No results found for this or any previous visit. Active Problems:    COPD exacerbation ([place] [address] (6/18/2021)        Assessment/Plan:     1. COPD exacerbation  - CTA of chest showing severe emphysema  - Start on scheduled duonebs, albuterol HFA prn, and systemic steroids  - Continue supplemental oxygen to maintain saturations >92%  - Pulmonary consult  - Will need PFTs    2. Hyponatremia  - Will give 1 L bolus  - Start of IVFs at 75 mL/hr    3. Hx asthma  - Continue treatment as mentioned above  - Will add montelukast    4.  Current smoker  - Smoking cessation counseling provider  - Nicoderm patch    5.  Hx alcohol abuse  - Ethyl alcohol level normal    DVT Prophylaxis: Lovenox subcu   Code Status: Full  POA    Total Time >55 minutes      Signed By: Paris Schwartz PA-C     June 18, 2021

## 2021-06-19 LAB
ANION GAP SERPL CALC-SCNC: 12 MMOL/L (ref 5–15)
BUN SERPL-MCNC: 8 MG/DL (ref 6–20)
BUN/CREAT SERPL: 9 (ref 12–20)
CA-I BLD-MCNC: 9.2 MG/DL (ref 8.5–10.1)
CHLORIDE SERPL-SCNC: 95 MMOL/L (ref 97–108)
CHOLEST SERPL-MCNC: 144 MG/DL
CO2 SERPL-SCNC: 21 MMOL/L (ref 21–32)
CREAT SERPL-MCNC: 0.87 MG/DL (ref 0.7–1.3)
ERYTHROCYTE [DISTWIDTH] IN BLOOD BY AUTOMATED COUNT: 12 % (ref 11.5–14.5)
EST. AVERAGE GLUCOSE BLD GHB EST-MCNC: 103 MG/DL
GLUCOSE SERPL-MCNC: 102 MG/DL (ref 65–100)
HBA1C MFR BLD: 5.2 % (ref 4–5.6)
HCT VFR BLD AUTO: 42.1 % (ref 36.6–50.3)
HDLC SERPL-MCNC: 66 MG/DL
HDLC SERPL: 2.2 {RATIO} (ref 0–5)
HGB BLD-MCNC: 15.1 G/DL (ref 12.1–17)
LDLC SERPL CALC-MCNC: 69.2 MG/DL (ref 0–100)
LIPID PROFILE,FLP: NORMAL
MCH RBC QN AUTO: 31.5 PG (ref 26–34)
MCHC RBC AUTO-ENTMCNC: 35.9 G/DL (ref 30–36.5)
MCV RBC AUTO: 87.9 FL (ref 80–99)
NRBC # BLD: 0 K/UL (ref 0–0.01)
NRBC BLD-RTO: 0 PER 100 WBC
PLATELET # BLD AUTO: 240 K/UL (ref 150–400)
PMV BLD AUTO: 9.6 FL (ref 8.9–12.9)
POTASSIUM SERPL-SCNC: 4.5 MMOL/L (ref 3.5–5.1)
RBC # BLD AUTO: 4.79 M/UL (ref 4.1–5.7)
SODIUM SERPL-SCNC: 128 MMOL/L (ref 136–145)
TRIGL SERPL-MCNC: 44 MG/DL (ref ?–150)
VLDLC SERPL CALC-MCNC: 8.8 MG/DL
WBC # BLD AUTO: 5.9 K/UL (ref 4.1–11.1)

## 2021-06-19 PROCEDURE — 96372 THER/PROPH/DIAG INJ SC/IM: CPT

## 2021-06-19 PROCEDURE — 80048 BASIC METABOLIC PNL TOTAL CA: CPT

## 2021-06-19 PROCEDURE — 99218 HC RM OBSERVATION: CPT

## 2021-06-19 PROCEDURE — 74011250637 HC RX REV CODE- 250/637: Performed by: INTERNAL MEDICINE

## 2021-06-19 PROCEDURE — 96376 TX/PRO/DX INJ SAME DRUG ADON: CPT

## 2021-06-19 PROCEDURE — 85027 COMPLETE CBC AUTOMATED: CPT

## 2021-06-19 PROCEDURE — 74011250636 HC RX REV CODE- 250/636: Performed by: STUDENT IN AN ORGANIZED HEALTH CARE EDUCATION/TRAINING PROGRAM

## 2021-06-19 PROCEDURE — 74011000250 HC RX REV CODE- 250: Performed by: NURSE PRACTITIONER

## 2021-06-19 PROCEDURE — 94640 AIRWAY INHALATION TREATMENT: CPT

## 2021-06-19 PROCEDURE — 36415 COLL VENOUS BLD VENIPUNCTURE: CPT

## 2021-06-19 PROCEDURE — 77010033678 HC OXYGEN DAILY

## 2021-06-19 PROCEDURE — 74011000250 HC RX REV CODE- 250: Performed by: STUDENT IN AN ORGANIZED HEALTH CARE EDUCATION/TRAINING PROGRAM

## 2021-06-19 PROCEDURE — 94760 N-INVAS EAR/PLS OXIMETRY 1: CPT

## 2021-06-19 PROCEDURE — 74011250636 HC RX REV CODE- 250/636: Performed by: INTERNAL MEDICINE

## 2021-06-19 PROCEDURE — 74011250637 HC RX REV CODE- 250/637: Performed by: STUDENT IN AN ORGANIZED HEALTH CARE EDUCATION/TRAINING PROGRAM

## 2021-06-19 PROCEDURE — 74011636637 HC RX REV CODE- 636/637: Performed by: STUDENT IN AN ORGANIZED HEALTH CARE EDUCATION/TRAINING PROGRAM

## 2021-06-19 RX ORDER — DOXYCYCLINE 100 MG/1
100 CAPSULE ORAL EVERY 12 HOURS
Status: DISCONTINUED | OUTPATIENT
Start: 2021-06-19 | End: 2021-06-21 | Stop reason: HOSPADM

## 2021-06-19 RX ORDER — ASPIRIN 325 MG/1
100 TABLET, FILM COATED ORAL DAILY
Status: DISCONTINUED | OUTPATIENT
Start: 2021-06-19 | End: 2021-06-21 | Stop reason: HOSPADM

## 2021-06-19 RX ORDER — FAMOTIDINE 20 MG/1
20 TABLET, FILM COATED ORAL 2 TIMES DAILY
Status: DISCONTINUED | OUTPATIENT
Start: 2021-06-19 | End: 2021-06-21 | Stop reason: HOSPADM

## 2021-06-19 RX ORDER — IPRATROPIUM BROMIDE AND ALBUTEROL SULFATE 2.5; .5 MG/3ML; MG/3ML
3 SOLUTION RESPIRATORY (INHALATION)
Status: DISCONTINUED | OUTPATIENT
Start: 2021-06-19 | End: 2021-06-20

## 2021-06-19 RX ORDER — ALBUTEROL SULFATE 90 UG/1
2 AEROSOL, METERED RESPIRATORY (INHALATION)
Status: DISCONTINUED | OUTPATIENT
Start: 2021-06-19 | End: 2021-06-21 | Stop reason: HOSPADM

## 2021-06-19 RX ORDER — GUAIFENESIN 600 MG/1
600 TABLET, EXTENDED RELEASE ORAL EVERY 12 HOURS
Status: DISCONTINUED | OUTPATIENT
Start: 2021-06-19 | End: 2021-06-21 | Stop reason: HOSPADM

## 2021-06-19 RX ORDER — IPRATROPIUM BROMIDE AND ALBUTEROL SULFATE 2.5; .5 MG/3ML; MG/3ML
3 SOLUTION RESPIRATORY (INHALATION)
Status: DISCONTINUED | OUTPATIENT
Start: 2021-06-19 | End: 2021-06-19

## 2021-06-19 RX ORDER — LANOLIN ALCOHOL/MO/W.PET/CERES
3 CREAM (GRAM) TOPICAL
Status: DISCONTINUED | OUTPATIENT
Start: 2021-06-19 | End: 2021-06-21 | Stop reason: HOSPADM

## 2021-06-19 RX ORDER — FOLIC ACID 1 MG/1
1 TABLET ORAL DAILY
Status: DISCONTINUED | OUTPATIENT
Start: 2021-06-19 | End: 2021-06-21 | Stop reason: HOSPADM

## 2021-06-19 RX ADMIN — ENOXAPARIN SODIUM 40 MG: 40 INJECTION SUBCUTANEOUS at 09:42

## 2021-06-19 RX ADMIN — DOXYCYCLINE HYCLATE 100 MG: 100 CAPSULE ORAL at 20:01

## 2021-06-19 RX ADMIN — FAMOTIDINE 20 MG: 20 TABLET ORAL at 20:01

## 2021-06-19 RX ADMIN — FAMOTIDINE 20 MG: 20 TABLET ORAL at 12:39

## 2021-06-19 RX ADMIN — Medication 10 ML: at 14:21

## 2021-06-19 RX ADMIN — FOLIC ACID 1 MG: 1 TABLET ORAL at 12:38

## 2021-06-19 RX ADMIN — ALBUTEROL SULFATE 2 PUFF: 108 AEROSOL, METERED RESPIRATORY (INHALATION) at 10:42

## 2021-06-19 RX ADMIN — IPRATROPIUM BROMIDE AND ALBUTEROL SULFATE 3 ML: .5; 3 SOLUTION RESPIRATORY (INHALATION) at 23:48

## 2021-06-19 RX ADMIN — SODIUM CHLORIDE 75 ML/HR: 9 INJECTION, SOLUTION INTRAVENOUS at 18:58

## 2021-06-19 RX ADMIN — GUAIFENESIN 600 MG: 600 TABLET, EXTENDED RELEASE ORAL at 12:39

## 2021-06-19 RX ADMIN — IPRATROPIUM BROMIDE AND ALBUTEROL SULFATE 3 ML: .5; 3 SOLUTION RESPIRATORY (INHALATION) at 13:24

## 2021-06-19 RX ADMIN — METHYLPREDNISOLONE SODIUM SUCCINATE 40 MG: 40 INJECTION, POWDER, FOR SOLUTION INTRAMUSCULAR; INTRAVENOUS at 17:28

## 2021-06-19 RX ADMIN — GUAIFENESIN 600 MG: 600 TABLET, EXTENDED RELEASE ORAL at 20:01

## 2021-06-19 RX ADMIN — THIAMINE HCL TAB 100 MG 100 MG: 100 TAB at 14:02

## 2021-06-19 RX ADMIN — PREDNISONE 20 MG: 20 TABLET ORAL at 09:59

## 2021-06-19 RX ADMIN — METHYLPREDNISOLONE SODIUM SUCCINATE 40 MG: 40 INJECTION, POWDER, FOR SOLUTION INTRAMUSCULAR; INTRAVENOUS at 20:01

## 2021-06-19 RX ADMIN — IPRATROPIUM BROMIDE AND ALBUTEROL SULFATE 3 ML: .5; 3 SOLUTION RESPIRATORY (INHALATION) at 16:55

## 2021-06-19 RX ADMIN — IPRATROPIUM BROMIDE AND ALBUTEROL SULFATE 3 ML: .5; 3 SOLUTION RESPIRATORY (INHALATION) at 20:35

## 2021-06-19 RX ADMIN — MONTELUKAST 10 MG: 10 TABLET, FILM COATED ORAL at 20:01

## 2021-06-19 NOTE — CONSULTS
Pulmonology and Critical Care Consult    Subjective:   Consult Note: 6/19/2021 3:49 PM    Chief Complaint:   Chief Complaint   Patient presents with    Abdominal Pain    Shortness of Breath        This patient has been seen and evaluated at the request of Nasrin Gilliam NP. Patient is a 41-year-old  male with  history of alcohol abuse, nicotine dependence, emphysema, and likely COPD who presented to the hospital with worsening shortness of breath over the past several days and was found to be hypoxic. Patient is a relatively poor historian but states that he is smoked 1 to 2 packs of cigarettes for the past 30+ years. He denies any known history of COPD but states that he does know about a history of emphysema in the past.  He states that he feels much better today than yesterday and he is not really sure what to attribute his improvement in his symptoms. His CBC is normal today, BMP shows normal renal function with an improvement in his sodium to 128 from 119 on admission, troponin negative x1, proBNP normal at 88, TSH normal at 1.2. A CTA of the chest on admission showed no evidence of pulmonary embolism but did show severe bilateral emphysema. He is currently on normal saline at 75 cc an hour in order to treat his hyponatremia. He is on every 4 hours duo nebs, prednisone 20 mg daily, Singulair which is a home medication, and a NicoDerm patch. He does not have any desire to smoke cigarettes at this time but he has a low suspicion that he will be able to quit smoking after discharge. He does not follow with a pulmonologist and is only on an as needed albuterol inhaler at home, denies having a nebulizer at home. He still has significant expiratory wheezes on exam, I will increase his steroids to Solu-Medrol 40 mg IV every 8 hours over the next 24 to 48 hours. Suspect he will be able to be discharged once he is off supplemental oxygen with exertion.   Today he had a walking O2 test and on room air at rest he was saturating 94%, on room air with ambulation he was saturating 84%, and on ambulation on 3 L nasal cannula he was saturating 93%. A VBG on admission was 7.38/35/53. I will go ahead and start the patient on doxycycline 100 mg p.o. every 12 hours x5 doses for an exacerbation of COPD. His QTC was greater than 500, therefore I will avoid Levaquin/azithromycin. Past Medical History:   Diagnosis Date    Asthma     COPD (chronic obstructive pulmonary disease) ([facility] [address]      No past surgical history on file. No family history on file.   Social History     Tobacco Use    Smoking status: Current Every Day Smoker     Packs/day: 0.50    Smokeless tobacco: Never Used   Substance Use Topics    Alcohol use: Yes     Comment: 2 bottles of wine x 40 years, 24 beers per day      Current Facility-Administered Medications   Medication Dose Route Frequency Provider Last Rate Last Admin    albuterol (PROVENTIL HFA, VENTOLIN HFA, PROAIR HFA) inhaler 2 Puff  2 Puff Inhalation Q4H PRN Leonardo Richard PA-C   2 Puff at 06/19/21 1042    famotidine (PEPCID) tablet 20 mg  20 mg Oral BID Leonardo Richard PA-C   20 mg at 40/44/31 2168    folic acid (FOLVITE) tablet 1 mg  1 mg Oral DAILY Leonardo Richard PA-C   1 mg at 06/19/21 1238    guaiFENesin ER (MUCINEX) tablet 600 mg  600 mg Oral Q12H GAMALIEL MuñizC   600 mg at 06/19/21 1239    melatonin tablet 3 mg  3 mg Oral QHS Leonardo Richard PA-C        thiamine mononitrate (B-1) tablet 100 mg  100 mg Oral DAILY RAMONA Muñiz-C   100 mg at 06/19/21 1402    albuterol-ipratropium (DUO-NEB) 2.5 MG-0.5 MG/3 ML  3 mL Nebulization Q4H RT Eloise Parkinson NP   3 mL at 06/19/21 1324    methylPREDNISolone (PF) (SOLU-MEDROL) injection 40 mg  40 mg IntraVENous Q8H Jluis Jackson DO        0.9% sodium chloride infusion  75 mL/hr IntraVENous CONTINUOUS Leonardo Richard PA-C 75 mL/hr at 06/18/21 1901 75 mL/hr at 06/18/21 1901    sodium chloride (NS) flush 5-40 mL  5-40 mL IntraVENous Q8H Jeff Underwood, PA-C   10 mL at 06/19/21 1421    sodium chloride (NS) flush 5-40 mL  5-40 mL IntraVENous PRN Jeff Underwood, PA-C        acetaminophen (TYLENOL) tablet 650 mg  650 mg Oral Q6H PRN Jeff Underwood, PA-C        Or    acetaminophen (TYLENOL) suppository 650 mg  650 mg Rectal Q6H PRN Jeff Underwood, PA-C        polyethylene glycol (MIRALAX) packet 17 g  17 g Oral DAILY PRN Jeff Underwood, PA-C        ondansetron (ZOFRAN ODT) tablet 4 mg  4 mg Oral Q8H PRN Jeff Underwood, PA-C        Or    ondansetron [facility]) injection 4 mg  4 mg IntraVENous Q6H PRN Jeff Underwood, PA-C        enoxaparin (LOVENOX) injection 40 mg  40 mg SubCUTAneous DAILY Jeff Yasmine, PA-C   40 mg at 06/19/21 1966    hydrALAZINE (APRESOLINE) 20 mg/mL injection 20 mg  20 mg IntraVENous Q6H PRN Jeff Underwood, PA-C        montelukast (SINGULAIR) tablet 10 mg  10 mg Oral QHS Jeff Yasmine, PA-C   10 mg at 06/18/21 2348    nicotine (NICODERM CQ) 14 mg/24 hr patch 1 Patch  1 Patch TransDERmal DAILY Jeff Underwood PAGrisC   1 Patch at 06/19/21 0933        Home Meds:  No current facility-administered medications on file prior to encounter. Current Outpatient Medications on File Prior to Encounter   Medication Sig Dispense Refill    acetaminophen (TYLENOL) 325 mg tablet Take 2 Tabs by mouth every four (4) hours as needed. 40 Tab 0    albuterol-ipratropium (DUO-NEB) 2.5 mg-0.5 mg/3 ml nebu 3 mL by Nebulization route every four (4) hours as needed. COPD J44.9 100 Nebule 1    chlorpheniramine-HYDROcodone (TUSSIONEX) 10-8 mg/5 mL suspension Take 5 mL by mouth every twelve (12) hours as needed for Cough. Max Daily Amount: 10 mL. 100 mL 0    famotidine (PEPCID) 20 mg tablet Take 1 Tab by mouth two (2) times a day. 60 Tab 1    folic acid (FOLVITE) 1 mg tablet Take 1 Tab by mouth daily.  30 Tab 1    guaiFENesin ER (MUCINEX) 600 mg ER tablet Take 1 Tab by mouth every twelve (12) hours. 20 Tab 0    melatonin 3 mg tablet Take 1 Tab by mouth nightly. 30 Tab 0    predniSONE (DELTASONE) 10 mg tablet Take 10 mg by mouth Multiple. Take 2 tab po daily for 3 days then  Take 1 tab po daily for 3 days then  Take 1/2 tab po daily for 3 days then stop 12 Tab 0    Thiamine Mononitrate (B-1) 100 mg tablet Take 1 Tab by mouth daily. 30 Tab 1    albuterol (PROVENTIL HFA, VENTOLIN HFA, PROAIR HFA) 90 mcg/actuation inhaler Take 2 Puffs by inhalation every four (4) hours as needed for Wheezing. COPD J44.9 1 Inhaler 1         No Known Allergies    Review of Systems:  A comprehensive review of systems was negative except for that written in the HPI. Objective:     Blood pressure 139/83, pulse 95, temperature 97.4 °F (36.3 °C), resp. rate 20, height 5' 7\" (1.702 m), weight 68 kg (150 lb), SpO2 94 %. Temp (24hrs), Av.2 °F (36.8 °C), Min:97.4 °F (36.3 °C), Max:98.6 °F (37 °C)      Intake and Output:  Current Shift: No intake/output data recorded. Last 3 Shifts: 1 -  0700  In: 1000 [I.V.:1000]  Out: -     Physical Exam:   General appearance: alert, cooperative, no distress, appears stated age  Head: Normocephalic, without obvious abnormality, atraumatic  Eyes: negative  Neck: supple, symmetrical, trachea midline, no adenopathy, no carotid bruit and no JVD  Lungs: Significant expiratory wheezing bilaterally, no rales/rhonchi, no accessory muscle use, currently on 2 L nasal cannula  Heart: regular rate and rhythm, S1, S2 normal, no murmur, click, rub or gallop  Abdomen: soft, non-tender.  Bowel sounds normal. No masses,  no organomegaly  Extremities: extremities normal, atraumatic, no cyanosis or edema  Pulses: 2+ and symmetric  Skin: Skin color, texture, turgor normal. No rashes or lesions  Lymph nodes: Cervical, supraclavicular, and axillary nodes normal.  Neurologic: Grossly normal, alert and oriented x3    Additional comments:None    Lab/Data Review: All lab results for the last 24 hours reviewed. Recent Results (from the past 24 hour(s))   TSH 3RD GENERATION    Collection Time: 06/18/21  5:15 PM   Result Value Ref Range    TSH 1.20 0.36 - 3.74 uIU/mL   HEMOGLOBIN A1C WITH EAG    Collection Time: 06/18/21  5:15 PM   Result Value Ref Range    Hemoglobin A1c 5.2 4.0 - 5.6 %    Est. average glucose 103 mg/dL   LIPID PANEL    Collection Time: 06/18/21  5:15 PM   Result Value Ref Range    LIPID PROFILE        Cholesterol, total 144 <200 mg/dL    Triglyceride 44 <150 mg/dL    HDL Cholesterol 66 mg/dL    LDL, calculated 69.2 0 - 100 mg/dL    VLDL, calculated 8.8 mg/dL    CHOL/HDL Ratio 2.2 0.0 - 5.0     METABOLIC PANEL, BASIC    Collection Time: 06/19/21  2:29 AM   Result Value Ref Range    Sodium 128 (L) 136 - 145 mmol/L    Potassium 4.5 3.5 - 5.1 mmol/L    Chloride 95 (L) 97 - 108 mmol/L    CO2 21 21 - 32 mmol/L    Anion gap 12 5 - 15 mmol/L    Glucose 102 (H) 65 - 100 mg/dL    BUN 8 6 - 20 mg/dL    Creatinine 0.87 0.70 - 1.30 mg/dL    BUN/Creatinine ratio 9 (L) 12 - 20      GFR est AA >60 >60 ml/min/1.73m2    GFR est non-AA >60 >60 ml/min/1.73m2    Calcium 9.2 8.5 - 10.1 mg/dL   CBC W/O DIFF    Collection Time: 06/19/21  2:29 AM   Result Value Ref Range    WBC 5.9 4.1 - 11.1 K/uL    RBC 4.79 4.10 - 5.70 M/uL    HGB 15.1 12.1 - 17.0 g/dL    HCT 42.1 36.6 - 50.3 %    MCV 87.9 80.0 - 99.0 FL    MCH 31.5 26.0 - 34.0 PG    MCHC 35.9 30.0 - 36.5 g/dL    RDW 12.0 11.5 - 14.5 %    PLATELET 094 783 - 929 K/uL    MPV 9.6 8.9 - 12.9 FL    NRBC 0.0 0.0  WBC    ABSOLUTE NRBC 0.00 0.00 - 0.01 K/uL         Chest X-Ray:   CTA ABDOMEN PELV W CONT   Final Result   Normal exam. No evidence to explain unspecified pain      CTA CHEST W OR W WO CONT   Final Result   Severe emphysema. No active findings      XR CHEST PORT   Final Result   The cardiomediastinal silhouette is appropriate for age, technique,   and lung expansion. Pulmonary vasculature is not congested.  The lungs are   essentially clear. No effusion or pneumothorax is seen. CT imaging:  CT Results  (Last 48 hours)               06/18/21 1458  CTA ABDOMEN PELV W CONT Final result    Impression:  Normal exam. No evidence to explain unspecified pain       Narrative:  CT dose reduction was achieved through use of a standardized protocol tailored   for this examination and automatic exposure control for dose modulation. Contrast study shows normal liver, spleen, pancreas, gallbladder, adrenals and   left kidney. Small cyst in right kidney. Moderate aortic calcification without   aneurysm or dissection. No small bowel abnormality, lymphadenopathy or free   fluid       Borderline size prostate. Normal bladder. Moderate distal diverticulosis without   wall thickening or fat stranding. Normal appendix. No abdominal wall hernia. Moderate degenerative changes in the spine           06/18/21 1458  CTA CHEST W OR W WO CONT Final result    Impression:  Severe emphysema. No active findings       Narrative:  Contrast study was cc [id number] CT dose reduction was achieved through use of a   standardized protocol tailored for this examination and automatic exposure   control for dose modulation. Severe diffuse emphysema. Mild subpleural fibrosis, patchy in distribution. Lungs otherwise clear and central airways are open. Pulmonary arteries are fairly well-opacified and show no filling defect or   distortion. Aorta shows normal dimensions, without dissection. No mediastinal or   hilar adenopathy. Mild coronary calcification. No cardiac finding. No pleural or   pericardial effusion.  Multiple ununited rib fractures bilaterally                 PFTs:   PFT Results  (Last 3 results in the past 10 years)    None            Assessment:     Patient is a 28-year-old  male with  history of alcohol abuse, nicotine dependence, emphysema, and likely COPD who presented to the hospital with worsening shortness of breath over the past several days and was found to be hypoxic. Plan:     1.)  Acute exacerbation of COPD  -Likely secondary to continued smoking and not being on an adequate COPD regimen as an outpatient.  -Agree with every 4 hours scheduled duo nebs, will increase his systemic steroids to Solu-Medrol 40 mg IV every 8 hours given continued significant expiratory wheezing, and will add doxycycline 100 mg p.o. every 12 hours for 5 days given prolonged QTC.  -Still requiring supplemental oxygen, hopefully in the next 24 to 48 hours he will be able to get off of this with ambulation.  -Needs to stop smoking cigarettes. -Definitely needs an outpatient pulmonary follow-up with PFTs, we will help set this up for him prior to discharge. He has been given our office card today. 2.)  Acute hypoxic respiratory failure  -Secondary to COPD exacerbation  -Currently on 2 L nasal cannula, however he was saturating 94% on room air at rest today, on ambulation he desaturated to 84% which improved to 93% on 3 L nasal cannula with ambulation.  -Recommend performing another walking O2 test tomorrow.  -Likely okay for discharge in the next 24 to 48 hours, once hopefully not requiring supplemental oxygen with exertion or longer    3.)  Hyponatremia  -Sodium 119 on admission, suspect due to dehydration as his sodium has improved to 128 on normal saline at 75 cc/h. -Repeat BMP in the morning.    4.)  Nicotine dependence  -Still smoking 1 pack of cigarettes per day, long discussion in the hospital today regarding the health benefits of nicotine abstinence.  -Can have further discussions with him as an outpatient.  -Currently on a NicoDerm patch.         CODE STATUS: Full Code      Disposition and Family: Likely home in 24 to 50 hours    Total time spent with patient: 39 minutes      Palomo Chao DO  [facility]  6/19/2021  3:49 PM

## 2021-06-19 NOTE — PROGRESS NOTES
SpO2 on room air, at rest=94%. SpO2 on room air , while ambulating=84%. SpO2 on 3L, while ambulating=93%.

## 2021-06-19 NOTE — PROGRESS NOTES
Hospitalist Progress Note         YINA Bianchi, FNP-C    Daily Progress Note: 6/19/2021    HPI: Tori Koyanagi is a 77 y.o.  male with a past medical history significant for severe emphysema/COPD, asthma, everyday smoker, and alcohol abuse who presented to the ED with a primary complaint of progressively worsening shortness of breath. Patient states shortness of breath has been intermittent. He reports associated wheezing. He used home inhalers without relief. Does not have nebulizer machine at home. Patient currently smokes 1 pack of cigarettes a day. He used to be a 2-3 pack smoker in the past.  He does not have a PCP or pulmonary provider. He denies any fever, chills, chest pain, cough, abdominal pain, nausea, vomiting, or extremity edema. In the ED, he was noted to be tachypneic and wheezing. Given nebulizer treatment with improvement. Significant labs showing sodium 119. EKG with normal sinus rhythm. CTA of chest showing severe emphysema. Patient started on scheduled duonebs and systemic steroids. Admitted to hospitalist group for COPD exacerbation. Will give bolus of IVFs and start on continuous fluid replacement. Pulmonary consult. Subjective:   Subjective   Patient seen on f/u alert and oriented sitting in chair, tripoding  Patient reports continued sob, worsen with ambulation  No acute distress noted on examination    Review of Systems:   Review of Systems   Constitutional: Negative. HENT: Negative. Eyes: Negative. Respiratory: Positive for cough and shortness of breath. Cardiovascular: Negative. Gastrointestinal: Negative. Genitourinary: Negative. Musculoskeletal: Negative. Skin: Negative. Neurological: Negative. Endo/Heme/Allergies: Negative. Psychiatric/Behavioral: Negative.           Objective:   Objective      Vitals:  Patient Vitals for the past 12 hrs:   Temp Pulse Resp BP SpO2   06/19/21 0913     95 %   06/19/21 0733 97.4 °F (36.3 °C) 95 20 139/83 94 %   06/19/21 0300 98.5 °F (36.9 °C) 84 20 (!) 138/94 97 %   06/19/21 0050 98.1 °F (36.7 °C) 92 20 (!) 146/82 96 %   06/18/21 2349 98.6 °F (37 °C) 79 20 (!) 127/102 100 %        Physical Exam:  Physical Exam  Vitals and nursing note reviewed. Constitutional:       Appearance: Normal appearance. HENT:      Head: Normocephalic. Nose: Nose normal.      Mouth/Throat:      Mouth: Mucous membranes are moist.   Eyes:      Extraocular Movements: Extraocular movements intact. Cardiovascular:      Rate and Rhythm: Normal rate and regular rhythm. Pulses: Normal pulses. Heart sounds: Normal heart sounds. Pulmonary:      Breath sounds: Rhonchi present. Comments: 2L NC, scattered rhonchi  Abdominal:      General: Bowel sounds are normal.      Palpations: Abdomen is soft. Musculoskeletal:         General: Normal range of motion. Cervical back: Normal range of motion. Skin:     General: Skin is warm and dry. Capillary Refill: Capillary refill takes less than 2 seconds. Neurological:      Mental Status: He is alert and oriented to person, place, and time. Psychiatric:         Mood and Affect: Mood normal.         Behavior: Behavior normal.         Thought Content:  Thought content normal.         Judgment: Judgment normal.          Lab Results:  Recent Results (from the past 24 hour(s))   EKG, 12 LEAD, INITIAL    Collection Time: 06/18/21  1:06 PM   Result Value Ref Range    Ventricular Rate 84 BPM    Atrial Rate 84 BPM    P-R Interval 170 ms    QRS Duration 90 ms    Q-T Interval 504 ms    QTC Calculation (Bezet) 595 ms    Calculated P Axis 79 degrees    Calculated R Axis 44 degrees    Calculated T Axis 66 degrees    Diagnosis       Normal sinus rhythm  Nonspecific ST abnormality  Prolonged QT  Abnormal ECG  No previous ECGs available  Confirmed by Douglas Anaya ([id number]) on 6/18/2021 1:32:47 PM     CBC WITH AUTOMATED DIFF    Collection Time: 06/18/21  1:15 PM Result Value Ref Range    WBC 9.9 4.1 - 11.1 K/uL    RBC 4.64 4.10 - 5.70 M/uL    HGB 15.0 12.1 - 17.0 g/dL    HCT 40.1 36.6 - 50.3 %    MCV 86.4 80.0 - 99.0 FL    MCH 32.3 26.0 - 34.0 PG    MCHC 37.4 (H) 30.0 - 36.5 g/dL    RDW 11.5 11.5 - 14.5 %    PLATELET 860 520 - 118 K/uL    MPV 9.1 8.9 - 12.9 FL    NRBC 0.0 0.0  WBC    ABSOLUTE NRBC 0.00 0.00 - 0.01 K/uL    NEUTROPHILS 78 (H) 32 - 75 %    LYMPHOCYTES 13 12 - 49 %    MONOCYTES 8 5 - 13 %    EOSINOPHILS 0 0 - 7 %    BASOPHILS 0 0 - 1 %    IMMATURE GRANULOCYTES 1 (H) 0 - 0.5 %    ABS. NEUTROPHILS 7.8 1.8 - 8.0 K/UL    ABS. LYMPHOCYTES 1.3 0.8 - 3.5 K/UL    ABS. MONOCYTES 0.8 0.0 - 1.0 K/UL    ABS. EOSINOPHILS 0.0 0.0 - 0.4 K/UL    ABS. BASOPHILS 0.0 0.0 - 0.1 K/UL    ABS. IMM. GRANS. 0.1 (H) 0.00 - 0.04 K/UL    DF AUTOMATED     METABOLIC PANEL, COMPREHENSIVE    Collection Time: 06/18/21  1:15 PM   Result Value Ref Range    Sodium 119 (LL) 136 - 145 mmol/L    Potassium 4.1 3.5 - 5.1 mmol/L    Chloride 89 (L) 97 - 108 mmol/L    CO2 19 (L) 21 - 32 mmol/L    Anion gap 11 5 - 15 mmol/L    Glucose 100 65 - 100 mg/dL    BUN 7 6 - 20 mg/dL    Creatinine 0.82 0.70 - 1.30 mg/dL    BUN/Creatinine ratio 9 (L) 12 - 20      GFR est AA >60 >60 ml/min/1.73m2    GFR est non-AA >60 >60 ml/min/1.73m2    Calcium 8.7 8.5 - 10.1 mg/dL    Bilirubin, total 0.9 0.2 - 1.0 mg/dL    AST (SGOT) 152 (H) 15 - 37 U/L    ALT (SGPT) 68 12 - 78 U/L    Alk.  phosphatase 45 45 - 117 U/L    Protein, total 7.8 6.4 - 8.2 g/dL    Albumin 4.6 3.5 - 5.0 g/dL    Globulin 3.2 2.0 - 4.0 g/dL    A-G Ratio 1.4 1.1 - 2.2     TROPONIN I    Collection Time: 06/18/21  1:15 PM   Result Value Ref Range    Troponin-I, Qt. <0.05 <0.05 ng/mL   BNP    Collection Time: 06/18/21  1:15 PM   Result Value Ref Range    NT pro-BNP 88 <125 pg/mL   LIPASE    Collection Time: 06/18/21  1:15 PM   Result Value Ref Range    Lipase 77 73 - 393 U/L   VENOUS BLOOD GAS    Collection Time: 06/18/21  1:15 PM   Result Value Ref Range    VENOUS PH 7.38 7.31 - 7.41      VENOUS PCO2 35.5 (L) 40 - 50 mmHg    VENOUS PO2 53 (H) 36 - 42 mmHg    VENOUS O2 SATURATION 89 (H) 60 - 80 %    VENOUS BICARBONATE 21 (L) 22 - 26 mmol/L    VENOUS BASE DEFICIT 3.6 (H) 0 - 2 mmol/L    O2 METHOD Room air      FIO2 21.0 %    SITE Right Brachial     ETHYL ALCOHOL    Collection Time: 06/18/21  1:15 PM   Result Value Ref Range    ALCOHOL(ETHYL),SERUM <4 <10 mg/dL   TSH 3RD GENERATION    Collection Time: 06/18/21  5:15 PM   Result Value Ref Range    TSH 1.20 0.36 - 4.84 uIU/mL   METABOLIC PANEL, BASIC    Collection Time: 06/19/21  2:29 AM   Result Value Ref Range    Sodium 128 (L) 136 - 145 mmol/L    Potassium 4.5 3.5 - 5.1 mmol/L    Chloride 95 (L) 97 - 108 mmol/L    CO2 21 21 - 32 mmol/L    Anion gap 12 5 - 15 mmol/L    Glucose 102 (H) 65 - 100 mg/dL    BUN 8 6 - 20 mg/dL    Creatinine 0.87 0.70 - 1.30 mg/dL    BUN/Creatinine ratio 9 (L) 12 - 20      GFR est AA >60 >60 ml/min/1.73m2    GFR est non-AA >60 >60 ml/min/1.73m2    Calcium 9.2 8.5 - 10.1 mg/dL   CBC W/O DIFF    Collection Time: 06/19/21  2:29 AM   Result Value Ref Range    WBC 5.9 4.1 - 11.1 K/uL    RBC 4.79 4.10 - 5.70 M/uL    HGB 15.1 12.1 - 17.0 g/dL    HCT 42.1 36.6 - 50.3 %    MCV 87.9 80.0 - 99.0 FL    MCH 31.5 26.0 - 34.0 PG    MCHC 35.9 30.0 - 36.5 g/dL    RDW 12.0 11.5 - 14.5 %    PLATELET 673 683 - 270 K/uL    MPV 9.6 8.9 - 12.9 FL    NRBC 0.0 0.0  WBC    ABSOLUTE NRBC 0.00 0.00 - 0.01 K/uL          Diagnostic Images:  CT Results  (Last 48 hours)               06/18/21 1458  CTA ABDOMEN PELV W CONT Final result    Impression:  Normal exam. No evidence to explain unspecified pain       Narrative:  CT dose reduction was achieved through use of a standardized protocol tailored   for this examination and automatic exposure control for dose modulation. Contrast study shows normal liver, spleen, pancreas, gallbladder, adrenals and   left kidney. Small cyst in right kidney.  Moderate aortic calcification without   aneurysm or dissection. No small bowel abnormality, lymphadenopathy or free   fluid       Borderline size prostate. Normal bladder. Moderate distal diverticulosis without   wall thickening or fat stranding. Normal appendix. No abdominal wall hernia. Moderate degenerative changes in the spine           06/18/21 1458  CTA CHEST W OR W WO CONT Final result    Impression:  Severe emphysema. No active findings       Narrative:  Contrast study was cc Isovue-370 CT dose reduction was achieved through use of a   standardized protocol tailored for this examination and automatic exposure   control for dose modulation. Severe diffuse emphysema. Mild subpleural fibrosis, patchy in distribution. Lungs otherwise clear and central airways are open. Pulmonary arteries are fairly well-opacified and show no filling defect or   distortion. Aorta shows normal dimensions, without dissection. No mediastinal or   hilar adenopathy. Mild coronary calcification. No cardiac finding. No pleural or   pericardial effusion.  Multiple ununited rib fractures bilaterally                 Current Medications:    Current Facility-Administered Medications:     0.9% sodium chloride infusion, 75 mL/hr, IntraVENous, CONTINUOUS, Laura Leija PA-C, Last Rate: 75 mL/hr at 06/18/21 1901, 75 mL/hr at 06/18/21 1901    sodium chloride (NS) flush 5-40 mL, 5-40 mL, IntraVENous, Q8H, RAMONA Hicks-C, 10 mL at 06/18/21 2348    sodium chloride (NS) flush 5-40 mL, 5-40 mL, IntraVENous, PRN, Laura Liss, PA-C    acetaminophen (TYLENOL) tablet 650 mg, 650 mg, Oral, Q6H PRN **OR** acetaminophen (TYLENOL) suppository 650 mg, 650 mg, Rectal, Q6H PRN, LauraRAMONA Marie-C    polyethylene glycol (MIRALAX) packet 17 g, 17 g, Oral, DAILY PRN, Laura RAMONA Leija-C    ondansetron (ZOFRAN ODT) tablet 4 mg, 4 mg, Oral, Q8H PRN **OR** ondansetron (ZOFRAN) injection 4 mg, 4 mg, IntraVENous, Q6H PRN, Cosimo Allen, PA-C    enoxaparin (LOVENOX) injection 40 mg, 40 mg, SubCUTAneous, DAILY, Cosimo Allen, PA-C, 40 mg at 06/19/21 4205    predniSONE (DELTASONE) tablet 20 mg, 20 mg, Oral, DAILY WITH BREAKFAST, Cosimo Allen, PA-C, 20 mg at 06/19/21 0959    hydrALAZINE (APRESOLINE) 20 mg/mL injection 20 mg, 20 mg, IntraVENous, Q6H PRN, Cosimo Allen, PA-C    montelukast (SINGULAIR) tablet 10 mg, 10 mg, Oral, QHS, Cosimo Allen, PA-C, 10 mg at 06/18/21 2348    nicotine (NICODERM CQ) 14 mg/24 hr patch 1 Patch, 1 Patch, TransDERmal, DAILY, Cosimo Allen, PA-C, 1 Patch at 06/19/21 0933       ASSESSMENT:    1. COPD exacerbation: CTA of chest showing severe emphysema. Continue scheduled duonebs, albuterol HFA prn, and systemic steroids, supplemental oxygen to maintain saturations >92%. Pulmonary consulted, outpatient PFTs needed. Walk study shows need for home O2 3L NC. Will obtain overnight split study.     2. Hyponatremia/dehydration: Na 128, continue w/ gentle IV rehydration. Continue NS IVFs at 75 mL/hr. Encourage oral intake. Repeat labs in am     3. Hx asthma: Continue montelukast, duonebs, albuterol HFA prn, and systemic steroids, supplemental oxygen to maintain saturations >92%     4. Current smoker: Smoking cessation counseling provided, Nicoderm patch     5. Hx alcohol abuse: Ethyl alcohol level normal, cessation counseling provided    Full Code  Dvt Prophylaxis Lovenox  GI Prophylaxis Pepcid  Home medications reviewed and reconciled  Disposition: 24-48hrs pending overall clinical improvement. Home O2 needed      Above diagnostic results and treatment plan reviewed and discussed with patient in detail at bedside, all questions answered. Care Plan discussed with: Interdisciplinary team    Total time spent with patient: non critical 35 minutes.     Katie Glover NP

## 2021-06-19 NOTE — ED NOTES
TRANSFER - OUT REPORT:    Verbal report given to Anna Rodriguez RN (name) on Darshan James  being transferred to  (unit) for routine progression of care       Report consisted of patients Situation, Background, Assessment and   Recommendations(SBAR). Information from the following report(s) SBAR, ED Summary, Intake/Output, MAR, Recent Results and Cardiac Rhythm NSR was reviewed with the receiving nurse. Lines:   Peripheral IV 06/18/21 Anterior; Left Forearm (Active)        Opportunity for questions and clarification was provided.       Patient transported with:   [employer]

## 2021-06-19 NOTE — PROGRESS NOTES
Problem: Falls - Risk of  Goal: *Absence of Falls  Description: Document Jethro Saab Fall Risk and appropriate interventions in the flowsheet.   Outcome: Progressing Towards Goal  Note: Fall Risk Interventions:

## 2021-06-20 PROBLEM — I50.9 CHF (CONGESTIVE HEART FAILURE) (HCC): Status: ACTIVE | Noted: 2021-06-20

## 2021-06-20 LAB
ANION GAP SERPL CALC-SCNC: 7 MMOL/L (ref 5–15)
BUN SERPL-MCNC: 9 MG/DL (ref 6–20)
BUN/CREAT SERPL: 8 (ref 12–20)
CA-I BLD-MCNC: 8.6 MG/DL (ref 8.5–10.1)
CHLORIDE SERPL-SCNC: 102 MMOL/L (ref 97–108)
CO2 SERPL-SCNC: 23 MMOL/L (ref 21–32)
CREAT SERPL-MCNC: 1.08 MG/DL (ref 0.7–1.3)
GLUCOSE SERPL-MCNC: 137 MG/DL (ref 65–100)
POTASSIUM SERPL-SCNC: 3.8 MMOL/L (ref 3.5–5.1)
SODIUM SERPL-SCNC: 132 MMOL/L (ref 136–145)

## 2021-06-20 PROCEDURE — 74011250636 HC RX REV CODE- 250/636: Performed by: STUDENT IN AN ORGANIZED HEALTH CARE EDUCATION/TRAINING PROGRAM

## 2021-06-20 PROCEDURE — 96376 TX/PRO/DX INJ SAME DRUG ADON: CPT

## 2021-06-20 PROCEDURE — 65270000029 HC RM PRIVATE

## 2021-06-20 PROCEDURE — 99218 HC RM OBSERVATION: CPT

## 2021-06-20 PROCEDURE — 36415 COLL VENOUS BLD VENIPUNCTURE: CPT

## 2021-06-20 PROCEDURE — 74011250637 HC RX REV CODE- 250/637: Performed by: STUDENT IN AN ORGANIZED HEALTH CARE EDUCATION/TRAINING PROGRAM

## 2021-06-20 PROCEDURE — 74011000250 HC RX REV CODE- 250: Performed by: NURSE PRACTITIONER

## 2021-06-20 PROCEDURE — 94640 AIRWAY INHALATION TREATMENT: CPT

## 2021-06-20 PROCEDURE — 74011250637 HC RX REV CODE- 250/637: Performed by: INTERNAL MEDICINE

## 2021-06-20 PROCEDURE — 74011000250 HC RX REV CODE- 250: Performed by: INTERNAL MEDICINE

## 2021-06-20 PROCEDURE — 94760 N-INVAS EAR/PLS OXIMETRY 1: CPT

## 2021-06-20 PROCEDURE — 74011250636 HC RX REV CODE- 250/636: Performed by: INTERNAL MEDICINE

## 2021-06-20 PROCEDURE — 96372 THER/PROPH/DIAG INJ SC/IM: CPT

## 2021-06-20 PROCEDURE — 80048 BASIC METABOLIC PNL TOTAL CA: CPT

## 2021-06-20 RX ORDER — IPRATROPIUM BROMIDE AND ALBUTEROL SULFATE 2.5; .5 MG/3ML; MG/3ML
3 SOLUTION RESPIRATORY (INHALATION)
Status: DISCONTINUED | OUTPATIENT
Start: 2021-06-20 | End: 2021-06-21 | Stop reason: HOSPADM

## 2021-06-20 RX ADMIN — GUAIFENESIN 600 MG: 600 TABLET, EXTENDED RELEASE ORAL at 20:22

## 2021-06-20 RX ADMIN — IPRATROPIUM BROMIDE AND ALBUTEROL SULFATE 3 ML: .5; 2.5 SOLUTION RESPIRATORY (INHALATION) at 19:19

## 2021-06-20 RX ADMIN — METHYLPREDNISOLONE SODIUM SUCCINATE 40 MG: 40 INJECTION, POWDER, FOR SOLUTION INTRAMUSCULAR; INTRAVENOUS at 20:22

## 2021-06-20 RX ADMIN — IPRATROPIUM BROMIDE AND ALBUTEROL SULFATE 3 ML: .5; 3 SOLUTION RESPIRATORY (INHALATION) at 07:18

## 2021-06-20 RX ADMIN — IPRATROPIUM BROMIDE AND ALBUTEROL SULFATE 3 ML: .5; 3 SOLUTION RESPIRATORY (INHALATION) at 03:59

## 2021-06-20 RX ADMIN — MELATONIN TAB 3 MG 3 MG: 3 TAB at 20:22

## 2021-06-20 RX ADMIN — IPRATROPIUM BROMIDE AND ALBUTEROL SULFATE 3 ML: .5; 3 SOLUTION RESPIRATORY (INHALATION) at 12:59

## 2021-06-20 RX ADMIN — Medication 10 ML: at 14:40

## 2021-06-20 RX ADMIN — ENOXAPARIN SODIUM 40 MG: 40 INJECTION SUBCUTANEOUS at 08:40

## 2021-06-20 RX ADMIN — DOXYCYCLINE HYCLATE 100 MG: 100 CAPSULE ORAL at 20:22

## 2021-06-20 RX ADMIN — METHYLPREDNISOLONE SODIUM SUCCINATE 40 MG: 40 INJECTION, POWDER, FOR SOLUTION INTRAMUSCULAR; INTRAVENOUS at 04:11

## 2021-06-20 RX ADMIN — METHYLPREDNISOLONE SODIUM SUCCINATE 40 MG: 40 INJECTION, POWDER, FOR SOLUTION INTRAMUSCULAR; INTRAVENOUS at 14:36

## 2021-06-20 RX ADMIN — GUAIFENESIN 600 MG: 600 TABLET, EXTENDED RELEASE ORAL at 08:39

## 2021-06-20 RX ADMIN — IPRATROPIUM BROMIDE AND ALBUTEROL SULFATE 3 ML: .5; 2.5 SOLUTION RESPIRATORY (INHALATION) at 17:28

## 2021-06-20 RX ADMIN — MONTELUKAST 10 MG: 10 TABLET, FILM COATED ORAL at 20:22

## 2021-06-20 RX ADMIN — DOXYCYCLINE HYCLATE 100 MG: 100 CAPSULE ORAL at 08:42

## 2021-06-20 RX ADMIN — FAMOTIDINE 20 MG: 20 TABLET ORAL at 08:39

## 2021-06-20 RX ADMIN — FAMOTIDINE 20 MG: 20 TABLET ORAL at 20:22

## 2021-06-20 RX ADMIN — THIAMINE HCL TAB 100 MG 100 MG: 100 TAB at 08:39

## 2021-06-20 RX ADMIN — FOLIC ACID 1 MG: 1 TABLET ORAL at 08:39

## 2021-06-20 NOTE — PROGRESS NOTES
Pulmonology and Critical Care Progress Note    Subjective:     Chief Complaint:   Chief Complaint   Patient presents with    Abdominal Pain    Shortness of Breath      Patient seen and examined in his room this afternoon on the floor, no acute events overnight. Sodium continues to improve to 132 from 128 yesterday on 75 cc/h normal saline. Continue every 6 hour duo nebs, oral doxycycline x5 days, and Solu-Medrol 40 mg IV every 8 hours. He is definitely improving from a respiratory standpoint, saturating well on room air at rest.  Wheezing has improved. Recommend transition to prednisone 40 mg daily tomorrow. Likely can discharge home tomorrow if no longer requiring supplemental oxygen with exertion. Needs follow-up in our clinic.       Current Facility-Administered Medications   Medication Dose Route Frequency Provider Last Rate Last Admin    albuterol-ipratropium (DUO-NEB) 2.5 MG-0.5 MG/3 ML  3 mL Nebulization Q4H PRN Brianna Thacker NP        albuterol-ipratropium (DUO-NEB) 2.5 MG-0.5 MG/3 ML  3 mL Nebulization Q6H RT Jluis Jackson DO        albuterol (PROVENTIL HFA, VENTOLIN HFA, PROAIR HFA) inhaler 2 Puff  2 Puff Inhalation Q4H PRN Zaid Chaparro PA-C   2 Puff at 06/19/21 1042    famotidine (PEPCID) tablet 20 mg  20 mg Oral BID Zaid Chaparro, PA-C   20 mg at 98/37/16 8741    folic acid (FOLVITE) tablet 1 mg  1 mg Oral DAILY Zaidjerome Chaparro, PA-C   1 mg at 06/20/21 4658    guaiFENesin ER (MUCINEX) tablet 600 mg  600 mg Oral Q12H Zaid Baljoseph, PA-C   600 mg at 06/20/21 3029    melatonin tablet 3 mg  3 mg Oral QHS Zaidjerome Chaparro, PA-C        thiamine mononitrate (B-1) tablet 100 mg  100 mg Oral DAILY Zaid Baljoseph, PA-C   100 mg at 06/20/21 0839    methylPREDNISolone (PF) (SOLU-MEDROL) injection 40 mg  40 mg IntraVENous Q8H Jluis Jackson DO   40 mg at 06/20/21 1436    doxycycline (VIBRAMYCIN) capsule 100 mg  100 mg Oral Q12H Murney, Jluis, DO   100 mg at 06/20/21 4276  0.9% sodium chloride infusion  75 mL/hr IntraVENous CONTINUOUS Rollene Iggy, NP 75 mL/hr at 21 1858 75 mL/hr at 21 1858    sodium chloride (NS) flush 5-40 mL  5-40 mL IntraVENous Q8H Leonardo Richard PA-C   10 mL at 21 1440    sodium chloride (NS) flush 5-40 mL  5-40 mL IntraVENous PRN RAMONA Muñiz-C        acetaminophen (TYLENOL) tablet 650 mg  650 mg Oral Q6H PRN Leonardo Richard PA-C        Or    acetaminophen (TYLENOL) suppository 650 mg  650 mg Rectal Q6H PRN Leonardo Richard, PA-C        polyethylene glycol (MIRALAX) packet 17 g  17 g Oral DAILY PRN RAMONA Muñiz-C        ondansetron (ZOFRAN ODT) tablet 4 mg  4 mg Oral Q8H PRN RAMONA Muñiz-C        Or    ondansetron [facility]) injection 4 mg  4 mg IntraVENous Q6H PRN Leonardo Richard PA-C        enoxaparin (LOVENOX) injection 40 mg  40 mg SubCUTAneous DAILY RAMONA Muñiz-C   40 mg at 21 0840    hydrALAZINE (APRESOLINE) 20 mg/mL injection 20 mg  20 mg IntraVENous Q6H PRN RAMONA Muñiz-C        montelukast (SINGULAIR) tablet 10 mg  10 mg Oral QHS RAMONA Muñiz-C   10 mg at 21    nicotine (NICODERM CQ) 14 mg/24 hr patch 1 Patch  1 Patch TransDERmal DAILY GAMALIEL MuñizC   1 Patch at 21 4563          No Known Allergies    Review of Systems:  A comprehensive review of systems was negative except for that written in the HPI. Objective:     Blood pressure 115/73, pulse 81, temperature 97.6 °F (36.4 °C), resp. rate 18, height 5' 7\" (1.702 m), weight 68 kg (150 lb), SpO2 94 %. Temp (24hrs), Av.1 °F (36.7 °C), Min:97.5 °F (36.4 °C), Max:99.2 °F (37.3 °C)      Intake and Output:  Current Shift: No intake/output data recorded.   Last 3 Shifts: 1901 -  0700  In: 1000 [I.V.:1000]  Out: -     Physical Exam:   General appearance: alert, cooperative, no distress, appears stated age  Head: Normocephalic, without obvious abnormality, atraumatic  Eyes: negative  Neck: supple, symmetrical, trachea midline, no adenopathy, no carotid bruit and no JVD  Lungs: Significant expiratory wheezing bilaterally, no rales/rhonchi, no accessory muscle use, currently on room air at rest  Heart: regular rate and rhythm, S1, S2 normal, no murmur, click, rub or gallop  Abdomen: soft, non-tender. Bowel sounds normal. No masses,  no organomegaly  Extremities: extremities normal, atraumatic, no cyanosis or edema  Pulses: 2+ and symmetric  Skin: Skin color, texture, turgor normal. No rashes or lesions  Lymph nodes: Cervical, supraclavicular, and axillary nodes normal.  Neurologic: Grossly normal, alert and oriented x3    Additional comments:None    Lab/Data Review: All lab results for the last 24 hours reviewed. Recent Results (from the past 24 hour(s))   METABOLIC PANEL, BASIC    Collection Time: 06/20/21  7:40 AM   Result Value Ref Range    Sodium 132 (L) 136 - 145 mmol/L    Potassium 3.8 3.5 - 5.1 mmol/L    Chloride 102 97 - 108 mmol/L    CO2 23 21 - 32 mmol/L    Anion gap 7 5 - 15 mmol/L    Glucose 137 (H) 65 - 100 mg/dL    BUN 9 6 - 20 mg/dL    Creatinine 1.08 0.70 - 1.30 mg/dL    BUN/Creatinine ratio 8 (L) 12 - 20      GFR est AA >60 >60 ml/min/1.73m2    GFR est non-AA >60 >60 ml/min/1.73m2    Calcium 8.6 8.5 - 10.1 mg/dL         Chest X-Ray:   CTA ABDOMEN PELV W CONT   Final Result   Normal exam. No evidence to explain unspecified pain      CTA CHEST W OR W WO CONT   Final Result   Severe emphysema. No active findings      XR CHEST PORT   Final Result   The cardiomediastinal silhouette is appropriate for age, technique,   and lung expansion. Pulmonary vasculature is not congested. The lungs are   essentially clear. No effusion or pneumothorax is seen.              CT imaging:  CT Results  (Last 48 hours)    None          PFTs:   PFT Results  (Last 3 results in the past 10 years)    None            Assessment:     Patient is a 53-year-old  male with  history of alcohol abuse, nicotine dependence, emphysema, and likely COPD who presented to the hospital with worsening shortness of breath over the past several days and was found to be hypoxic. Plan:     1.)  Acute exacerbation of COPD  -Likely secondary to continued smoking and not being on an adequate COPD regimen as an outpatient.  -Agree with every 6 hours scheduled duo nebs,  Solu-Medrol 40 mg IV every 8 hours, and doxycycline 100 mg p.o. every 12 hours for 5 total days given prolonged QTC. -Wheezing improving, would recommend transition to prednisone 40 mg daily starting tomorrow.  -Saturating well on room air at rest, recommend walking O2 test tomorrow. If not requiring supplemental oxygen with exertion, patient is likely okay for discharge home.  -Needs to stop smoking cigarettes. -Definitely needs an outpatient pulmonary follow-up with PFTs, we will help set this up for him prior to discharge. He has been given our office card. 2.)  Acute hypoxic respiratory failure  -Secondary to COPD exacerbation  -Currently saturating well on room air at rest  -Recommend performing another walking O2 test tomorrow.  -Likely okay for discharge within the next 24 hours    3.)  Hyponatremia  -Sodium 119 on admission, suspect due to dehydration as his sodium has improved to 132 on normal saline at 75 cc/h. -Repeat BMP in the morning.    4.)  Nicotine dependence  -Still smoking 1 pack of cigarettes per day, long discussion in the hospital today regarding the health benefits of nicotine abstinence.  -Can have further discussions with him as an outpatient.  -Currently on a NicoDerm patch.         CODE STATUS: Full Code      Disposition and Family: Likely home in 24 hours    Total time spent with patient: 28 minutes      Brenda Lawrence DO  [facility]  6/20/2021  3:49 PM

## 2021-06-20 NOTE — RT PROTOCOL NOTE
RT Nebulizer Bronchodilator Protocol Note There is a bronchodilator order in the chart from a provider indicating to follow the RT Bronchodilator Protocol and there is an Initiate RT Bronchodilator Protocol order as well (see protocol at bottom of note). The findings from the last RT Protocol Assessment were as follows: 
Smoking: <15 Pack years Surgical Status: No surgery Xray: Clear Respiratory Pattern: RR 12-20 Mental Status: Alert and oriented Breath Sounds: Clear Cough: Strong, spontaneous, non-productive Activity Level: Walking unassisted Oxygen Requirement: Room Air - 2LNC/28% or home setting Indication for Bronchodilator Therapy: Wheezing associated with pulmonary disorder Bronchodilator Assessment Score: 1 Aerosolized bronchodilator medication orders have been revised according to the RT Bronchodilator Protocol. RT Bronchodilator Protocol: 
 
Respiratory Therapist to perform RT Therapy Protocol Assessment then follow the protocol. No Indications  adjust the frequency to every 6 hours PRN wheezing or bronchospasm, if no treatments needed after 48 hours then discontinue using Per Protocol order mode. If indication present, adjust the RT bronchodilator orders based on the Bronchodilator Assessment Score as follows: 
 
0-6  enter or revise RT Bronchodilator order to Albuterol Nebulizer order with frequency of every 2 hours PRN for wheezing or increased work of breathing using Per Protocol order mode. Repeat RT Therapy Protocol Assessment as needed. 7-10 - discontinue any other Inpatient aerosolized bronchodilator medication orders and enter or revise two Albuterol Nebulizer orders, one with BID frequency and one with frequency of every 2 hours PRN wheezing or increased work of breathing using Per Protocol order mode. Repeat RT Therapy Protocol Assessment with second treatment then BID and as needed.    
 
11-13 - discontinue any other Inpatient aerosolized bronchodilator medication orders and enter [employer] order with QID frequency and an Albuterol Nebulizer order with frequency of every 2 hours PRN wheezing or increased work of breathing using Per Protocol order mode. Repeat RT Therapy Protocol Assessment with second treatment then QID and as needed. Greater than 13 - discontinue any other Inpatient aerosolized bronchodilator medication orders and enter a DuoNeb Nebulizer order with every 4 hours frequency and an Albuterol Nebulizer order with frequency of every 2 hours PRN wheezing or increased work of breathing using Per Protocol order mode. Repeat RT Therapy Protocol Assessment with second treatment then every 4 hours and as needed. RT to enter RT Home Evaluation for COPD & MDI Assessment order using Per Protocol order mode.  
 
Electronically signed by RT Fabricio on 6/20/2021 at 1:52 PM

## 2021-06-20 NOTE — PROGRESS NOTES
Hospitalist Progress Note         YINA Rausch, DARP-C    Daily Progress Note: 6/20/2021    HPI: Radha Najera is a 77 y.o.  male with a past medical history significant for severe emphysema/COPD, asthma, everyday smoker, and alcohol abuse who presented to the ED with a primary complaint of progressively worsening shortness of breath. Patient states shortness of breath has been intermittent. He reports associated wheezing. He used home inhalers without relief. Does not have nebulizer machine at home. Patient currently smokes 1 pack of cigarettes a day. He used to be a 2-3 pack smoker in the past.  He does not have a PCP or pulmonary provider. He denies any fever, chills, chest pain, cough, abdominal pain, nausea, vomiting, or extremity edema. In the ED, he was noted to be tachypneic and wheezing. Given nebulizer treatment with improvement. Significant labs showing sodium 119. EKG with normal sinus rhythm. CTA of chest showing severe emphysema. Patient started on scheduled duonebs and systemic steroids. Admitted to hospitalist group for COPD exacerbation. Will give bolus of IVFs and start on continuous fluid replacement. Pulmonary consult. Subjective:   Subjective   Patient seen on f/u alert and oriented lying in bed  Patient reports continued sob with ambulation  No acute distress noted on examination    Review of Systems:   Review of Systems   Constitutional: Negative. HENT: Negative. Eyes: Negative. Respiratory: Positive for cough and shortness of breath. Cardiovascular: Negative. Gastrointestinal: Negative. Genitourinary: Negative. Musculoskeletal: Negative. Skin: Negative. Neurological: Negative. Endo/Heme/Allergies: Negative. Psychiatric/Behavioral: Negative.           Objective:   Objective      Vitals:  Patient Vitals for the past 12 hrs:   Temp Pulse Resp BP SpO2   06/20/21 0801 97.6 °F (36.4 °C) 81 18 115/73 93 %   06/20/21 0718     95 %   06/19/21 2348     94 %        Physical Exam:  Physical Exam  Vitals and nursing note reviewed. Constitutional:       Appearance: Normal appearance. HENT:      Head: Normocephalic. Nose: Nose normal.      Mouth/Throat:      Mouth: Mucous membranes are moist.   Eyes:      Extraocular Movements: Extraocular movements intact. Cardiovascular:      Rate and Rhythm: Normal rate and regular rhythm. Pulses: Normal pulses. Heart sounds: Normal heart sounds. Pulmonary:      Breath sounds: Rhonchi present. Comments: 2L NC, scattered rhonchi  Abdominal:      General: Bowel sounds are normal.      Palpations: Abdomen is soft. Musculoskeletal:         General: Normal range of motion. Cervical back: Normal range of motion. Skin:     General: Skin is warm and dry. Capillary Refill: Capillary refill takes less than 2 seconds. Neurological:      Mental Status: He is alert and oriented to person, place, and time. Psychiatric:         Mood and Affect: Mood normal.         Behavior: Behavior normal.         Thought Content:  Thought content normal.         Judgment: Judgment normal.          Lab Results:  Recent Results (from the past 24 hour(s))   METABOLIC PANEL, BASIC    Collection Time: 06/20/21  7:40 AM   Result Value Ref Range    Sodium 132 (L) 136 - 145 mmol/L    Potassium 3.8 3.5 - 5.1 mmol/L    Chloride 102 97 - 108 mmol/L    CO2 23 21 - 32 mmol/L    Anion gap 7 5 - 15 mmol/L    Glucose 137 (H) 65 - 100 mg/dL    BUN 9 6 - 20 mg/dL    Creatinine 1.08 0.70 - 1.30 mg/dL    BUN/Creatinine ratio 8 (L) 12 - 20      GFR est AA >60 >60 ml/min/1.73m2    GFR est non-AA >60 >60 ml/min/1.73m2    Calcium 8.6 8.5 - 10.1 mg/dL          Diagnostic Images:  CT Results  (Last 48 hours)               06/18/21 1458  CTA ABDOMEN PELV W CONT Final result    Impression:  Normal exam. No evidence to explain unspecified pain       Narrative:  CT dose reduction was achieved through use of a standardized protocol tailored   for this examination and automatic exposure control for dose modulation. Contrast study shows normal liver, spleen, pancreas, gallbladder, adrenals and   left kidney. Small cyst in right kidney. Moderate aortic calcification without   aneurysm or dissection. No small bowel abnormality, lymphadenopathy or free   fluid       Borderline size prostate. Normal bladder. Moderate distal diverticulosis without   wall thickening or fat stranding. Normal appendix. No abdominal wall hernia. Moderate degenerative changes in the spine           06/18/21 1458  CTA CHEST W OR W WO CONT Final result    Impression:  Severe emphysema. No active findings       Narrative:  Contrast study was cc Isovue-370 CT dose reduction was achieved through use of a   standardized protocol tailored for this examination and automatic exposure   control for dose modulation. Severe diffuse emphysema. Mild subpleural fibrosis, patchy in distribution. Lungs otherwise clear and central airways are open. Pulmonary arteries are fairly well-opacified and show no filling defect or   distortion. Aorta shows normal dimensions, without dissection. No mediastinal or   hilar adenopathy. Mild coronary calcification. No cardiac finding. No pleural or   pericardial effusion.  Multiple ununited rib fractures bilaterally                 Current Medications:    Current Facility-Administered Medications:     albuterol (PROVENTIL HFA, VENTOLIN HFA, PROAIR HFA) inhaler 2 Puff, 2 Puff, Inhalation, Q4H PRN, Roselinda Gave, PA-C, 2 Puff at 06/19/21 1042    famotidine (PEPCID) tablet 20 mg, 20 mg, Oral, BID, Roselinda Gave, PA-C, 20 mg at 54/37/45 5513    folic acid (FOLVITE) tablet 1 mg, 1 mg, Oral, DAILY, Roselinda Gave, PA-C, 1 mg at 06/20/21 0839    guaiFENesin ER (MUCINEX) tablet 600 mg, 600 mg, Oral, Q12H, Roselinda Gave, PA-C, 600 mg at 06/20/21 0839    melatonin tablet 3 mg, 3 mg, Oral, QHS, Daphine Alu, PA-C    thiamine mononitrate (B-1) tablet 100 mg, 100 mg, Oral, DAILY, Daphine Alu, PA-C, 100 mg at 06/20/21 0839    albuterol-ipratropium (DUO-NEB) 2.5 MG-0.5 MG/3 ML, 3 mL, Nebulization, Q4H RT, Chesterton Mikala, NP, 3 mL at 06/20/21 0718    methylPREDNISolone (PF) (SOLU-MEDROL) injection 40 mg, 40 mg, IntraVENous, Q8H, Jluis Jackson, , 40 mg at 06/20/21 0411    doxycycline (VIBRAMYCIN) capsule 100 mg, 100 mg, Oral, Q12H, Manuel, Jluis, DO, 100 mg at 06/20/21 0842    0.9% sodium chloride infusion, 75 mL/hr, IntraVENous, CONTINUOUS, Daphine Alu, PA-C, Last Rate: 75 mL/hr at 06/19/21 1858, 75 mL/hr at 06/19/21 1858    sodium chloride (NS) flush 5-40 mL, 5-40 mL, IntraVENous, Q8H, Reilly Cyr, PA-C, 10 mL at 06/19/21 1421    sodium chloride (NS) flush 5-40 mL, 5-40 mL, IntraVENous, PRN, Daphine Alu, PA-C    acetaminophen (TYLENOL) tablet 650 mg, 650 mg, Oral, Q6H PRN **OR** acetaminophen (TYLENOL) suppository 650 mg, 650 mg, Rectal, Q6H PRN, Daphine Alu, PA-C    polyethylene glycol (MIRALAX) packet 17 g, 17 g, Oral, DAILY PRN, Daphine Alu, PA-C    ondansetron (ZOFRAN ODT) tablet 4 mg, 4 mg, Oral, Q8H PRN **OR** ondansetron (ZOFRAN) injection 4 mg, 4 mg, IntraVENous, Q6H PRN, Daphine Alu, PA-C    enoxaparin (LOVENOX) injection 40 mg, 40 mg, SubCUTAneous, DAILY, Daphine Alu, PA-C, 40 mg at 06/20/21 0840    hydrALAZINE (APRESOLINE) 20 mg/mL injection 20 mg, 20 mg, IntraVENous, Q6H PRN, Daphine Alu, PA-C    montelukast (SINGULAIR) tablet 10 mg, 10 mg, Oral, QHS, Daphine Alu, PA-C, 10 mg at 06/19/21 2001    nicotine (NICODERM CQ) 14 mg/24 hr patch 1 Patch, 1 Patch, TransDERmal, DAILY, Daphine Alu, PA-C, 1 Patch at 06/20/21 8172       ASSESSMENT:    1. COPD exacerbation: secondary to continued CTA of chest showing severe emphysema.  Continue scheduled duonebs q4, albuterol HFA prn, and systemic steroids, supplemental oxygen to maintain saturations >92%. Pulmonary following, outpatient PFTs needed. Walk study shows need for home O2 3L NC. Oral doxycycline 100mg bid x 5 days. Overnight split study ordered. Obtain a repeat walk study     2. Hyponatremia/dehydration: Na 132, continue w/ gentle IV rehydration. Continue NS IVFs at 75 mL/hr. Encourage oral intake. Repeat labs in am     3. Hx asthma: Continue montelukast, duonebs, albuterol HFA prn, and systemic steroids, supplemental oxygen to maintain saturations >92%     4. Current smoker: current 1PPD smoker. Smoking cessation counseling provided, Nicoderm patch     5. Hx alcohol abuse: Ethyl alcohol level normal, cessation counseling provided    Full Code  Dvt Prophylaxis Lovenox  GI Prophylaxis Pepcid  Home medications reviewed and reconciled  Disposition: 24-48hrs pending overall clinical improvement. Home O2 needed      Above diagnostic results and treatment plan reviewed and discussed with patient in detail at bedside, all questions answered. Care Plan discussed with: Interdisciplinary team    Total time spent with patient: non critical 35 minutes.     Veena Dai NP

## 2021-06-21 VITALS
HEIGHT: 67 IN | HEART RATE: 88 BPM | WEIGHT: 150 LBS | RESPIRATION RATE: 20 BRPM | SYSTOLIC BLOOD PRESSURE: 123 MMHG | OXYGEN SATURATION: 98 % | TEMPERATURE: 98.5 F | DIASTOLIC BLOOD PRESSURE: 61 MMHG | BODY MASS INDEX: 23.54 KG/M2

## 2021-06-21 PROCEDURE — 74011250637 HC RX REV CODE- 250/637: Performed by: INTERNAL MEDICINE

## 2021-06-21 PROCEDURE — 74011250636 HC RX REV CODE- 250/636: Performed by: INTERNAL MEDICINE

## 2021-06-21 PROCEDURE — 74011250637 HC RX REV CODE- 250/637: Performed by: STUDENT IN AN ORGANIZED HEALTH CARE EDUCATION/TRAINING PROGRAM

## 2021-06-21 PROCEDURE — 74011250636 HC RX REV CODE- 250/636: Performed by: STUDENT IN AN ORGANIZED HEALTH CARE EDUCATION/TRAINING PROGRAM

## 2021-06-21 PROCEDURE — 94640 AIRWAY INHALATION TREATMENT: CPT

## 2021-06-21 PROCEDURE — 74011000250 HC RX REV CODE- 250: Performed by: INTERNAL MEDICINE

## 2021-06-21 RX ORDER — IBUPROFEN 200 MG
1 TABLET ORAL DAILY
Qty: 30 PATCH | Refills: 0 | Status: SHIPPED | OUTPATIENT
Start: 2021-06-22 | End: 2021-07-22

## 2021-06-21 RX ORDER — DOXYCYCLINE 100 MG/1
100 CAPSULE ORAL EVERY 12 HOURS
Qty: 6 CAPSULE | Refills: 0 | Status: SHIPPED | OUTPATIENT
Start: 2021-06-21 | End: 2021-06-24

## 2021-06-21 RX ORDER — PREDNISONE 10 MG/1
TABLET ORAL
Qty: 22 TABLET | Refills: 0 | Status: SHIPPED | OUTPATIENT
Start: 2021-06-21 | End: 2021-06-27

## 2021-06-21 RX ORDER — MONTELUKAST SODIUM 10 MG/1
10 TABLET ORAL
Qty: 30 TABLET | Refills: 0 | Status: SHIPPED | OUTPATIENT
Start: 2021-06-21 | End: 2021-07-21

## 2021-06-21 RX ORDER — POLYETHYLENE GLYCOL 3350 17 G/17G
17 POWDER, FOR SOLUTION ORAL DAILY
Qty: 30 PACKET | Refills: 0 | Status: SHIPPED | OUTPATIENT
Start: 2021-06-21 | End: 2021-07-21

## 2021-06-21 RX ADMIN — ENOXAPARIN SODIUM 40 MG: 40 INJECTION SUBCUTANEOUS at 08:45

## 2021-06-21 RX ADMIN — THIAMINE HCL TAB 100 MG 100 MG: 100 TAB at 08:45

## 2021-06-21 RX ADMIN — GUAIFENESIN 600 MG: 600 TABLET, EXTENDED RELEASE ORAL at 08:45

## 2021-06-21 RX ADMIN — METHYLPREDNISOLONE SODIUM SUCCINATE 40 MG: 40 INJECTION, POWDER, FOR SOLUTION INTRAMUSCULAR; INTRAVENOUS at 05:19

## 2021-06-21 RX ADMIN — IPRATROPIUM BROMIDE AND ALBUTEROL SULFATE 3 ML: .5; 2.5 SOLUTION RESPIRATORY (INHALATION) at 01:00

## 2021-06-21 RX ADMIN — FOLIC ACID 1 MG: 1 TABLET ORAL at 08:45

## 2021-06-21 RX ADMIN — FAMOTIDINE 20 MG: 20 TABLET ORAL at 08:45

## 2021-06-21 RX ADMIN — DOXYCYCLINE HYCLATE 100 MG: 100 CAPSULE ORAL at 08:45

## 2021-06-21 NOTE — PROGRESS NOTES
Walked pt from his room, down the thompson, to the thompson window 4 times, for approx. 6 mins on RA. Pt was visibly labored, however his lowest point was 92%, average was 95%. Pt. Was educated on proper use of MDI's with teach-back method confirmation.

## 2021-06-21 NOTE — DISCHARGE SUMMARY
Admit date: 6/18/2021   Admitting Provider: Chidi Chou MD    Discharge date: 6/21/2021  Discharging Provider: Da Jacinto NP      * Admission Diagnoses: COPD exacerbation [facility]) [J44.1]  CHF (congestive heart failure) ([facility] [I50.9]    * Discharge Diagnoses:    Hospital Problems as of 6/21/2021 Date Reviewed: 12/3/2018        Codes Class Noted - Resolved POA    CHF (congestive heart failure) ([facility] ICD-10-CM: I50.9  ICD-9-CM: 428.0  6/20/2021 - Present Unknown        * (Principal) COPD exacerbation ([facility] ICD-10-CM: J44.1  ICD-9-CM: 491.21  6/18/2021 - Present Unknown            1. COPD exacerbation: secondary to continued   2. Hyponatremia/dehydration  3. Current smoker    HPI: John Valerio is a 77 y.o.  male with a past medical history significant for severe emphysema/COPD, asthma, everyday smoker, and alcohol abuse who presented to the ED with a primary complaint of progressively worsening shortness of breath. Patient states shortness of breath has been intermittent. He reports associated wheezing. He used home inhalers without relief. Does not have nebulizer machine at home. Patient currently smokes 1 pack of cigarettes a day. He used to be a 2-3 pack smoker in the past.  He does not have a PCP or pulmonary provider. He denies any fever, chills, chest pain, cough, abdominal pain, nausea, vomiting, or extremity edema. In the ED, he was noted to be tachypneic and wheezing. Given nebulizer treatment with improvement. Significant labs showing sodium 119. EKG with normal sinus rhythm. CTA of chest showing severe emphysema. Patient started on scheduled duonebs and systemic steroids. Admitted to hospitalist group for COPD exacerbation. Will give bolus of IVFs and start on continuous fluid replacement. Pulmonary consult. [facility] Course: COPD exacerbation: secondary to continued CTA of chest showing severe emphysema.  Continue scheduled duonebs q4, albuterol HFA prn, and systemic steroids, supplemental oxygen to maintain saturations >92%. Pulmonary following, outpatient PFTs needed. Walk study shows need for home O2 3L NC. Oral doxycycline 100mg bid x 5 days. Overnight split study ordered. Patient no longer requires home O2. Hyponatremia w/ dehydration Na 132,s/p gentle IV rehydration. Encourage oral intake. Hx asthma Continue montelukast, duonebs, albuterol HFA prn, and systemic steroids, supplemental oxygen to maintain saturations >92%. Current smoker: current 1PPD smoker. Smoking cessation counseling provided, Nicoderm patch. Hx alcohol abuse. Ethyl alcohol level normal, cessation counseling provided    * Procedures:   * No surgery found *      Consults: Pulmonary/Intensive care    Significant Diagnostic Studies:   No results found for this or any previous visit (from the past 24 hour(s)). Discharge Exam:  Vitals and nursing note reviewed. Constitutional:       Appearance: Normal appearance. HENT:      Head: Normocephalic. Nose: Nose normal.      Mouth/Throat:      Mouth: Mucous membranes are moist.   Eyes:      Extraocular Movements: Extraocular movements intact. Cardiovascular:      Rate and Rhythm: Normal rate and regular rhythm. Pulses: Normal pulses. Heart sounds: Normal heart sounds. Pulmonary:      Breath sounds: Rhonchi present. Comments: RA, Exp wheezes RUL  Abdominal:      General: Bowel sounds are normal.      Palpations: Abdomen is soft. Musculoskeletal:         General: Normal range of motion. Cervical back: Normal range of motion. Skin:     General: Skin is warm and dry. Capillary Refill: Capillary refill takes less than 2 seconds. Neurological:      Mental Status: He is alert and oriented to person, place, and time. Psychiatric:         Mood and Affect: Mood normal.         Behavior: Behavior normal.         Thought Content:  Thought content normal.         Judgment: Judgment normal.     * Discharge Condition: stable  * Disposition: Home    Discharge Medications:  Current Discharge Medication List      START taking these medications    Details   doxycycline (VIBRAMYCIN) 100 mg capsule Take 1 Capsule by mouth every twelve (12) hours for 3 days. Qty: 6 Capsule, Refills: 0  Start date: 6/21/2021, End date: 6/24/2021      montelukast (SINGULAIR) 10 mg tablet Take 1 Tablet by mouth nightly for 30 days. Qty: 30 Tablet, Refills: 0  Start date: 6/21/2021, End date: 7/21/2021      nicotine (NICODERM CQ) 14 mg/24 hr patch 1 Patch by TransDERmal route daily for 30 days. Qty: 30 Patch, Refills: 0  Start date: 6/22/2021, End date: 7/22/2021      polyethylene glycol (MIRALAX) 17 gram packet Take 1 Packet by mouth daily for 30 days. Qty: 30 Packet, Refills: 0  Start date: 6/21/2021, End date: 7/21/2021         CONTINUE these medications which have CHANGED    Details   predniSONE (DELTASONE) 10 mg tablet Take 60 mg by mouth daily for 1 day, THEN 50 mg daily for 1 day, THEN 40 mg daily for 1 day, THEN 30 mg daily for 1 day, THEN 20 mg daily for 2 days. Qty: 22 Tablet, Refills: 0  Start date: 6/21/2021, End date: 6/27/2021         CONTINUE these medications which have NOT CHANGED    Details   acetaminophen (TYLENOL) 325 mg tablet Take 2 Tabs by mouth every four (4) hours as needed. Qty: 40 Tab, Refills: 0      albuterol-ipratropium (DUO-NEB) 2.5 mg-0.5 mg/3 ml nebu 3 mL by Nebulization route every four (4) hours as needed. COPD J44.9  Qty: 100 Nebule, Refills: 1      famotidine (PEPCID) 20 mg tablet Take 1 Tab by mouth two (2) times a day. Qty: 60 Tab, Refills: 1      folic acid (FOLVITE) 1 mg tablet Take 1 Tab by mouth daily. Qty: 30 Tab, Refills: 1      guaiFENesin ER (MUCINEX) 600 mg ER tablet Take 1 Tab by mouth every twelve (12) hours. Qty: 20 Tab, Refills: 0      melatonin 3 mg tablet Take 1 Tab by mouth nightly. Qty: 30 Tab, Refills: 0      Thiamine Mononitrate (B-1) 100 mg tablet Take 1 Tab by mouth daily.   Qty: 30 Tab, Refills: 1 albuterol (PROVENTIL HFA, VENTOLIN HFA, PROAIR HFA) 90 mcg/actuation inhaler Take 2 Puffs by inhalation every four (4) hours as needed for Wheezing. COPD J44.9  Qty: 1 Inhaler, Refills: 1         STOP taking these medications       chlorpheniramine-HYDROcodone (TUSSIONEX) 10-8 mg/5 mL suspension Comments:   Reason for Stopping:               * Follow-up Care/Patient Instructions: Activity: Activity as tolerated  Diet: Cardiac Diet  Wound Care: None needed    Patient to continue all medications as prescribed  Patient to maintain f/u appointments  Patient counseled on smoking cessation  Patient medically stable for discharge      Follow-up Information     Follow up With Specialties Details Why Contact Info    Feng Orellana NP Nurse Practitioner Schedule an appointment as soon as possible for a visit in 1 week Establish PCP care.  [address] [address]  [phone]      Rubin De La Cruz, DO Pulmonary Critical Care In 2 weeks  [address] [facility]  [phone]          Time Spent: 35 minutes    CC: Feng Orellana NP    Signed:  Patrick Rao NP  6/21/2021  9:04 AM

## 2021-06-21 NOTE — PROGRESS NOTES
Discharge plan of care/case management plan validated with provider discharge order. Pt passed walk study that was needed prior to discharge.